# Patient Record
Sex: FEMALE | Race: WHITE | Employment: OTHER | URBAN - METROPOLITAN AREA
[De-identification: names, ages, dates, MRNs, and addresses within clinical notes are randomized per-mention and may not be internally consistent; named-entity substitution may affect disease eponyms.]

---

## 2017-06-13 ENCOUNTER — HOSPITAL ENCOUNTER (OUTPATIENT)
Dept: PHYSICAL THERAPY | Facility: HOSPITAL | Age: 67
Setting detail: THERAPIES SERIES
Discharge: HOME | End: 2017-06-13
Attending: ORTHOPAEDIC SURGERY
Payer: MEDICARE

## 2017-06-13 DIAGNOSIS — M70.62 TROCHANTERIC BURSITIS OF BOTH HIPS: ICD-10-CM

## 2017-06-13 DIAGNOSIS — M70.61 TROCHANTERIC BURSITIS OF BOTH HIPS: ICD-10-CM

## 2017-06-13 DIAGNOSIS — M47.27 LUMBOSACRAL RADICULOPATHY DUE TO DEGENERATIVE JOINT DISEASE OF SPINE: Primary | ICD-10-CM

## 2017-06-13 PROCEDURE — 97140 MANUAL THERAPY 1/> REGIONS: CPT

## 2017-06-13 PROCEDURE — 97110 THERAPEUTIC EXERCISES: CPT

## 2017-06-13 PROCEDURE — 97162 PT EVAL MOD COMPLEX 30 MIN: CPT

## 2017-06-13 NOTE — PROGRESS NOTES
SPINE EVALUATION:   Referring Physician: Dr. Parish Matamoros  Diagnosis: Lumbosacral radiculopathy due to degenerative disease of spine (722.52; M47.27)  Trochanteric bursitis of both hips (726.5, M70.61)     Date of Service: 6/13/2017     PATIENT Antonio Tenorio and rotation ROM, segmental hypomobility of lumbar and thoracic spine, flexibility deficits most pronounced in hip flexors, ITB, and gluteals, postural deficits, and gluteal muscle strength deficits.   Hercules Calender would benefit from skilled Physical Therapy to a (B)  Single leg bridge: L hip drop with R stance leg      Balance: SLS R 20 sec, less steady, drops opposite hip, L 30 sec, somewhat unsteady    Today’s Treatment and Response: central and unilateral L4/5 and L5/S1 PA mobilization, Gr III; rotational gappi x/week or a total of 10 visits over a 90 day period. Treatment will include: Manual Therapy; Therapeutic Exercises; Neuromuscular Re-education; Therapeutic Activity;  Electrical Stim; Mechanical Traction; Pt education; Home exercise program instruction    E

## 2017-06-15 ENCOUNTER — HOSPITAL ENCOUNTER (OUTPATIENT)
Dept: PHYSICAL THERAPY | Facility: HOSPITAL | Age: 67
Setting detail: THERAPIES SERIES
Discharge: HOME | End: 2017-06-15
Attending: ORTHOPAEDIC SURGERY
Payer: MEDICARE

## 2017-06-15 PROCEDURE — 97140 MANUAL THERAPY 1/> REGIONS: CPT

## 2017-06-15 PROCEDURE — 97110 THERAPEUTIC EXERCISES: CPT

## 2017-06-15 NOTE — PROGRESS NOTES
Dx: Lumbosacral radiculopathy due to degenerative disease of spine (722.52; M47.27)  Trochanteric bursitis of both hips (726.5, M70.61)             Authorized # of Visits:  na         Next MD visit: none scheduled  Fall Risk: standard         Precautions: Date:   Tx#: 6/ Date: Tx#: 7/ Date:    Tx#: 8/   NU step L5 4'         Prone CPA mobs Gr III L3/4, L4/5 and L5/S1 and L/R PA mobs Gr III L5/S1         Prone press-ups x 10         (B) quad stretch in prone         Foam rolling (B) ITB, lateral retinaculum

## 2017-06-19 ENCOUNTER — HOSPITAL ENCOUNTER (OUTPATIENT)
Dept: PHYSICAL THERAPY | Facility: HOSPITAL | Age: 67
Setting detail: THERAPIES SERIES
Discharge: HOME | End: 2017-06-19
Attending: ORTHOPAEDIC SURGERY
Payer: MEDICARE

## 2017-06-19 PROCEDURE — 97110 THERAPEUTIC EXERCISES: CPT

## 2017-06-19 PROCEDURE — 97140 MANUAL THERAPY 1/> REGIONS: CPT

## 2017-06-19 NOTE — PROGRESS NOTES
Dx: Lumbosacral radiculopathy due to degenerative disease of spine (722.52; M47.27)  Trochanteric bursitis of both hips (726.5, M70.61)             Authorized # of Visits:  na         Next MD visit: none scheduled  Fall Risk: standard         Precautions: handaliviail (10 visits)  · Pt will be independent and compliant with comprehensive HEP to maintain progress achieved in PT (10 visits)    Plan: lumbar mobilization, hip mobilization, stretching    Date: 6/15/2017  Tx#: 2/10 Date: 6/19/17    Tx#: 3/10 Date:

## 2017-06-21 ENCOUNTER — APPOINTMENT (OUTPATIENT)
Dept: PHYSICAL THERAPY | Facility: HOSPITAL | Age: 67
End: 2017-06-21
Attending: ORTHOPAEDIC SURGERY
Payer: MEDICARE

## 2017-06-22 ENCOUNTER — APPOINTMENT (OUTPATIENT)
Dept: PHYSICAL THERAPY | Facility: HOSPITAL | Age: 67
End: 2017-06-22
Attending: ORTHOPAEDIC SURGERY
Payer: MEDICARE

## 2017-06-26 ENCOUNTER — HOSPITAL ENCOUNTER (OUTPATIENT)
Dept: PHYSICAL THERAPY | Facility: HOSPITAL | Age: 67
Setting detail: THERAPIES SERIES
Discharge: HOME | End: 2017-06-26
Attending: ORTHOPAEDIC SURGERY
Payer: MEDICARE

## 2017-06-26 PROCEDURE — 97110 THERAPEUTIC EXERCISES: CPT

## 2017-06-26 PROCEDURE — 97140 MANUAL THERAPY 1/> REGIONS: CPT

## 2017-06-26 NOTE — PROGRESS NOTES
Dx: Lumbosacral radiculopathy due to degenerative disease of spine (722.52; M47.27)  Trochanteric bursitis of both hips (726.5, M70.61)             Authorized # of Visits:  na         Next MD visit: none scheduled  Fall Risk: standard         Precautions: demonstrate improved flexibility in lower quadrant to be able to get up and initiate walking without pain (10 visits)  · Pt will improve hip ABD and ER strength to 5-/5 to increase ease with standing and walking (10 visits)  · Pt will demonstrate decreased Skilled Services: pt education, manual therapy    Charges: man 1 10', ex 2 30'       Total Timed Treatment: 40 min  Total Treatment Time: 40 min

## 2017-07-06 ENCOUNTER — HOSPITAL ENCOUNTER (OUTPATIENT)
Dept: PHYSICAL THERAPY | Facility: HOSPITAL | Age: 67
Setting detail: THERAPIES SERIES
Discharge: HOME | End: 2017-07-06
Attending: ORTHOPAEDIC SURGERY
Payer: MEDICARE

## 2017-07-06 PROCEDURE — 97140 MANUAL THERAPY 1/> REGIONS: CPT

## 2017-07-06 PROCEDURE — 97110 THERAPEUTIC EXERCISES: CPT

## 2017-07-06 NOTE — PROGRESS NOTES
Dx: Lumbosacral radiculopathy due to degenerative disease of spine (722.52; M47.27)  Trochanteric bursitis of both hips (726.5, M70.61)             Authorized # of Visits:  na         Next MD visit: none scheduled  Fall Risk: standard         Precautions: mobilization, hip mobilization, stretching    Date: 6/15/2017  Tx#: 2/10 Date: 6/19/17    Tx#: 3/10 Date: 6/26/17   Tx#: 4/10       Date: 7/6/17  Tx#: 5/10 Date: Tx#: 6/ Date: Tx#: 7/ Date:    Tx#: 8/   NU step L5 4' 5' 4' 4'      Prone CPA mobs Gr III

## 2017-07-10 ENCOUNTER — HOSPITAL ENCOUNTER (OUTPATIENT)
Dept: PHYSICAL THERAPY | Facility: HOSPITAL | Age: 67
Setting detail: THERAPIES SERIES
Discharge: HOME | End: 2017-07-10
Attending: ORTHOPAEDIC SURGERY
Payer: MEDICARE

## 2017-07-10 PROCEDURE — 97110 THERAPEUTIC EXERCISES: CPT

## 2017-07-10 PROCEDURE — 97140 MANUAL THERAPY 1/> REGIONS: CPT

## 2017-07-10 NOTE — PROGRESS NOTES
Dx: Lumbosacral radiculopathy due to degenerative disease of spine (722.52; M47.27)  Trochanteric bursitis of both hips (726.5, M70.61)             Authorized # of Visits:  na         Next MD visit: none scheduled  Fall Risk: standard         Precautions: Tx#: 3/10 Date: 6/26/17   Tx#: 4/10       Date: 7/6/17  Tx#: 5/10 Date: 7/10/17  Tx#: 6/10 Date: Tx#: 7/ Date:    Tx#: 8/ NU step L5 4' 5' 4' 4' --     Prone CPA mobs Gr III L3/4, L4/5 and L5/S1 and L/R PA mobs Gr III L5/S1 Lateral shift correction, h 40 min  Total Treatment Time: 40 min

## 2017-07-12 ENCOUNTER — APPOINTMENT (OUTPATIENT)
Dept: PHYSICAL THERAPY | Facility: HOSPITAL | Age: 67
End: 2017-07-12
Attending: ORTHOPAEDIC SURGERY
Payer: MEDICARE

## 2017-07-15 ENCOUNTER — OFFICE VISIT (OUTPATIENT)
Dept: FAMILY MEDICINE CLINIC | Facility: CLINIC | Age: 67
End: 2017-07-15

## 2017-07-15 DIAGNOSIS — Z02.9 ADMINISTRATIVE ENCOUNTER: ICD-10-CM

## 2017-07-15 DIAGNOSIS — R51.9 ACUTE NONINTRACTABLE HEADACHE, UNSPECIFIED HEADACHE TYPE: Primary | ICD-10-CM

## 2017-07-15 NOTE — PROGRESS NOTES
79 y/o female presented with a severe headache x 3 days that is unusual for her. Denies any dizziness, visual changes, weakness. States she just wants to lay down. /70.  Given pt's age and new onset severe headache, she was advised to go to the [address] [place] [address]

## 2017-07-17 ENCOUNTER — APPOINTMENT (OUTPATIENT)
Dept: PHYSICAL THERAPY | Facility: HOSPITAL | Age: 67
End: 2017-07-17
Attending: ORTHOPAEDIC SURGERY
Payer: MEDICARE

## 2017-07-20 ENCOUNTER — LAB ENCOUNTER (OUTPATIENT)
Dept: LAB | Age: 67
End: 2017-07-20
Attending: FAMILY MEDICINE
Payer: MEDICARE

## 2017-07-20 ENCOUNTER — OFFICE VISIT (OUTPATIENT)
Dept: FAMILY MEDICINE CLINIC | Facility: CLINIC | Age: 67
End: 2017-07-20

## 2017-07-20 VITALS
HEART RATE: 77 BPM | DIASTOLIC BLOOD PRESSURE: 60 MMHG | RESPIRATION RATE: 20 BRPM | SYSTOLIC BLOOD PRESSURE: 102 MMHG | HEIGHT: 66.5 IN | WEIGHT: 180.5 LBS | TEMPERATURE: 99 F | BODY MASS INDEX: 28.66 KG/M2 | OXYGEN SATURATION: 95 %

## 2017-07-20 DIAGNOSIS — R53.83 FATIGUE, UNSPECIFIED TYPE: ICD-10-CM

## 2017-07-20 DIAGNOSIS — R94.5 ABNORMAL RESULTS OF LIVER FUNCTION STUDIES: ICD-10-CM

## 2017-07-20 DIAGNOSIS — K52.1 DIARRHEA DUE TO DRUG: Primary | ICD-10-CM

## 2017-07-20 DIAGNOSIS — K52.1 DIARRHEA DUE TO DRUG: ICD-10-CM

## 2017-07-20 DIAGNOSIS — R50.81 FEVER IN OTHER DISEASES: ICD-10-CM

## 2017-07-20 DIAGNOSIS — R50.9 FEVER, UNSPECIFIED FEVER CAUSE: ICD-10-CM

## 2017-07-20 DIAGNOSIS — R19.7 DIARRHEA OF PRESUMED INFECTIOUS ORIGIN: ICD-10-CM

## 2017-07-20 DIAGNOSIS — D72.819 LEUKOPENIA, UNSPECIFIED TYPE: ICD-10-CM

## 2017-07-20 DIAGNOSIS — R74.8 LIVER ENZYME ELEVATION: Primary | ICD-10-CM

## 2017-07-20 LAB
ALBUMIN SERPL-MCNC: 3.5 G/DL (ref 3.5–4.8)
ALP LIVER SERPL-CCNC: 75 U/L (ref 55–142)
ALT SERPL-CCNC: 82 U/L (ref 14–54)
AST SERPL-CCNC: 45 U/L (ref 15–41)
BASOPHILS # BLD AUTO: 0.02 X10(3) UL (ref 0–0.1)
BASOPHILS NFR BLD AUTO: 0.5 %
BILIRUB SERPL-MCNC: 0.2 MG/DL (ref 0.1–2)
BUN BLD-MCNC: 6 MG/DL (ref 8–20)
CALCIUM BLD-MCNC: 9.3 MG/DL (ref 8.3–10.3)
CHLORIDE: 102 MMOL/L (ref 101–111)
CO2: 25 MMOL/L (ref 22–32)
CREAT BLD-MCNC: 0.81 MG/DL (ref 0.55–1.02)
EOSINOPHIL # BLD AUTO: 0.04 X10(3) UL (ref 0–0.3)
EOSINOPHIL NFR BLD AUTO: 1.1 %
ERYTHROCYTE [DISTWIDTH] IN BLOOD BY AUTOMATED COUNT: 13.4 % (ref 11.5–16)
GLUCOSE BLD-MCNC: 92 MG/DL (ref 70–99)
HCT VFR BLD AUTO: 42.3 % (ref 34–50)
HGB BLD-MCNC: 13.9 G/DL (ref 12–16)
IMMATURE GRANULOCYTE COUNT: 0.02 X10(3) UL (ref 0–1)
IMMATURE GRANULOCYTE RATIO %: 0.5 %
LYMPHOCYTES # BLD AUTO: 1.88 X10(3) UL (ref 0.9–4)
LYMPHOCYTES NFR BLD AUTO: 51.6 %
M PROTEIN MFR SERPL ELPH: 7.9 G/DL (ref 6.1–8.3)
MCH RBC QN AUTO: 27.7 PG (ref 27–33.2)
MCHC RBC AUTO-ENTMCNC: 32.9 G/DL (ref 31–37)
MCV RBC AUTO: 84.3 FL (ref 81–100)
MONOCYTES # BLD AUTO: 0.32 X10(3) UL (ref 0.1–0.6)
MONOCYTES NFR BLD AUTO: 8.8 %
NEUTROPHIL ABS PRELIM: 1.36 X10 (3) UL (ref 1.3–6.7)
NEUTROPHILS # BLD AUTO: 1.36 X10(3) UL (ref 1.3–6.7)
NEUTROPHILS NFR BLD AUTO: 37.5 %
PLATELET # BLD AUTO: 249 10(3)UL (ref 150–450)
POTASSIUM SERPL-SCNC: 3.6 MMOL/L (ref 3.6–5.1)
RBC # BLD AUTO: 5.02 X10(6)UL (ref 3.8–5.1)
RED CELL DISTRIBUTION WIDTH-SD: 41.4 FL (ref 35.1–46.3)
SODIUM SERPL-SCNC: 137 MMOL/L (ref 136–144)
WBC # BLD AUTO: 3.6 X10(3) UL (ref 4–13)

## 2017-07-20 PROCEDURE — 99213 OFFICE O/P EST LOW 20 MIN: CPT | Performed by: FAMILY MEDICINE

## 2017-07-20 PROCEDURE — 36415 COLL VENOUS BLD VENIPUNCTURE: CPT

## 2017-07-20 PROCEDURE — 82248 BILIRUBIN DIRECT: CPT

## 2017-07-20 PROCEDURE — 85025 COMPLETE CBC W/AUTO DIFF WBC: CPT

## 2017-07-20 PROCEDURE — 80053 COMPREHEN METABOLIC PANEL: CPT

## 2017-07-20 RX ORDER — AMOXICILLIN AND CLAVULANATE POTASSIUM 875; 125 MG/1; MG/1
TABLET, FILM COATED ORAL
COMMUNITY
Start: 2017-07-16 | End: 2018-10-22 | Stop reason: ALTCHOICE

## 2017-07-20 NOTE — PATIENT INSTRUCTIONS
CBC (blood count) and CMP (electrolytes, kidney & liver function) to check on reasons for fatigue & probable viral infection. Stop Augmentin, if diarrhea continues Saturday, let me know & we can do testing for C dif, a toxin-producing bacteria.     Start

## 2017-07-21 ENCOUNTER — TELEPHONE (OUTPATIENT)
Dept: FAMILY MEDICINE CLINIC | Facility: CLINIC | Age: 67
End: 2017-07-21

## 2017-07-21 ENCOUNTER — LAB ENCOUNTER (OUTPATIENT)
Dept: LAB | Age: 67
End: 2017-07-21
Attending: FAMILY MEDICINE
Payer: MEDICARE

## 2017-07-21 DIAGNOSIS — R51.9 ACUTE NONINTRACTABLE HEADACHE, UNSPECIFIED HEADACHE TYPE: ICD-10-CM

## 2017-07-21 DIAGNOSIS — R79.89 LFT ELEVATION: ICD-10-CM

## 2017-07-21 DIAGNOSIS — B34.9 ACUTE VIRAL SYNDROME: ICD-10-CM

## 2017-07-21 DIAGNOSIS — R94.5 ABNORMAL RESULTS OF LIVER FUNCTION STUDIES: ICD-10-CM

## 2017-07-21 DIAGNOSIS — R50.9 FEVER, UNSPECIFIED FEVER CAUSE: ICD-10-CM

## 2017-07-21 DIAGNOSIS — R74.8 LIVER ENZYME ELEVATION: ICD-10-CM
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
## 2017-07-21 DIAGNOSIS — R19.7 DIARRHEA, UNSPECIFIED TYPE: ICD-10-CM

## 2017-07-21 DIAGNOSIS — D72.819 LEUKOPENIA, UNSPECIFIED TYPE: ICD-10-CM

## 2017-07-21 DIAGNOSIS — R19.7 DIARRHEA OF PRESUMED INFECTIOUS ORIGIN: ICD-10-CM

## 2017-07-21 LAB
BILIRUB DIRECT SERPL-MCNC: <0.1 MG/DL (ref 0.1–0.5)
HAV IGM SER QL: NONREACTIVE
HBV CORE IGM SER QL: NONREACTIVE
HBV SURFACE AG SERPL QL IA: NONREACTIVE
HEPATITIS C VIRUS AB INTERPRETATION: NONREACTIVE

## 2017-07-21 PROCEDURE — 86788 WEST NILE VIRUS AB IGM: CPT

## 2017-07-21 PROCEDURE — 86789 WEST NILE VIRUS ANTIBODY: CPT

## 2017-07-21 PROCEDURE — 80074 ACUTE HEPATITIS PANEL: CPT

## 2017-07-21 PROCEDURE — 36415 COLL VENOUS BLD VENIPUNCTURE: CPT

## 2017-07-21 NOTE — PROGRESS NOTES
HPI:    Patient ID: Eber Watson is a 78year old female.     HPI    Review of Systems         Current Outpatient Prescriptions:  Amoxicillin-Pot Clavulanate 875-125 MG Oral Tab  Disp:  Rfl:    Ascorbic Acid (VITAMIN C) 100 MG Oral Tab Take 100 mg by

## 2017-07-21 NOTE — PROGRESS NOTES
Please call tomorrow to see if hepatitis panel can be added on, and to notify pt of test results. She might need to go back to have hepatitis panel ordered.

## 2017-07-21 NOTE — TELEPHONE ENCOUNTER
Called [facility] in the morning to add on Hepatitis panel. Cant order add on don't have correct tube. Patient needs to do     test.  Patient informed she will have done soon. Patient mentioned bug bite that is small and red.  She thinks     maybe W

## 2017-07-22 ENCOUNTER — PATIENT MESSAGE (OUTPATIENT)
Dept: FAMILY MEDICINE CLINIC | Facility: CLINIC | Age: 67
End: 2017-07-22

## 2017-07-24 ENCOUNTER — TELEPHONE (OUTPATIENT)
Dept: FAMILY MEDICINE CLINIC | Facility: CLINIC | Age: 67
End: 2017-07-24

## 2017-07-24 ENCOUNTER — OFFICE VISIT (OUTPATIENT)
Dept: FAMILY MEDICINE CLINIC | Facility: CLINIC | Age: 67
End: 2017-07-24

## 2017-07-24 VITALS
SYSTOLIC BLOOD PRESSURE: 102 MMHG | BODY MASS INDEX: 29.01 KG/M2 | HEIGHT: 66.25 IN | RESPIRATION RATE: 16 BRPM | HEART RATE: 68 BPM | DIASTOLIC BLOOD PRESSURE: 60 MMHG | TEMPERATURE: 99 F | WEIGHT: 180.5 LBS

## 2017-07-24 DIAGNOSIS — R79.89 LFT ELEVATION: ICD-10-CM

## 2017-07-24 DIAGNOSIS — R51.9 ACUTE NONINTRACTABLE HEADACHE, UNSPECIFIED HEADACHE TYPE: ICD-10-CM
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
## 2017-07-24 DIAGNOSIS — R19.7 DIARRHEA, UNSPECIFIED TYPE: ICD-10-CM

## 2017-07-24 DIAGNOSIS — B34.9 ACUTE VIRAL SYNDROME: Primary | ICD-10-CM

## 2017-07-24 DIAGNOSIS — D72.819 LEUKOPENIA, UNSPECIFIED TYPE: ICD-10-CM

## 2017-07-24 PROCEDURE — 99213 OFFICE O/P EST LOW 20 MIN: CPT | Performed by: FAMILY MEDICINE

## 2017-07-24 NOTE — TELEPHONE ENCOUNTER
Called pt. She said she has looked just 5 minutes before calling us and had not seen lissa's return E-mail. Did schedule pt APPT.     Future Appointments  Date Time Provider Jaylon Knutson   7/24/2017 11:30 AM Norm Dupont MD [facility] [address]

## 2017-07-24 NOTE — PROGRESS NOTES
Yeny Wagner IS A 78year old female HERE FOR Patient presents with: Follow - Up: GI sx have not change,cough has resolved. . room 4       History of present illness:     Still has some sinus congestion, less cough.  Increased urination with increase (VITAMIN C) 100 MG Oral Tab Take 100 mg by mouth daily. Disp:  Rfl:    Zinc Sulfate (ZINC 15 OR) Take by mouth. Disp:  Rfl:    Cyanocobalamin (VITAMIN B12 OR) Take 1 tablet by mouth daily. Disp:  Rfl:    Cranberry 1000 MG Oral Cap Take  by mouth.  Disp:  Rf Main Topics   Smoking status: Never Smoker    Smokeless tobacco: Never Used    Alcohol use Yes  0.0 oz/week     Comment: occ    Drug use: No    Sexual activity: Yes     Other Topics Concern    Blood Transfusions No    Caffeine Concern Yes    Comment: 2 cup with back pain and other aches, avoid ibuprofen for about 3-4 days due to continued diarrhea. Imodium you can try first thing in morning, 1 tablet (2 mg) to prevent diarrhea next 2 days.     Gradual diet advance (soft peeled fruits, cooked vegetables and

## 2017-07-24 NOTE — TELEPHONE ENCOUNTER
Diarrhea. Just in the morning. Had additonal labs done last week/Friday. Did send [employer] message to Dr Christianne Ferrer Saturday as she was told to message Dr Christianne Ferrer if she was still experiencing diarrhea.     Concerned she had not heard back from Dr Christianne Ferrer yet

## 2017-07-24 NOTE — PATIENT INSTRUCTIONS
Tylenol is ok with back pain and other aches, avoid ibuprofen for about 3-4 days due to continued diarrhea. Imodium you can try first thing in morning, 1 tablet (2 mg) to prevent diarrhea next 2 days.     Gradual diet advance (soft peeled fruits, cooked

## 2017-07-24 NOTE — TELEPHONE ENCOUNTER
Message received on Saturday. Message back to pt today to see how she is feeling    Several openings, do you wish to see pt today?

## 2017-07-24 NOTE — TELEPHONE ENCOUNTER
From: Marjorie Urias  To: Courtney Liang MD  Sent: 7/22/2017 4:15 PM CDT  Subject: Other    you said to let you know if the diarrhea continued today Saturday and it has and I am still exhausted.   thank you,  Vernell Swan

## 2017-07-24 NOTE — TELEPHONE ENCOUNTER
Duplicate TE -- as one marked Patient E-Mail and one listed as Telephone Encounter. Pt stated she looked just a few mintues before calling us and had not yet received E-mail message from Jose [place]. Appt scheduled or today.     Future Appointments  Date Ti

## 2017-07-26 ENCOUNTER — TELEPHONE (OUTPATIENT)
Dept: FAMILY MEDICINE CLINIC | Facility: CLINIC | Age: 67
End: 2017-07-26

## 2017-07-26 DIAGNOSIS — K52.1 ANTIBIOTIC-ASSOCIATED DIARRHEA: Primary | ICD-10-CM

## 2017-07-26 DIAGNOSIS — T36.95XA ANTIBIOTIC-ASSOCIATED DIARRHEA: Primary | ICD-10-CM

## 2017-07-26 LAB
WEST NILE VIRUS AB, IGG, SER: 0.05 IV
WEST NILE VIRUS AB, IGM, SER: 0.05 IV

## 2017-07-28 ENCOUNTER — TELEPHONE (OUTPATIENT)
Dept: FAMILY MEDICINE CLINIC | Facility: CLINIC | Age: 67
End: 2017-07-28

## 2017-07-28 NOTE — TELEPHONE ENCOUNTER
No new medicine. Patient says she has red areas under both arms that are larger     than  dimes. Under both arms red and  raised. Not pus filled. But hurts  if     pressed. Call back on Monday prn. No new detergent or     underarm shaving.  Patient is LILY

## 2017-07-28 NOTE — TELEPHONE ENCOUNTER
Patient called and is upset due to she is getting pumps under her arms and she does not know what they are.     She would like to speak with a nurse asap

## 2017-08-21 ENCOUNTER — PATIENT MESSAGE (OUTPATIENT)
Dept: FAMILY MEDICINE CLINIC | Facility: CLINIC | Age: 67
End: 2017-08-21

## 2017-08-22 NOTE — TELEPHONE ENCOUNTER
From: Phu Mims  To: Brea Fried MD  Sent: 8/21/2017 3:19 PM CDT  Subject: Visit Follow-up Question    I believe I am suppose to have some follow up blood work.   I am not sure when but I thought by month end.  thanks   [facility]

## 2017-08-24 ENCOUNTER — LAB ENCOUNTER (OUTPATIENT)
Dept: LAB | Age: 67
End: 2017-08-24
Attending: FAMILY MEDICINE
Payer: MEDICARE

## 2017-08-24 DIAGNOSIS — R50.9 FEVER, UNSPECIFIED FEVER CAUSE: ICD-10-CM

## 2017-08-24 DIAGNOSIS — D72.819 LEUKOPENIA, UNSPECIFIED TYPE: ICD-10-CM

## 2017-08-24 DIAGNOSIS — R19.7 DIARRHEA OF PRESUMED INFECTIOUS ORIGIN: ICD-10-CM

## 2017-08-24 DIAGNOSIS — R74.8 LIVER ENZYME ELEVATION: ICD-10-CM

## 2017-08-24 LAB
BASOPHILS # BLD AUTO: 0.04 X10(3) UL (ref 0–0.1)
BASOPHILS NFR BLD AUTO: 0.5 %
EOSINOPHIL # BLD AUTO: 0.12 X10(3) UL (ref 0–0.3)
EOSINOPHIL NFR BLD AUTO: 1.5 %
ERYTHROCYTE [DISTWIDTH] IN BLOOD BY AUTOMATED COUNT: 15.4 % (ref 11.5–16)
HCT VFR BLD AUTO: 41.8 % (ref 34–50)
HGB BLD-MCNC: 13.3 G/DL (ref 12–16)
IMMATURE GRANULOCYTE COUNT: 0.02 X10(3) UL (ref 0–1)
IMMATURE GRANULOCYTE RATIO %: 0.3 %
LYMPHOCYTES # BLD AUTO: 2.28 X10(3) UL (ref 0.9–4)
LYMPHOCYTES NFR BLD AUTO: 28.8 %
MCH RBC QN AUTO: 28.9 PG (ref 27–33.2)
MCHC RBC AUTO-ENTMCNC: 31.8 G/DL (ref 31–37)
MCV RBC AUTO: 90.7 FL (ref 81–100)
MONOCYTES # BLD AUTO: 0.68 X10(3) UL (ref 0.1–0.6)
MONOCYTES NFR BLD AUTO: 8.6 %
NEUTROPHIL ABS PRELIM: 4.78 X10 (3) UL (ref 1.3–6.7)
NEUTROPHILS # BLD AUTO: 4.78 X10(3) UL (ref 1.3–6.7)
NEUTROPHILS NFR BLD AUTO: 60.3 %
PLATELET # BLD AUTO: 251 10(3)UL (ref 150–450)
RBC # BLD AUTO: 4.61 X10(6)UL (ref 3.8–5.1)
RED CELL DISTRIBUTION WIDTH-SD: 50.9 FL (ref 35.1–46.3)
WBC # BLD AUTO: 7.9 X10(3) UL (ref 4–13)

## 2017-08-24 PROCEDURE — 85025 COMPLETE CBC W/AUTO DIFF WBC: CPT

## 2017-08-24 PROCEDURE — 36415 COLL VENOUS BLD VENIPUNCTURE: CPT

## 2017-10-16 ENCOUNTER — HOSPITAL ENCOUNTER (OUTPATIENT)
Dept: MRI IMAGING | Age: 67
Discharge: HOME | End: 2017-10-16
Attending: ORTHOPAEDIC SURGERY
Payer: MEDICARE

## 2017-10-16 DIAGNOSIS — M76.899 ENTHESOPATHY OF HIP REGION: ICD-10-CM

## 2017-10-16 PROCEDURE — 73721 MRI JNT OF LWR EXTRE W/O DYE: CPT | Performed by: ORTHOPAEDIC SURGERY

## 2018-03-23 ENCOUNTER — TELEPHONE (OUTPATIENT)
Dept: FAMILY MEDICINE CLINIC | Facility: CLINIC | Age: 68
End: 2018-03-23

## 2018-03-23 NOTE — TELEPHONE ENCOUNTER
[facility]  Sending in green bag to [employer] [facility] 3/23/2018

## 2018-03-27 ENCOUNTER — TELEPHONE (OUTPATIENT)
Dept: FAMILY MEDICINE CLINIC | Facility: CLINIC | Age: 68
End: 2018-03-27

## 2018-03-27 NOTE — TELEPHONE ENCOUNTER
Received request for Medical Records from [facility].  Sending to MR in green bag 3/27/2018 unknown

## 2018-10-22 ENCOUNTER — OFFICE VISIT (OUTPATIENT)
Dept: FAMILY MEDICINE CLINIC | Facility: CLINIC | Age: 68
End: 2018-10-22
Payer: MEDICARE

## 2018-10-22 VITALS
HEART RATE: 65 BPM | DIASTOLIC BLOOD PRESSURE: 72 MMHG | HEIGHT: 67 IN | WEIGHT: 184.63 LBS | OXYGEN SATURATION: 98 % | SYSTOLIC BLOOD PRESSURE: 114 MMHG | BODY MASS INDEX: 28.98 KG/M2 | TEMPERATURE: 98 F | RESPIRATION RATE: 16 BRPM

## 2018-10-22 DIAGNOSIS — Z51.81 ENCOUNTER FOR MONITORING CHRONIC NSAID THERAPY: ICD-10-CM

## 2018-10-22 DIAGNOSIS — M67.952 TENDINOPATHY OF LEFT GLUTEUS MEDIUS: Primary | ICD-10-CM

## 2018-10-22 DIAGNOSIS — Z12.31 ENCOUNTER FOR SCREENING MAMMOGRAM FOR BREAST CANCER: ICD-10-CM

## 2018-10-22 DIAGNOSIS — Z79.1 ENCOUNTER FOR MONITORING CHRONIC NSAID THERAPY: ICD-10-CM

## 2018-10-22 PROCEDURE — 99213 OFFICE O/P EST LOW 20 MIN: CPT | Performed by: FAMILY MEDICINE

## 2018-10-22 NOTE — PATIENT INSTRUCTIONS
Sees Dr. Garcia with [facility] for advice on what to do to rehab your gluteus medius tear/strain. Continue Aleve 2 pills twice daily     Get lab work for kidney & liver function due to the aleve use.     Probably need some physical therapy bu

## 2018-10-22 NOTE — PROGRESS NOTES
Darwin Hunt IS A 70year old female HERE FOR Patient presents with:  Pain: LT gluteus        History of present illness:     Saw Dr Deneen Walker a year ago for acute pain, went through therapy, does exercises at home.  Had partial tear/fray at inserton of g glasses        No past surgical history on file. Current medications:       Current Outpatient Medications:  Naproxen Sodium (ALEVE OR) Take 2 tablets by mouth as needed.  Disp:  Rfl:    Ascorbic Acid (VITAMIN C) 100 MG Oral Tab Take 100 mg by mouth ramy thyroid/endocrine   • Cancer Brother        Social history:       Social History    Socioeconomic History      Marital status:       Spouse name: Not on file      Number of children: 2      Years of education: Not on file      Highest education leve on right. SLR negative  DTRs normal bilateral, EHL negative     Test results: MRI 10/16/17 Partial tearing/fraying at the insertional fibers of the gluteus medius on the greater trochanter. Assessment & Plan:   Terry Kellogg was seen today for pain.     Diag

## 2018-10-24 ENCOUNTER — HOSPITAL ENCOUNTER (OUTPATIENT)
Dept: MAMMOGRAPHY | Age: 68
Discharge: HOME | End: 2018-10-24
Attending: FAMILY MEDICINE
Payer: MEDICARE

## 2018-10-24 DIAGNOSIS — Z12.31 ENCOUNTER FOR SCREENING MAMMOGRAM FOR BREAST CANCER: ICD-10-CM

## 2018-10-24 PROCEDURE — 77063 BREAST TOMOSYNTHESIS BI: CPT | Performed by: FAMILY MEDICINE

## 2018-10-24 PROCEDURE — 77067 SCR MAMMO BI INCL CAD: CPT | Performed by: FAMILY MEDICINE

## 2018-10-25 ENCOUNTER — MED REC SCAN ONLY (OUTPATIENT)
Dept: FAMILY MEDICINE CLINIC | Facility: CLINIC | Age: 68
End: 2018-10-25

## 2018-11-02 ENCOUNTER — MED REC SCAN ONLY (OUTPATIENT)
Dept: FAMILY MEDICINE CLINIC | Facility: CLINIC | Age: 68
End: 2018-11-02

## 2018-11-19 ENCOUNTER — APPOINTMENT (OUTPATIENT)
Dept: LAB | Age: 68
End: 2018-11-19
Attending: FAMILY MEDICINE
Payer: MEDICARE

## 2018-11-19 DIAGNOSIS — Z51.81 ENCOUNTER FOR MONITORING CHRONIC NSAID THERAPY: ICD-10-CM

## 2018-11-19 DIAGNOSIS — Z79.1 ENCOUNTER FOR MONITORING CHRONIC NSAID THERAPY: ICD-10-CM

## 2018-11-19 PROCEDURE — 80053 COMPREHEN METABOLIC PANEL: CPT

## 2018-11-19 PROCEDURE — 36415 COLL VENOUS BLD VENIPUNCTURE: CPT

## 2019-03-27 ENCOUNTER — OFFICE VISIT (OUTPATIENT)
Dept: FAMILY MEDICINE CLINIC | Facility: CLINIC | Age: 69
End: 2019-03-27
Payer: MEDICARE

## 2019-03-27 VITALS
TEMPERATURE: 99 F | WEIGHT: 179 LBS | SYSTOLIC BLOOD PRESSURE: 106 MMHG | BODY MASS INDEX: 28.09 KG/M2 | HEART RATE: 76 BPM | RESPIRATION RATE: 16 BRPM | DIASTOLIC BLOOD PRESSURE: 70 MMHG | OXYGEN SATURATION: 98 % | HEIGHT: 66.75 IN

## 2019-03-27 DIAGNOSIS — Z00.00 ENCOUNTER FOR ANNUAL HEALTH EXAMINATION: Primary | ICD-10-CM

## 2019-03-27 DIAGNOSIS — Z13.6 SCREENING FOR CARDIOVASCULAR CONDITION: ICD-10-CM

## 2019-03-27 DIAGNOSIS — E04.9 GOITER: ICD-10-CM

## 2019-03-27 DIAGNOSIS — Z80.52 FAMILY HISTORY OF MALIGNANT NEOPLASM OF URINARY BLADDER: ICD-10-CM

## 2019-03-27 DIAGNOSIS — Z78.0 POSTMENOPAUSAL: ICD-10-CM

## 2019-03-27 DIAGNOSIS — E78.00 HYPERCHOLESTEROLEMIA: ICD-10-CM

## 2019-03-27 LAB
APPEARANCE: CLEAR
BILIRUBIN: NEGATIVE
GLUCOSE (URINE DIPSTICK): NEGATIVE MG/DL
KETONES (URINE DIPSTICK): NEGATIVE MG/DL
LEUKOCYTES: NEGATIVE
MULTISTIX LOT#: NORMAL NUMERIC
NITRITE, URINE: NEGATIVE
OCCULT BLOOD: NEGATIVE
PH, URINE: 7 (ref 4.5–8)
PROTEIN (URINE DIPSTICK): NEGATIVE MG/DL
SPECIFIC GRAVITY: 1.01 (ref 1–1.03)
URINE-COLOR: YELLOW
UROBILINOGEN,SEMI-QN: 0.2 MG/DL (ref 0–1.9)

## 2019-03-27 PROCEDURE — 81003 URINALYSIS AUTO W/O SCOPE: CPT | Performed by: FAMILY MEDICINE

## 2019-03-27 PROCEDURE — G0439 PPPS, SUBSEQ VISIT: HCPCS | Performed by: FAMILY MEDICINE

## 2019-03-27 NOTE — PROGRESS NOTES
HPI:   Bennyjt Farfan is a 70year old female who presents for a Medicare Subsequent Annual Wellness visit (Pt already had Initial Annual Wellness). Was out in [place] to see sis in law.      Her last annual assessment has been over 1 year: Annual P unspecified site     Primary localized osteoarthrosis, hand     Lipoma of upper extremity (left hand palm) x years, advised to avoid removal if she can function which she could.       Plantar fasciitis--hurts with fashionable shoes      Palpitations--not a inoculation against influenza,  (normal spontaneous vaginal delivery) (, ), Osteopenia, Pain in joint, multiple sites, Polydipsia, Pure hypercholesterolemia, Routine general medical examination at a health care facility, Routine gynecological e 66. 75\". Weight as of this encounter: 179 lb. Medicare Hearing Assessment  (Required for AWV/SWV)    Questionnaire no problems       Visual Acuity --new glasses.                             TMs and throat normal  Neck no adenopathy, thyroid lumpy and mental well-being?: Visiting Friends;Puzzles;Games; Social Interaction; Visiting Family      This section provided for quick review of chart, separate sheet to patient  [address] Internal Lab or Procedure External Lab or applicable)     Influenza  Covered Annually 10/25/2018 Please get every year    Pneumococcal 13 (Prevnar)  Covered Once after 65 12/02/2016 Please get once after your 65th birthday    Pneumococcal 23 (Pneumovax)  Covered Once after 65 11/24/2014 Please get

## 2019-03-27 NOTE — PATIENT INSTRUCTIONS
[employer] SCREENING SCHEDULE   Tests on this list are recommended by your physician but may not be covered, or covered at this frequency, by your insurer. Please check with your insurance carrier before scheduling to verify coverage.    PREVEN following criteria:   • Men who are 73-68 years old and have smoked more than 100 cigarettes in their lifetime   • Anyone with a family history    Colorectal Cancer Screening  Covered up to Age 76     Colonoscopy Screen   Covered every 10 years- more often placed or performed in visit on 11/24/14   • FLU VACC PRSV FREE INC ANTIG    Please get every year    Pneumococcal 13 (Prevnar)  Covered Once after 65 Orders placed or performed in visit on 12/02/16   • PNEUMOCOCCAL VACC, 13 JOSE IM    Please get once after

## 2019-04-18 ENCOUNTER — TELEPHONE (OUTPATIENT)
Dept: FAMILY MEDICINE CLINIC | Facility: CLINIC | Age: 69
End: 2019-04-18

## 2019-04-18 NOTE — TELEPHONE ENCOUNTER
Patient LVM at 10:59 am stating she has questions regarding her labs. She wants to know about fasting and which labs were ordered. Forwarded VM to triage VM.

## 2019-04-22 ENCOUNTER — APPOINTMENT (OUTPATIENT)
Dept: LAB | Age: 69
End: 2019-04-22
Attending: FAMILY MEDICINE
Payer: MEDICARE

## 2019-04-22 ENCOUNTER — HOSPITAL ENCOUNTER (OUTPATIENT)
Dept: BONE DENSITY | Age: 69
Discharge: HOME | End: 2019-04-22
Attending: FAMILY MEDICINE
Payer: MEDICARE

## 2019-04-22 ENCOUNTER — HOSPITAL ENCOUNTER (OUTPATIENT)
Dept: ULTRASOUND IMAGING | Age: 69
Discharge: HOME | End: 2019-04-22
Attending: FAMILY MEDICINE
Payer: MEDICARE

## 2019-04-22 DIAGNOSIS — E78.00 HYPERCHOLESTEROLEMIA: ICD-10-CM

## 2019-04-22 DIAGNOSIS — E04.9 GOITER: ICD-10-CM

## 2019-04-22 DIAGNOSIS — Z13.6 SCREENING FOR CARDIOVASCULAR CONDITION: ICD-10-CM

## 2019-04-22 DIAGNOSIS — Z78.0 POSTMENOPAUSAL: ICD-10-CM

## 2019-04-22 PROCEDURE — 84443 ASSAY THYROID STIM HORMONE: CPT

## 2019-04-22 PROCEDURE — 76536 US EXAM OF HEAD AND NECK: CPT | Performed by: FAMILY MEDICINE

## 2019-04-22 PROCEDURE — 80053 COMPREHEN METABOLIC PANEL: CPT

## 2019-04-22 PROCEDURE — 36415 COLL VENOUS BLD VENIPUNCTURE: CPT

## 2019-04-22 PROCEDURE — 77080 DXA BONE DENSITY AXIAL: CPT | Performed by: FAMILY MEDICINE

## 2019-04-22 PROCEDURE — 80061 LIPID PANEL: CPT

## 2019-05-22 ENCOUNTER — TELEPHONE (OUTPATIENT)
Dept: FAMILY MEDICINE CLINIC | Facility: CLINIC | Age: 69
End: 2019-05-22

## 2019-05-22 NOTE — TELEPHONE ENCOUNTER
Medical Record Request Received    Date received in office: 5-22-19    Requested from: [facility]    Records to be sent to: [facility]   E-mail address :  [email or web address]            Date request sent to [employer]: 5-23-19

## 2019-05-23 ENCOUNTER — OFFICE VISIT (OUTPATIENT)
Dept: FAMILY MEDICINE CLINIC | Facility: CLINIC | Age: 69
End: 2019-05-23
Payer: MEDICARE

## 2019-05-23 VITALS
BODY MASS INDEX: 28.69 KG/M2 | DIASTOLIC BLOOD PRESSURE: 74 MMHG | RESPIRATION RATE: 16 BRPM | OXYGEN SATURATION: 98 % | HEART RATE: 83 BPM | SYSTOLIC BLOOD PRESSURE: 116 MMHG | WEIGHT: 182.81 LBS | HEIGHT: 66.75 IN | TEMPERATURE: 99 F

## 2019-05-23 DIAGNOSIS — L56.4: Primary | ICD-10-CM

## 2019-05-23 PROCEDURE — 99213 OFFICE O/P EST LOW 20 MIN: CPT | Performed by: FAMILY MEDICINE

## 2019-05-23 RX ORDER — PREDNISONE 20 MG/1
20 TABLET ORAL DAILY
Qty: 7 TABLET | Refills: 0 | Status: SHIPPED | OUTPATIENT
Start: 2019-05-23 | End: 2019-05-30

## 2019-05-23 NOTE — PROGRESS NOTES
Juanpablo Lawton IS A 70year old female HERE FOR Patient presents with:  Rash: Chest area        History of present illness:       Was in [place], got rash 5/7/19 ant chest and up back of neck, felt swollen like allergic reaction, itchy, was given Medrol 80 needed. Disp:  Rfl:    Ascorbic Acid (VITAMIN C) 100 MG Oral Tab Take 100 mg by mouth daily. Disp:  Rfl:    Zinc Sulfate (ZINC 15 OR) Take by mouth. Disp:  Rfl:    Cyanocobalamin (VITAMIN B12 OR) Take 1 tablet by mouth daily.  Disp:  Rfl:    Vitamin B-1 (VI resource strain: Not on file      Food insecurity:        Worry: Not on file        Inability: Not on file      Transportation needs:        Medical: Not on file        Non-medical: Not on file    Tobacco Use      Smoking status: Never Smoker      Smokeles Exam:     /74 (BP Location: Right arm, Patient Position: Sitting, Cuff Size: adult)   Pulse 83   Temp 98.5 °F (36.9 °C) (Oral)   Resp 16   Ht 66.75\"   Wt 182 lb 12.8 oz   SpO2 98%   BMI 28.85 kg/m²      Anterior chest rash moderate erythema conf

## 2019-05-23 NOTE — PATIENT INSTRUCTIONS
Prednisone 20 mg daily for a week and try hydroxyzine 1/2 tablet during daily every 8 hours as needed, can take a whole tab at night.      Consider dermatology referral if not improving or if worse again after this steroid burst.

## 2019-06-04 NOTE — TELEPHONE ENCOUNTER
[facility] called to check status of medical records request.  Let them know that the request was forwarded to [employer] on 5.23.19.

## 2020-02-14 ENCOUNTER — TELEPHONE (OUTPATIENT)
Dept: FAMILY MEDICINE CLINIC | Facility: CLINIC | Age: 70
End: 2020-02-14

## 2020-06-14 ENCOUNTER — PATIENT MESSAGE (OUTPATIENT)
Dept: FAMILY MEDICINE CLINIC | Facility: CLINIC | Age: 70
End: 2020-06-14

## 2020-06-16 NOTE — TELEPHONE ENCOUNTER
From: Sandrea Nageotte  To: David Duong MD  Sent: 6/14/2020 4:42 PM CDT  Subject: Other    I made an appointment for a 3D mammogram do I need doctor's orders?     I have a physical on Thursday 6/18 I just want to make sure that the time alloted is

## 2020-06-18 ENCOUNTER — OFFICE VISIT (OUTPATIENT)
Dept: FAMILY MEDICINE CLINIC | Facility: CLINIC | Age: 70
End: 2020-06-18
Payer: COMMERCIAL

## 2020-06-18 ENCOUNTER — HOSPITAL ENCOUNTER (OUTPATIENT)
Dept: MAMMOGRAPHY | Age: 70
Discharge: HOME | End: 2020-06-18
Attending: FAMILY MEDICINE
Payer: MEDICARE

## 2020-06-18 VITALS
SYSTOLIC BLOOD PRESSURE: 110 MMHG | RESPIRATION RATE: 16 BRPM | WEIGHT: 188 LBS | TEMPERATURE: 98 F | DIASTOLIC BLOOD PRESSURE: 62 MMHG | HEIGHT: 67 IN | HEART RATE: 93 BPM | OXYGEN SATURATION: 97 % | BODY MASS INDEX: 29.51 KG/M2

## 2020-06-18 DIAGNOSIS — R22.32 MASS OF LEFT HAND: ICD-10-CM

## 2020-06-18 DIAGNOSIS — Z00.00 ENCOUNTER FOR ANNUAL HEALTH EXAMINATION: Primary | ICD-10-CM

## 2020-06-18 DIAGNOSIS — M43.16 SPONDYLOLISTHESIS OF LUMBAR REGION: ICD-10-CM

## 2020-06-18 DIAGNOSIS — Z12.31 ENCOUNTER FOR SCREENING MAMMOGRAM FOR BREAST CANCER: ICD-10-CM

## 2020-06-18 DIAGNOSIS — M19.049 PRIMARY LOCALIZED OSTEOARTHROSIS OF HAND, UNSPECIFIED LATERALITY: ICD-10-CM

## 2020-06-18 DIAGNOSIS — M85.80 OSTEOPENIA, UNSPECIFIED LOCATION: ICD-10-CM

## 2020-06-18 DIAGNOSIS — E78.00 PURE HYPERCHOLESTEROLEMIA: ICD-10-CM

## 2020-06-18 DIAGNOSIS — R39.15 URINARY URGENCY: ICD-10-CM

## 2020-06-18 DIAGNOSIS — Z51.81 MEDICATION MONITORING ENCOUNTER: ICD-10-CM

## 2020-06-18 PROCEDURE — 77067 SCR MAMMO BI INCL CAD: CPT | Performed by: FAMILY MEDICINE

## 2020-06-18 PROCEDURE — 77063 BREAST TOMOSYNTHESIS BI: CPT | Performed by: FAMILY MEDICINE

## 2020-06-18 PROCEDURE — 96160 PT-FOCUSED HLTH RISK ASSMT: CPT | Performed by: FAMILY MEDICINE

## 2020-06-18 PROCEDURE — 99397 PER PM REEVAL EST PAT 65+ YR: CPT | Performed by: FAMILY MEDICINE

## 2020-06-18 PROCEDURE — G0439 PPPS, SUBSEQ VISIT: HCPCS | Performed by: FAMILY MEDICINE

## 2020-06-18 RX ORDER — SOLIFENACIN SUCCINATE 5 MG/1
5 TABLET, FILM COATED ORAL DAILY
Qty: 30 TABLET | Refills: 1 | Status: SHIPPED | OUTPATIENT
Start: 2020-06-18 | End: 2021-02-05 | Stop reason: CLARIF

## 2020-06-18 NOTE — PATIENT INSTRUCTIONS
voltaren gel OTC can be helpful for hand joints. If worse bladder problems, can try bladder medicine (generic for Vesicare) 5 mg at bedtime. Would try if 3 times a week or more loss of urine. Try to decrease fluids within 2 hours of bedtime.      Check Medicare, and non-screening if indicated for medical reasons Electrocardiogram date Routine EKG is not a screening covered service except at the [facility]    Abdominal aortic aneurysm screening (once between ages 73-68) IPPE only No results for this patient. Chlamydia  Annually if high risk No results found for: CHLAMYDIA No flowsheet data found.     Screening Mammogram      Mammogram    Recommend Annually to at least age 76, and as needed after 76 Mammogram due on 10/24/2019 Please get th different types of Advance Directives. It also has the State forms available on it's website for anyone to review and print using their home computer and printer. (the forms are also available in [address])  [email or web address]  This link also has informa

## 2020-06-18 NOTE — PROGRESS NOTES
HPI:   Dearjane Jefferson is a 78year old female who presents for a Medicare Subsequent Annual Wellness visit (Pt already had Initial Annual Wellness).     Here for well exam  Her last annual assessment has been over 1 year: Annual Physical due on 03/27/2 screened for Alcohol abuse and had a score of 0 so is at low risk.     Patient Care Team: Patient Care Team:  Anish Barrios MD as PCP - General (Family Practice)  Anish Barrios MD as PCP - [facility]    Patient Active Problem List:     Giovana Cough mouth.  Cyanocobalamin (VITAMIN B12 OR), Take 1 tablet by mouth daily. Vitamin B-1 (VITAMIN B1) 100 MG Oral Tab, Take 100 mg by mouth daily. Turmeric 450 MG Oral Cap, Take  by mouth.   Multiple Vitamins-Minerals (MULTIVITAMIN & MINERAL OR), Take  by mouth her mother. SOCIAL HISTORY:   She  reports that she has never smoked. She has never used smokeless tobacco. She reports current alcohol use. She reports that she does not use drugs. REVIEW OF SYSTEMS:      Negative except HPI    Had shingles in 45s. supraclavicular, and axillary nodes normal   Neurologic: Normal    and Breasts:  normal appearance, no masses or tenderness    Vaccination History     Immunization History   Administered Date(s) Administered   • FLU VACC High Dose 65 YRS & Older PRSV Free level?: Appropriate Exercise  How would you describe your daily physical activity?: Moderate  How would you describe your current health state?: Good  How do you maintain positive mental well-being?: Social Interaction;Puzzles;Games; Visiting Friends; Shobha Shaffer Mammogram      Mammogram Annually to 76, then as discussed Mammogram due on 06/18/2021 Update Health Maintenance if applicable     Immunizations (Update Immunization Activity if applicable)     Influenza  Covered Annually 10/25/2018 Please get every year Diabetics HgbA1C (%)   Date Value   12/07/2016 5.1    No flowsheet data found.     Fasting Blood Sugar (FSB)   Patient must be diagnosed with one of the following:   • Hypertension   • Dyslipidemia   • Obesity (BMI ³30 kg/m2)   • Previous elevated impaired or any previous visit. No flowsheet data found. Fecal Occult Blood   Covered Annually No results found for: FOB, OCCULTSTOOL No flowsheet data found.      Barium Enema-   uncomfortable but covered  Covered but uncomfortable   Glaucoma Screening      Oph get once after your 65th birthday    Hepatitis B for Moderate/High Risk       No orders found for this or any previous visit.  Medium/high risk factors:   End-stage renal disease   Hemophiliacs who received Factor VIII or IX concentrates   Clients of instit list are recommended by your physician but may not be covered, or covered at this frequency, by your insurer. Please check with your insurance carrier before scheduling to verify coverage.    PREVENTATIVE SERVICES  INDICATIONS AND SCHEDULE Internal Lab or P more than 100 cigarettes in their lifetime   • Anyone with a family history    Colorectal Cancer Screening  Covered up to Age 76     Colonoscopy Screen   Covered every 10 years- more often if abnormal Colonoscopy due on 01/10/2023 Update [facility] ANTIG    Please get every year    Pneumococcal 13 (Prevnar)  Covered Once after 65 Orders placed or performed in visit on 12/02/16   • PNEUMOCOCCAL VACC, 13 JOSE IM    Please get once after your 65th birthday    Pneumococcal 23 (Pneumovax)  Covered Once aft

## 2020-06-24 PROBLEM — M48.062 NEUROGENIC CLAUDICATION DUE TO LUMBAR SPINAL STENOSIS: Status: RESOLVED | Noted: 2019-10-28 | Resolved: 2020-06-24

## 2020-06-24 PROBLEM — M54.16 LEFT LUMBAR RADICULITIS: Status: RESOLVED | Noted: 2019-12-13 | Resolved: 2020-06-24

## 2020-06-24 PROBLEM — M54.16 LEFT LUMBAR RADICULITIS: Status: ACTIVE | Noted: 2019-12-13

## 2020-06-24 PROBLEM — M25.552 LEFT HIP PAIN: Status: ACTIVE | Noted: 2019-10-28

## 2020-06-24 PROBLEM — M48.062 NEUROGENIC CLAUDICATION DUE TO LUMBAR SPINAL STENOSIS: Status: ACTIVE | Noted: 2019-10-28

## 2020-06-24 PROBLEM — M25.552 LEFT HIP PAIN: Status: RESOLVED | Noted: 2019-10-28 | Resolved: 2020-06-24

## 2020-06-24 PROBLEM — M43.16 SPONDYLOLISTHESIS OF LUMBAR REGION: Status: ACTIVE | Noted: 2019-10-28

## 2020-06-29 PROBLEM — R39.15 URINARY URGENCY: Status: ACTIVE | Noted: 2020-06-29

## 2020-06-29 PROBLEM — R22.32 MASS OF LEFT HAND: Status: ACTIVE | Noted: 2020-06-29

## 2020-08-24 ENCOUNTER — PATIENT MESSAGE (OUTPATIENT)
Dept: FAMILY MEDICINE CLINIC | Facility: CLINIC | Age: 70
End: 2020-08-24

## 2020-08-24 NOTE — TELEPHONE ENCOUNTER
From: Phu Mims  To: Maurice Wan MD  Sent: 8/24/2020 2:52 PM CDT  Subject: Other    I did not get my labs done in June can I do so now? do I need a new script or do I just call and make an appointment?   also we are going to be around a newb

## 2020-09-01 ENCOUNTER — LAB ENCOUNTER (OUTPATIENT)
Dept: LAB | Age: 70
End: 2020-09-01
Attending: FAMILY MEDICINE
Payer: MEDICARE

## 2020-09-01 DIAGNOSIS — E78.00 PURE HYPERCHOLESTEROLEMIA: ICD-10-CM

## 2020-09-01 DIAGNOSIS — R79.89 ELEVATED SERUM CREATININE: Primary | ICD-10-CM

## 2020-09-01 DIAGNOSIS — Z51.81 MEDICATION MONITORING ENCOUNTER: ICD-10-CM

## 2020-09-01 LAB
ALBUMIN SERPL-MCNC: 3.9 G/DL (ref 3.4–5)
ALBUMIN/GLOB SERPL: 1 {RATIO} (ref 1–2)
ALP LIVER SERPL-CCNC: 78 U/L (ref 55–142)
ALT SERPL-CCNC: 27 U/L (ref 13–56)
ANION GAP SERPL CALC-SCNC: <0 MMOL/L (ref 0–18)
AST SERPL-CCNC: 22 U/L (ref 15–37)
BILIRUB SERPL-MCNC: 0.3 MG/DL (ref 0.1–2)
BUN BLD-MCNC: 21 MG/DL (ref 7–18)
BUN/CREAT SERPL: 20 (ref 10–20)
CALCIUM BLD-MCNC: 9.9 MG/DL (ref 8.5–10.1)
CHLORIDE SERPL-SCNC: 108 MMOL/L (ref 98–112)
CHOLEST SMN-MCNC: 231 MG/DL (ref ?–200)
CO2 SERPL-SCNC: 30 MMOL/L (ref 21–32)
CREAT BLD-MCNC: 1.05 MG/DL (ref 0.55–1.02)
GLOBULIN PLAS-MCNC: 4.1 G/DL (ref 2.8–4.4)
GLUCOSE BLD-MCNC: 93 MG/DL (ref 70–99)
HDLC SERPL-MCNC: 75 MG/DL (ref 40–59)
LDLC SERPL CALC-MCNC: 118 MG/DL (ref ?–100)
M PROTEIN MFR SERPL ELPH: 8 G/DL (ref 6.4–8.2)
NONHDLC SERPL-MCNC: 156 MG/DL (ref ?–130)
OSMOLALITY SERPL CALC.SUM OF ELEC: 287 MOSM/KG (ref 275–295)
PATIENT FASTING Y/N/NP: YES
PATIENT FASTING Y/N/NP: YES
POTASSIUM SERPL-SCNC: 3.7 MMOL/L (ref 3.5–5.1)
SODIUM SERPL-SCNC: 137 MMOL/L (ref 136–145)
TRIGL SERPL-MCNC: 192 MG/DL (ref 30–149)
VLDLC SERPL CALC-MCNC: 38 MG/DL (ref 0–30)

## 2020-09-01 PROCEDURE — 36415 COLL VENOUS BLD VENIPUNCTURE: CPT

## 2020-09-01 PROCEDURE — 80053 COMPREHEN METABOLIC PANEL: CPT

## 2020-09-01 PROCEDURE — 80061 LIPID PANEL: CPT

## 2020-09-21 ENCOUNTER — OFFICE VISIT (OUTPATIENT)
Dept: FAMILY MEDICINE CLINIC | Facility: CLINIC | Age: 70
End: 2020-09-21
Payer: COMMERCIAL

## 2020-09-21 ENCOUNTER — TELEPHONE (OUTPATIENT)
Dept: FAMILY MEDICINE CLINIC | Facility: CLINIC | Age: 70
End: 2020-09-21

## 2020-09-21 ENCOUNTER — PATIENT MESSAGE (OUTPATIENT)
Dept: FAMILY MEDICINE CLINIC | Facility: CLINIC | Age: 70
End: 2020-09-21

## 2020-09-21 VITALS
HEIGHT: 67 IN | BODY MASS INDEX: 30.45 KG/M2 | RESPIRATION RATE: 16 BRPM | SYSTOLIC BLOOD PRESSURE: 120 MMHG | DIASTOLIC BLOOD PRESSURE: 78 MMHG | HEART RATE: 85 BPM | TEMPERATURE: 98 F | OXYGEN SATURATION: 99 % | WEIGHT: 194 LBS

## 2020-09-21 DIAGNOSIS — L73.9 FOLLICULITIS: Primary | ICD-10-CM

## 2020-09-21 DIAGNOSIS — R21 RASH DUE TO ALLERGY: ICD-10-CM

## 2020-09-21 DIAGNOSIS — T78.40XA RASH DUE TO ALLERGY: ICD-10-CM

## 2020-09-21 DIAGNOSIS — N76.2 VULVAR CELLULITIS: ICD-10-CM

## 2020-09-21 PROCEDURE — 3078F DIAST BP <80 MM HG: CPT | Performed by: FAMILY MEDICINE

## 2020-09-21 PROCEDURE — 3074F SYST BP LT 130 MM HG: CPT | Performed by: FAMILY MEDICINE

## 2020-09-21 PROCEDURE — 99214 OFFICE O/P EST MOD 30 MIN: CPT | Performed by: FAMILY MEDICINE

## 2020-09-21 PROCEDURE — 3008F BODY MASS INDEX DOCD: CPT | Performed by: FAMILY MEDICINE

## 2020-09-21 RX ORDER — PREDNISONE 10 MG/1
TABLET ORAL
Qty: 21 TABLET | Refills: 0 | Status: SHIPPED | OUTPATIENT
Start: 2020-09-21 | End: 2020-09-27

## 2020-09-21 RX ORDER — CEPHALEXIN 500 MG/1
500 CAPSULE ORAL 4 TIMES DAILY
Qty: 20 CAPSULE | Refills: 0 | Status: SHIPPED | OUTPATIENT
Start: 2020-09-21 | End: 2020-10-01

## 2020-09-21 NOTE — TELEPHONE ENCOUNTER
Tank 5 stated he needed clarification on Cephalexin. Order states 4 x / day for 10 days. Qty 20? Should qty be 40 or should BID be 2 x / day for 10 days? Patient is waiting at pharmacy.

## 2020-09-21 NOTE — PROGRESS NOTES
Kuldeep Sun is a 69year old female.   Patient presents with:  Derm Problem    HPI:   Kuldeep Sun is a 69year old female complaining of a pimple like lesion in the vulvar area 2 weeks ago, painful, states started washing with hot water and influenza    •  (normal spontaneous vaginal delivery) 1972, 1974    x2   • Osteopenia     quit fosamax after 1 year   • Pain in joint, multiple sites    • Polydipsia    • Pure hypercholesterolemia    • Routine general medical examination at a health ca mouth daily for 1 day, THEN 5 tablets (50 mg total) daily for 1 day, THEN 4 tablets (40 mg total) daily for 1 day, THEN 3 tablets (30 mg total) daily for 1 day, THEN 2 tablets (20 mg total) daily for 1 day, THEN 1 tablet (10 mg total) daily for 1 day.   -

## 2020-09-21 NOTE — TELEPHONE ENCOUNTER
cephALEXin 500 MG Oral Cap 20 capsule 0 9/21/2020 10/1/2020   Sig:   Take 1 capsule (500 mg total) by mouth 4 (four) times daily for 10 days. Please review and advise. Thank you.

## 2020-09-21 NOTE — TELEPHONE ENCOUNTER
Scheduled an appointment with Dr. Landen Borden as patient stated she needed to be seen today for a rash. Same place as two years ago. States it was from the sun last time. States she needed a cream and steroid pack. Only 20 minute appointments available.  Robson Blackburn

## 2020-09-21 NOTE — PATIENT INSTRUCTIONS
Please continue to take over the counter benadryl as needed. If rash worsens please call and follow up.

## 2020-09-22 NOTE — TELEPHONE ENCOUNTER
From: Bina Mckeon  To: Drew Veronica MD  Sent: 9/21/2020 7:14 PM CDT  Subject: Prescription Question    There seems to be a question about the antibiotic I received today . how often and how many do I take?  The pharmacy called do i take 1 caps

## 2020-11-04 ENCOUNTER — LAB ENCOUNTER (OUTPATIENT)
Dept: LAB | Age: 70
End: 2020-11-04
Attending: FAMILY MEDICINE
Payer: MEDICARE

## 2020-11-04 DIAGNOSIS — R79.89 ELEVATED SERUM CREATININE: ICD-10-CM

## 2020-11-04 PROCEDURE — 80048 BASIC METABOLIC PNL TOTAL CA: CPT

## 2020-11-04 PROCEDURE — 36415 COLL VENOUS BLD VENIPUNCTURE: CPT

## 2020-11-06 NOTE — PROGRESS NOTES
Lio Godinez IS A 78year old female HERE FOR Patient presents with:  Cough: seen @ [place] [address] [address] 7/15/17, no improvement, no CXR done       History of present illness:        In June (6/8) saw Dr. Ann Downing, dx Left IT band & bursitis, started therapy 6/13, he diabetes mellitus    • Screening for lipoid disorders    • Screening for malignant neoplasm of the rectum    • Screening for other and unspecified deficiency anemia    • Screening for thyroid disorder    • Special screening for malignant neoplasms, colon Father      liver, ulcer bronchitis   • Arthritis Father    • Arthritis Mother    • Arthritis Other      aunts, uncles   • Thyroid Disorder Sister      thyroid/endocrine   • Eye Problems Mother      cataract   • Glaucoma Mother    • Ear Problems Father for cough. Diagnoses and all orders for this visit:    Diarrhea due to drug  -     CBC WITH DIFFERENTIAL WITH PLATELET; Future  -     COMP METABOLIC PANEL (14); Future    Fever in other diseases  -     CBC WITH DIFFERENTIAL WITH PLATELET;  Future  - medicine such as Imodium (loperamide) 2 mg preventifely before meals, or after a loose bowel movement, to decrease bouts of diarrhea. 17-Oct-2020 18:16

## 2021-01-27 ENCOUNTER — HOSPITAL ENCOUNTER (INPATIENT)
Facility: HOSPITAL | Age: 71
LOS: 5 days | Discharge: HOME | DRG: 391 | End: 2021-02-01
Attending: EMERGENCY MEDICINE | Admitting: HOSPITALIST
Payer: MEDICARE

## 2021-01-27 ENCOUNTER — LAB ENCOUNTER (OUTPATIENT)
Dept: LAB | Facility: HOSPITAL | Age: 71
DRG: 391 | End: 2021-01-27
Attending: FAMILY MEDICINE
Payer: MEDICARE

## 2021-01-27 ENCOUNTER — OFFICE VISIT (OUTPATIENT)
Dept: FAMILY MEDICINE CLINIC | Facility: CLINIC | Age: 71
End: 2021-01-27
Payer: COMMERCIAL

## 2021-01-27 ENCOUNTER — TELEPHONE (OUTPATIENT)
Dept: FAMILY MEDICINE CLINIC | Facility: CLINIC | Age: 71
End: 2021-01-27

## 2021-01-27 ENCOUNTER — APPOINTMENT (OUTPATIENT)
Dept: CT IMAGING | Facility: HOSPITAL | Age: 71
DRG: 391 | End: 2021-01-27
Attending: EMERGENCY MEDICINE
Payer: MEDICARE

## 2021-01-27 VITALS
WEIGHT: 188 LBS | OXYGEN SATURATION: 98 % | DIASTOLIC BLOOD PRESSURE: 66 MMHG | TEMPERATURE: 98 F | RESPIRATION RATE: 18 BRPM | HEIGHT: 67 IN | SYSTOLIC BLOOD PRESSURE: 130 MMHG | BODY MASS INDEX: 29.51 KG/M2 | HEART RATE: 106 BPM

## 2021-01-27 DIAGNOSIS — D72.825 BANDEMIA: ICD-10-CM

## 2021-01-27 DIAGNOSIS — R53.83 FATIGUE, UNSPECIFIED TYPE: ICD-10-CM

## 2021-01-27 DIAGNOSIS — B34.9 VIRAL SYNDROME: ICD-10-CM

## 2021-01-27 DIAGNOSIS — R10.84 GENERALIZED ABDOMINAL PAIN: ICD-10-CM

## 2021-01-27 DIAGNOSIS — K57.20 DIVERTICULITIS OF LARGE INTESTINE WITH PERFORATION WITHOUT BLEEDING: Primary | ICD-10-CM

## 2021-01-27 DIAGNOSIS — R53.83 FATIGUE, UNSPECIFIED TYPE: Primary | ICD-10-CM

## 2021-01-27 LAB
ALBUMIN SERPL-MCNC: 2.9 G/DL (ref 3.4–5)
ALBUMIN/GLOB SERPL: 0.5 {RATIO} (ref 1–2)
ALP LIVER SERPL-CCNC: 141 U/L
ALT SERPL-CCNC: 39 U/L
ANION GAP SERPL CALC-SCNC: 6 MMOL/L (ref 0–18)
AST SERPL-CCNC: 22 U/L (ref 15–37)
BASOPHILS # BLD AUTO: 0.05 X10(3) UL (ref 0–0.2)
BASOPHILS NFR BLD AUTO: 0.3 %
BILIRUB SERPL-MCNC: 0.3 MG/DL (ref 0.1–2)
BILIRUB UR QL STRIP.AUTO: NEGATIVE
BUN BLD-MCNC: 8 MG/DL (ref 7–18)
BUN/CREAT SERPL: 10.4 (ref 10–20)
CALCIUM BLD-MCNC: 10 MG/DL (ref 8.5–10.1)
CHLORIDE SERPL-SCNC: 100 MMOL/L (ref 98–112)
CLARITY UR REFRACT.AUTO: CLEAR
CO2 SERPL-SCNC: 28 MMOL/L (ref 21–32)
COLOR UR AUTO: YELLOW
CREAT BLD-MCNC: 0.77 MG/DL
DEPRECATED RDW RBC AUTO: 43.3 FL (ref 35.1–46.3)
EOSINOPHIL # BLD AUTO: 0.01 X10(3) UL (ref 0–0.7)
EOSINOPHIL NFR BLD AUTO: 0.1 %
ERYTHROCYTE [DISTWIDTH] IN BLOOD BY AUTOMATED COUNT: 13.3 % (ref 11–15)
GLOBULIN PLAS-MCNC: 5.6 G/DL (ref 2.8–4.4)
GLUCOSE BLD-MCNC: 99 MG/DL (ref 70–99)
GLUCOSE UR STRIP.AUTO-MCNC: NEGATIVE MG/DL
HCT VFR BLD AUTO: 39 %
HGB BLD-MCNC: 12.5 G/DL
IMM GRANULOCYTES # BLD AUTO: 0.1 X10(3) UL (ref 0–1)
IMM GRANULOCYTES NFR BLD: 0.6 %
KETONES UR STRIP.AUTO-MCNC: 80 MG/DL
LEUKOCYTE ESTERASE UR QL STRIP.AUTO: NEGATIVE
LIPASE SERPL-CCNC: 49 U/L (ref 73–393)
LYMPHOCYTES # BLD AUTO: 0.87 X10(3) UL (ref 1–4)
LYMPHOCYTES NFR BLD AUTO: 5.1 %
M PROTEIN MFR SERPL ELPH: 8.5 G/DL (ref 6.4–8.2)
MCH RBC QN AUTO: 28.4 PG (ref 26–34)
MCHC RBC AUTO-ENTMCNC: 32.1 G/DL (ref 31–37)
MCV RBC AUTO: 88.6 FL
MONOCYTES # BLD AUTO: 0.77 X10(3) UL (ref 0.1–1)
MONOCYTES NFR BLD AUTO: 4.5 %
NEUTROPHILS # BLD AUTO: 15.35 X10 (3) UL (ref 1.5–7.7)
NEUTROPHILS # BLD AUTO: 15.35 X10(3) UL (ref 1.5–7.7)
NEUTROPHILS NFR BLD AUTO: 89.4 %
NITRITE UR QL STRIP.AUTO: NEGATIVE
OSMOLALITY SERPL CALC.SUM OF ELEC: 276 MOSM/KG (ref 275–295)
PATIENT FASTING Y/N/NP: NO
PH UR STRIP.AUTO: 7 [PH] (ref 4.5–8)
PLATELET # BLD AUTO: 480 10(3)UL (ref 150–450)
POTASSIUM SERPL-SCNC: 3.4 MMOL/L (ref 3.5–5.1)
PROT UR STRIP.AUTO-MCNC: 100 MG/DL
RBC # BLD AUTO: 4.4 X10(6)UL
SARS-COV-2 RNA RESP QL NAA+PROBE: NOT DETECTED
SODIUM SERPL-SCNC: 134 MMOL/L (ref 136–145)
SP GR UR STRIP.AUTO: 1.02 (ref 1–1.03)
UROBILINOGEN UR STRIP.AUTO-MCNC: <2 MG/DL
WBC # BLD AUTO: 17.2 X10(3) UL (ref 4–11)

## 2021-01-27 PROCEDURE — 3078F DIAST BP <80 MM HG: CPT | Performed by: FAMILY MEDICINE

## 2021-01-27 PROCEDURE — 81001 URINALYSIS AUTO W/SCOPE: CPT

## 2021-01-27 PROCEDURE — 99223 1ST HOSP IP/OBS HIGH 75: CPT | Performed by: HOSPITALIST

## 2021-01-27 PROCEDURE — 74177 CT ABD & PELVIS W/CONTRAST: CPT | Performed by: EMERGENCY MEDICINE

## 2021-01-27 PROCEDURE — 99215 OFFICE O/P EST HI 40 MIN: CPT | Performed by: FAMILY MEDICINE

## 2021-01-27 PROCEDURE — 85025 COMPLETE CBC W/AUTO DIFF WBC: CPT

## 2021-01-27 PROCEDURE — 36415 COLL VENOUS BLD VENIPUNCTURE: CPT

## 2021-01-27 PROCEDURE — 80053 COMPREHEN METABOLIC PANEL: CPT

## 2021-01-27 PROCEDURE — 3008F BODY MASS INDEX DOCD: CPT | Performed by: FAMILY MEDICINE

## 2021-01-27 PROCEDURE — 3075F SYST BP GE 130 - 139MM HG: CPT | Performed by: FAMILY MEDICINE

## 2021-01-27 RX ORDER — ONDANSETRON 2 MG/ML
4 INJECTION INTRAMUSCULAR; INTRAVENOUS ONCE
Status: COMPLETED | OUTPATIENT
Start: 2021-01-27 | End: 2021-01-27

## 2021-01-27 RX ORDER — HYDROMORPHONE HYDROCHLORIDE 1 MG/ML
0.4 INJECTION, SOLUTION INTRAMUSCULAR; INTRAVENOUS; SUBCUTANEOUS EVERY 2 HOUR PRN
Status: DISCONTINUED | OUTPATIENT
Start: 2021-01-27 | End: 2021-02-01

## 2021-01-27 RX ORDER — METOCLOPRAMIDE 5 MG/1
5 TABLET ORAL EVERY 6 HOURS PRN
Qty: 10 TABLET | Refills: 0 | Status: SHIPPED | OUTPATIENT
Start: 2021-01-27 | End: 2021-02-05 | Stop reason: CLARIF

## 2021-01-27 RX ORDER — METRONIDAZOLE 500 MG/100ML
500 INJECTION, SOLUTION INTRAVENOUS ONCE
Status: COMPLETED | OUTPATIENT
Start: 2021-01-27 | End: 2021-01-27

## 2021-01-27 RX ORDER — ONDANSETRON 2 MG/ML
4 INJECTION INTRAMUSCULAR; INTRAVENOUS EVERY 4 HOURS PRN
Status: DISCONTINUED | OUTPATIENT
Start: 2021-01-27 | End: 2021-02-01

## 2021-01-27 RX ORDER — HYDROMORPHONE HYDROCHLORIDE 1 MG/ML
0.8 INJECTION, SOLUTION INTRAMUSCULAR; INTRAVENOUS; SUBCUTANEOUS EVERY 2 HOUR PRN
Status: DISCONTINUED | OUTPATIENT
Start: 2021-01-27 | End: 2021-02-01

## 2021-01-27 RX ORDER — OMEPRAZOLE 40 MG/1
40 CAPSULE, DELAYED RELEASE ORAL DAILY
Qty: 15 CAPSULE | Refills: 0 | Status: ON HOLD | OUTPATIENT
Start: 2021-01-27 | End: 2021-04-19

## 2021-01-27 RX ORDER — MORPHINE SULFATE 4 MG/ML
4 INJECTION, SOLUTION INTRAMUSCULAR; INTRAVENOUS EVERY 30 MIN PRN
Status: ACTIVE | OUTPATIENT
Start: 2021-01-27 | End: 2021-01-28

## 2021-01-27 RX ORDER — SODIUM CHLORIDE 9 MG/ML
INJECTION, SOLUTION INTRAVENOUS CONTINUOUS
Status: DISCONTINUED | OUTPATIENT
Start: 2021-01-27 | End: 2021-02-01

## 2021-01-27 RX ORDER — LEVOFLOXACIN 5 MG/ML
750 INJECTION, SOLUTION INTRAVENOUS ONCE
Status: COMPLETED | OUTPATIENT
Start: 2021-01-27 | End: 2021-01-27

## 2021-01-27 RX ORDER — METOCLOPRAMIDE HYDROCHLORIDE 5 MG/ML
10 INJECTION INTRAMUSCULAR; INTRAVENOUS EVERY 8 HOURS PRN
Status: DISCONTINUED | OUTPATIENT
Start: 2021-01-27 | End: 2021-02-01

## 2021-01-27 RX ORDER — NAPROXEN SODIUM 220 MG
220 TABLET ORAL
COMMUNITY
End: 2021-02-05 | Stop reason: CLARIF

## 2021-01-27 RX ORDER — SODIUM CHLORIDE 9 MG/ML
INJECTION, SOLUTION INTRAVENOUS CONTINUOUS
Status: ACTIVE | OUTPATIENT
Start: 2021-01-27 | End: 2021-01-28

## 2021-01-27 RX ORDER — ONDANSETRON 2 MG/ML
4 INJECTION INTRAMUSCULAR; INTRAVENOUS EVERY 6 HOURS PRN
Status: DISCONTINUED | OUTPATIENT
Start: 2021-01-27 | End: 2021-02-01

## 2021-01-27 RX ORDER — HYDROMORPHONE HYDROCHLORIDE 1 MG/ML
0.2 INJECTION, SOLUTION INTRAMUSCULAR; INTRAVENOUS; SUBCUTANEOUS EVERY 2 HOUR PRN
Status: DISCONTINUED | OUTPATIENT
Start: 2021-01-27 | End: 2021-02-01

## 2021-01-27 NOTE — TELEPHONE ENCOUNTER
Spoke to patient's  who states since patient had Pfizer covid vaccine on 1/14/21 she has not felt well. Had a severe HA which has since resolved. States now is \"more exhausted than I've ever seen her. \"  Has been constipated and has severe bloatin

## 2021-01-27 NOTE — PROGRESS NOTES
/66   Pulse 106   Temp 97.6 °F (36.4 °C) (Temporal)   Resp 18   Ht 5' 7\" (1.702 m)   Wt 188 lb (85.3 kg)   SpO2 98%   BMI 29.44 kg/m²               Patient presents with:  Fatigue  Body ache and/or chills  Constipation       HPI;     Modesta A Milling 121-315-203 MG Oral Tab Take  by mouth.         Past Medical History:   Diagnosis Date   • Arthritis    • Atrial fibrillation ([facility] [address]    • Family history of malignant neoplasm of urinary bladder    • Generalized osteoarthrosis, involving multiple sites     OA Diagnoses and all orders for this visit:    Fatigue, unspecified type  -     CBC WITH DIFFERENTIAL WITH PLATELET; Future  -     COMP METABOLIC PANEL (14); Future  -     URINALYSIS, ROUTINE; Future  Viral syndrome  -     SARS-COV-2 BY PCR ();  Future Urinalysis, Routine      Symptomatic COVID-19 Testing by PCR () [E]            Diana Fong MD   [address] [address]    Electronically signed    This dictation was performed with a verbal recognition pr

## 2021-01-27 NOTE — PROGRESS NOTES
The blood work showed elevated white count at 17,000 as well as low potassium, urine was negative for infection but did show elevated specific gravity as well as ketones pointing towards dehydration  Discussed the results with the patient's   Her ab

## 2021-01-27 NOTE — TELEPHONE ENCOUNTER
Pt c/o severe exhaustion after getting covid vaccine on 1/14, also constipated and has a lot of gas and bloating, requesting to sp w/ nurse for otc med recc

## 2021-01-28 ENCOUNTER — APPOINTMENT (OUTPATIENT)
Dept: CT IMAGING | Facility: HOSPITAL | Age: 71
DRG: 391 | End: 2021-01-28
Attending: SURGERY
Payer: MEDICARE

## 2021-01-28 PROBLEM — E87.6 HYPOKALEMIA: Status: ACTIVE | Noted: 2021-01-28

## 2021-01-28 LAB
ANION GAP SERPL CALC-SCNC: 8 MMOL/L (ref 0–18)
BASOPHILS # BLD AUTO: 0.03 X10(3) UL (ref 0–0.2)
BASOPHILS NFR BLD AUTO: 0.2 %
BUN BLD-MCNC: 7 MG/DL (ref 7–18)
BUN/CREAT SERPL: 11.3 (ref 10–20)
C DIFF TOX B STL QL: NEGATIVE
CALCIUM BLD-MCNC: 9 MG/DL (ref 8.5–10.1)
CHLORIDE SERPL-SCNC: 107 MMOL/L (ref 98–112)
CO2 SERPL-SCNC: 24 MMOL/L (ref 21–32)
CREAT BLD-MCNC: 0.62 MG/DL
DEPRECATED RDW RBC AUTO: 45 FL (ref 35.1–46.3)
EOSINOPHIL # BLD AUTO: 0.05 X10(3) UL (ref 0–0.7)
EOSINOPHIL NFR BLD AUTO: 0.3 %
ERYTHROCYTE [DISTWIDTH] IN BLOOD BY AUTOMATED COUNT: 13.6 % (ref 11–15)
GLUCOSE BLD-MCNC: 82 MG/DL (ref 70–99)
HCT VFR BLD AUTO: 31.9 %
HGB BLD-MCNC: 10.2 G/DL
IMM GRANULOCYTES # BLD AUTO: 0.14 X10(3) UL (ref 0–1)
IMM GRANULOCYTES NFR BLD: 0.9 %
INR BLD: 1.29 (ref 0.89–1.11)
LYMPHOCYTES # BLD AUTO: 1.53 X10(3) UL (ref 1–4)
LYMPHOCYTES NFR BLD AUTO: 10 %
MCH RBC QN AUTO: 29 PG (ref 26–34)
MCHC RBC AUTO-ENTMCNC: 32 G/DL (ref 31–37)
MCV RBC AUTO: 90.6 FL
MONOCYTES # BLD AUTO: 1.01 X10(3) UL (ref 0.1–1)
MONOCYTES NFR BLD AUTO: 6.6 %
NEUTROPHILS # BLD AUTO: 12.52 X10 (3) UL (ref 1.5–7.7)
NEUTROPHILS # BLD AUTO: 12.52 X10(3) UL (ref 1.5–7.7)
NEUTROPHILS NFR BLD AUTO: 82 %
OSMOLALITY SERPL CALC.SUM OF ELEC: 285 MOSM/KG (ref 275–295)
PLATELET # BLD AUTO: 348 10(3)UL (ref 150–450)
POTASSIUM SERPL-SCNC: 3.4 MMOL/L (ref 3.5–5.1)
PSA SERPL DL<=0.01 NG/ML-MCNC: 16.5 SECONDS (ref 12.4–14.6)
RBC # BLD AUTO: 3.52 X10(6)UL
SARS-COV-2 RNA RESP QL NAA+PROBE: NOT DETECTED
SODIUM SERPL-SCNC: 139 MMOL/L (ref 136–145)
WBC # BLD AUTO: 15.3 X10(3) UL (ref 4–11)

## 2021-01-28 PROCEDURE — 0W9J3ZX DRAINAGE OF PELVIC CAVITY, PERCUTANEOUS APPROACH, DIAGNOSTIC: ICD-10-PCS | Performed by: RADIOLOGY

## 2021-01-28 PROCEDURE — 99232 SBSQ HOSP IP/OBS MODERATE 35: CPT | Performed by: HOSPITALIST

## 2021-01-28 PROCEDURE — 99152 MOD SED SAME PHYS/QHP 5/>YRS: CPT | Performed by: SURGERY

## 2021-01-28 PROCEDURE — 99223 1ST HOSP IP/OBS HIGH 75: CPT | Performed by: SURGERY

## 2021-01-28 PROCEDURE — 10009 FNA BX W/CT GDN 1ST LES: CPT | Performed by: SURGERY

## 2021-01-28 RX ORDER — LEVOFLOXACIN 5 MG/ML
750 INJECTION, SOLUTION INTRAVENOUS EVERY 24 HOURS
Status: DISCONTINUED | OUTPATIENT
Start: 2021-01-28 | End: 2021-02-01

## 2021-01-28 RX ORDER — METRONIDAZOLE 500 MG/100ML
500 INJECTION, SOLUTION INTRAVENOUS EVERY 8 HOURS
Status: DISCONTINUED | OUTPATIENT
Start: 2021-01-28 | End: 2021-02-01

## 2021-01-28 RX ORDER — MIDAZOLAM HYDROCHLORIDE 1 MG/ML
1 INJECTION INTRAMUSCULAR; INTRAVENOUS EVERY 5 MIN PRN
Status: DISCONTINUED | OUTPATIENT
Start: 2021-01-28 | End: 2021-01-28 | Stop reason: HOSPADM

## 2021-01-28 RX ORDER — POTASSIUM CHLORIDE 20 MEQ/1
40 TABLET, EXTENDED RELEASE ORAL EVERY 4 HOURS
Status: DISCONTINUED | OUTPATIENT
Start: 2021-01-28 | End: 2021-01-28

## 2021-01-28 RX ORDER — SODIUM CHLORIDE 9 MG/ML
INJECTION, SOLUTION INTRAVENOUS CONTINUOUS
Status: DISCONTINUED | OUTPATIENT
Start: 2021-01-28 | End: 2021-01-28 | Stop reason: HOSPADM

## 2021-01-28 RX ORDER — NALOXONE HYDROCHLORIDE 0.4 MG/ML
80 INJECTION, SOLUTION INTRAMUSCULAR; INTRAVENOUS; SUBCUTANEOUS AS NEEDED
Status: DISCONTINUED | OUTPATIENT
Start: 2021-01-28 | End: 2021-01-28 | Stop reason: HOSPADM

## 2021-01-28 RX ORDER — MIDAZOLAM HYDROCHLORIDE 1 MG/ML
INJECTION INTRAMUSCULAR; INTRAVENOUS
Status: DISPENSED
Start: 2021-01-28 | End: 2021-01-29

## 2021-01-28 RX ORDER — FLUMAZENIL 0.1 MG/ML
0.2 INJECTION, SOLUTION INTRAVENOUS AS NEEDED
Status: DISCONTINUED | OUTPATIENT
Start: 2021-01-28 | End: 2021-01-28 | Stop reason: HOSPADM

## 2021-01-28 NOTE — PAYOR COMM NOTE
--------------  ADMISSION REVIEW     Payor: [facility] [place] #:  [id number]  Authorization Number: [id number]    Admit date: 1/27/21  Admit time: 2237       Admitting Physician: Kathi Ness DO  Attending Physician:  Cristela Peabody •  (normal spontaneous vaginal delivery) [birth year]    x2   • Osteopenia     quit fosamax after 1 year   • Pain in joint, multiple sites    • Polydipsia    • Pure hypercholesterolemia    • Routine general medical examination at a health care facility normal.      Palpations: Abdomen is soft. Comments: Mild diffuse lower abdominal tenderness. Nondistended. No rebound or guarding. Musculoskeletal: Normal range of motion. Skin:     General: Skin is warm and dry.    Neurological:      Mental Stat the rectum with a focal, 5.1 x 5.8 x 6.5 cm, AP x T x cc dimension respectively focal fluid collection with an air-fluid level. Findings concerning for acute diverticulitis with a pericolonic abscess. ABDOMINAL WALL:  No mass or hernia.   URINARY BLADDER: Impression:  Diverticulitis of large intestine with perforation without bleeding  (primary encounter diagnosis)    Disposition:  Admit  1/27/2021  8:37 pm    Follow-up:  No follow-up provider specified.         Medications Prescribed:  Current Discharge Med Pepto-Bismol. No chest pain, shortness of breath, dizziness, lightheadedness, or syncope.     Past Medical History:  Past Medical History:   Diagnosis Date   • Arthritis    • Atrial fibrillation ([facility] [address]    • Family history of malignant neoplasm of urinary bl (osteoporosis) Mother    • Cancer Sister         bladder ca   • Cancer Other         skin ca, melanoma, basal cell, grandmother   • Heart Disease Other         CAD, fam hx   • Stroke Other         cerebrovascular disease, CVA, aunts, uncle   • Hypertension in the HPI. Physical Exam:    /70   Pulse 96   Temp 99.2 °F (37.3 °C) (Temporal)   Resp 16   Ht 5' 7\" (1.702 m)   Wt 185 lb (83.9 kg)   SpO2 95%   BMI 28.98 kg/m²   General: No acute distress. Alert and oriented x 3.   HEENT: Normocephalic atrauma discontinue isolation: Yes    Plan of care discussed with patient at bedside.     Marisol Haynes, DO  0/09/1727          **Certification      PHYSICIAN Certification of Need for Inpatient Hospitalization - Initial Certification    Patient will re Date of Service:  1/28/2021  9:42 AM                               Signed                                   Expand widget buttonCollapse widget button                                  customization button Screening for lipoid disorders             •     Screening for malignant neoplasm of the rectum             •     Screening for other and unspecified deficiency anemia             •     Screening for thyroid disorder             •     Special screening colorectal ca       •     Alcohol and Other Disorders Associated     Father             •     Arthritis     Father             •     Ear Problems     Father                       hearing loss       •     Other (digestive disorder)     Father pain or CASAS; no palpitations     GI: denies nausea, vomiting, constipation, diarrhea; no rectal bleeding; no heartburn    GENITAL/: no dysuria, urgency or frequency, no tea colored urine    MUSCULOSKELETAL: no joint complaints upper or lower extremities detection of the assay. An \"Inconclusive\" result for this test means that the presence or absence of COVID-19 viral RNA cannot be determined, and recollection and testing by a different method should be considered.           Test performed using the A Final       •     Albumin     01/27/2021     2.9*     3.4 - 5.0 g/dL     Final       •     Globulin      01/27/2021     5.6*     2.8 - 4.4 g/dL     Final       •     A/G Ratio     01/27/2021     0.5*     1.0 - 2.0     Final       •     FASTING     01/2 Final       •     WBC     01/27/2021     17.2*     4.0 - 11.0 x10(3) uL     Final       •     RBC     01/27/2021     4.40      3.80 - 5.30 x10(6)uL     Final       •     HGB     01/27/2021     12.5      12.0 - 16.0 g/dL     Final       •     HCT     01 antibiotics    Lengthy discussion with the patient and her . We discussed the pathophysiology of diverticulitis and the cause course and treatment of diverticulitis.   I advised them that if she does not resolve this current attack of diverticulitis

## 2021-01-28 NOTE — PLAN OF CARE
Problem: Patient/Family Goals  Goal: Patient/Family Long Term Goal  Description: Patient's Long Term Goal: discharge home    Interventions:  - manage pain  - surgery consult  - tolerate diet  - See additional Care Plan goals for specific interventions  O to reduce risk of injury  - Provide assistive devices as appropriate  - Consider OT/PT consult to assist with strengthening/mobility  - Encourage toileting schedule  Outcome: Progressing     Problem: DISCHARGE PLANNING  Goal: Discharge to home or other fac

## 2021-01-28 NOTE — PROCEDURES
Sigmoid pericolonic air and fluid collection c/w contained rupture/absess. Centered on wall. Smaller in prone position. Aspiration of air and 2ml tan purulent material, sent to lab. Attempt made but pigtail could not be coiled at site. Comp-none.

## 2021-01-28 NOTE — DIETARY NOTE
[address]     Admitting diagnosis:  Diverticulitis of large intestine with perforation without bleeding [K57.20]    Ht: 170.2 cm (5' 7\")  Wt: 83.9 kg (185 lb). Body mass index is 28.98 kg/m².   IBW: 61 kg

## 2021-01-28 NOTE — CONSULTS
Peterson Patel is a 69year old female  Patient presents with:  Abnormal Labs      REFERRED BY    Patient presents with fatigue malaise constipation and decreased appetite over the past 2 weeks.   Patient states symptoms worsening over the past few day taking: Reported on 1/27/2021 ), Disp: 10 tablet, Rfl: 0    •  Omeprazole 40 MG Oral Capsule Delayed Release, Take 1 capsule (40 mg total) by mouth daily.  (Patient not taking: Reported on 1/27/2021 ), Disp: 15 capsule, Rfl: 0    •  Solifenacin Succinate 5 thyroid/endocrine   • Cancer Brother      Social History    Tobacco Use      Smoking status: Never Smoker      Smokeless tobacco: Never Used    Alcohol use:  Yes      Alcohol/week: 0.0 standard drinks      Comment: Socially     Drug use: No      ROS     GEN Range Status   • SARS-CoV-2 (COVID-19) 01/27/2021 Not Detected  Not Detected Final    This test is intended for the qualitative detection of nucleic acid from the SARS-CoV-2 viral RNA from individuals who are suspected of COVID-19 infection by their health 8.2 g/dL Final   • Albumin 01/27/2021 2.9* 3.4 - 5.0 g/dL Final   • Globulin  01/27/2021 5.6* 2.8 - 4.4 g/dL Final   • A/G Ratio 01/27/2021 0.5* 1.0 - 2.0 Final   • FASTING 01/27/2021 No   Final   • Urine Color 01/27/2021 Yellow  Yellow Final   • Clarity U 01/27/2021 15.35* 1.50 - 7.70 x10(3) uL Final   • Lymphocyte Absolute 01/27/2021 0.87* 1.00 - 4.00 x10(3) uL Final   • Monocyte Absolute 01/27/2021 0.77  0.10 - 1.00 x10(3) uL Final   • Eosinophil Absolute 01/27/2021 0.01  0.00 - 0.70 x10(3) uL Final   • B INPATIENT  NPO  REASON FOR NO DVT/VTE PHARMACOLOGIC PROPHYLAXIS  VITAL SIGNS - NOTIFY PHYSICIAN  STRAIGHT CATH

## 2021-01-28 NOTE — PROGRESS NOTES
AB[facility] HOSPITALIST  Progress Note     Helen Lockhart Patient Status:  Inpatient    1950 MRN [id number]   [facility] 3NW-A Attending Issac Alfonso MD   Hosp Day # 1 PCP Christian Cotter MD     Chief Complaint: abd pain    S: [id number] diverticulitis with pericolonic abscess.    -Levaquin and Flagyl.    -General surgery on consult   -interventional radiology percutaneous drain. -IV fluids   -pain medication as needed. 2. GERD-we will continue on PPI.   3. Hypokalemia-we will replace p

## 2021-01-28 NOTE — ED INITIAL ASSESSMENT (HPI)
Pt c/o of exhaustion and abd cramping ongoing for two weeks.  Seen by PCP today and was told to come to ED d/t dehydration and elevated WBC

## 2021-01-28 NOTE — IMAGING NOTE
Hoa Jerry, name, date of birth and allergies verified with pt. Pt here for Ct drain pelvic drain placement, aspiration done, 2 cc. States NPO since for 2 days. Pre procedure vitals checked. IV access established to left AC from the floor.  saline

## 2021-01-28 NOTE — IMAGING NOTE
Spoke to Floor Nurse Luis Bedolla. instructed to keep pt NPO. Not on blood thinners and pt is consentable. Need a designated IV access. RAD RN will call back with time of procedure. Floor RN verbalized understanding.

## 2021-01-28 NOTE — ED PROVIDER NOTES
Patient Seen in: [facility] Emergency Department      History   Patient presents with:  Abnormal Labs    Stated Complaint: abn wbc     HPI/Subjective:   HPI    Patient is a fairly healthy 67-year-old female who reports she has felt terrible for abou \"varicella strep\"   • Wears glasses               History reviewed. No pertinent surgical history. Social History    Tobacco Use      Smoking status: Never Smoker      Smokeless tobacco: Never Used    Alcohol use:  Yes      Alcohol/week: 0. Contrast, No Oral (er)    Result Date: 1/27/2021  PROCEDURE:  CT ABDOMEN PELVIS IV CONTRAST, NO ORAL (ER)  COMPARISON:  None.   INDICATIONS:  abn wbc  TECHNIQUE:  CT scanning was performed from the dome of the diaphragm to the pubic symphysis with non-ionic change. No fracture. LUNG BASES:  Bibasilar dependent atelectatic changes. OTHER:  Moderate-sized hiatal hernia. CONCLUSION:  1. Findings concerning for acute diverticulitis with pericolonic abscess. Correlate clinically.  2. Low-attenuation

## 2021-01-28 NOTE — H&P
ABBE HOSPITALIST  History and Physical     Alex Joesph Patient Status:  Inpatient    1950 MRN [id number]   [facility] 3NW-A Attending Bernice Zamora [address] Day # 0 PCP Kasie Mac MD     Chief Complaint: Opal Lizarraga diabetes mellitus    • Screening for lipoid disorders    • Screening for malignant neoplasm of the rectum    • Screening for other and unspecified deficiency anemia    • Screening for thyroid disorder    • Special screening for malignant neoplasms, colon (six) hours as needed. (Patient not taking: Reported on 1/27/2021 ), Disp: 10 tablet, Rfl: 0    •  Omeprazole 40 MG Oral Capsule Delayed Release, Take 1 capsule (40 mg total) by mouth daily.  (Patient not taking: Reported on 1/27/2021 ), Disp: 15 capsule, R cyanosis. Integument: No rashes or lesions. Psychiatric: Appropriate mood and affect.       Diagnostic Data:      Labs:  Recent Labs   Lab 01/27/21  1451   WBC 17.2*   HGB 12.5   MCV 88.6   .0*       Recent Labs   Lab 01/27/21  1451   GLU 99   BUN

## 2021-01-28 NOTE — PLAN OF CARE
Pt a&o x4. VSS and afebrile. Pt denies calf pain, chest pain and NICK. RA, . SCDs on. NPO, denies n/v. Voids freely. Pt had one episode of loose, watery stool. Contact precautions initiated for rule out cdiff protocol. Dilaudid given for pain per MAR.  Up FALL  Goal: Free from fall injury  Description: INTERVENTIONS:  - Assess pt frequently for physical needs  - Identify cognitive and physical deficits and behaviors that affect risk of falls.   - [place] fall precautions as indicated by assessment.  - Educ

## 2021-01-28 NOTE — PROGRESS NOTES
[facility] Note:  Renal Adjustment for levofloxacin (Carolyne Leija)    Orlando Ken is a 69year old patient who has been prescribed levofloxacin (LEVAQUIN) 500 mg once .   CrCl is estimated creatinine clearance is 65.2 mL/min (based on SCr of 0.77 mg/d

## 2021-01-29 LAB
BILIRUB UR QL STRIP.AUTO: NEGATIVE
COLOR UR AUTO: YELLOW
GLUCOSE UR STRIP.AUTO-MCNC: NEGATIVE MG/DL
KETONES UR STRIP.AUTO-MCNC: 80 MG/DL
NITRITE UR QL STRIP.AUTO: NEGATIVE
PH UR STRIP.AUTO: 6 [PH] (ref 4.5–8)
POTASSIUM SERPL-SCNC: 3.7 MMOL/L (ref 3.5–5.1)
PROT UR STRIP.AUTO-MCNC: 100 MG/DL
RBC #/AREA URNS AUTO: >10 /HPF
SP GR UR STRIP.AUTO: 1.02 (ref 1–1.03)
UROBILINOGEN UR STRIP.AUTO-MCNC: <2 MG/DL

## 2021-01-29 PROCEDURE — 99232 SBSQ HOSP IP/OBS MODERATE 35: CPT | Performed by: HOSPITALIST

## 2021-01-29 PROCEDURE — 99232 SBSQ HOSP IP/OBS MODERATE 35: CPT | Performed by: SURGERY

## 2021-01-29 RX ORDER — IBUPROFEN 400 MG/1
400 TABLET ORAL EVERY 6 HOURS PRN
Status: DISCONTINUED | OUTPATIENT
Start: 2021-01-29 | End: 2021-02-01

## 2021-01-29 NOTE — PROGRESS NOTES
Received patient from radiology , alert and orient x 4 , on room air , vitals stable . telfa dressing on lt buttock intact . Denies any pain . Updated with patient and family .  Will continue to monitor

## 2021-01-29 NOTE — PROGRESS NOTES
ABBE HOSPITALIST  Progress Note     Kuldeep Sun Patient Status:  Inpatient    1950 MRN [id number]   [facility] 3NW-A Attending Trudy Paris MD   Hosp Day # 2 PCP Evelin Rae MD     Chief Complaint: abd pain    S: [id number] hours.         Imaging: Imaging data reviewed in Epic. Medications:   • levofloxacin  750 mg Intravenous Q24H   • metRONIDAZOLE  500 mg Intravenous Q8H       ASSESSMENT / PLAN:     1.  Acute diverticulitis with pericolonic abscess.    -Levaquin and Flagy

## 2021-01-29 NOTE — PLAN OF CARE
Pt is alert and oriented x4. Lungs are clear diminished on room air. Bowel sounds are active. Pt reports passing flatus and stool. NPO strict. Reported nausea. Small amount of green emesis. Antiemetic administered with relief. IVF infusing.  Old IR drain si guidelines  Outcome: Progressing     Problem: SAFETY ADULT - FALL  Goal: Free from fall injury  Description: INTERVENTIONS:  - Assess pt frequently for physical needs  - Identify cognitive and physical deficits and behaviors that affect risk of falls.   - I

## 2021-01-29 NOTE — PROGRESS NOTES
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

## 2021-01-29 NOTE — PROGRESS NOTES
Bloody mucus noted in urine. No pain upon urination. Dr. Ann Aburto notified. UA and culture ordered. Will collect next sample. LOS- pt is alert and oriented. No edema. surgical incision. LBM this morning with diarrhea. No oral issue per pt./other (specify)

## 2021-01-29 NOTE — PLAN OF CARE
Pt ambulating in [address], voiding, diet changed to clear liquids, no n/v during shift. Pt ivf per order and giving iv antibiotics. Pt at 1600 hrs had a small bloody stool, provider notified. No new orders.   Problem: Patient/Family Goals  Goal: [place] fall precautions as indicated by assessment.  - Educate pt/family on patient safety including physical limitations  - Instruct pt to call for assistance with activity based on assessment  - Modify environment to reduce risk of injury  - Provide assistive d

## 2021-01-30 LAB
BASOPHILS # BLD AUTO: 0.03 X10(3) UL (ref 0–0.2)
BASOPHILS NFR BLD AUTO: 0.5 %
DEPRECATED RDW RBC AUTO: 44.7 FL (ref 35.1–46.3)
EOSINOPHIL # BLD AUTO: 0.13 X10(3) UL (ref 0–0.7)
EOSINOPHIL NFR BLD AUTO: 2.2 %
ERYTHROCYTE [DISTWIDTH] IN BLOOD BY AUTOMATED COUNT: 13.5 % (ref 11–15)
HCT VFR BLD AUTO: 31.7 %
HGB BLD-MCNC: 10 G/DL
IMM GRANULOCYTES # BLD AUTO: 0.23 X10(3) UL (ref 0–1)
IMM GRANULOCYTES NFR BLD: 3.8 %
LYMPHOCYTES # BLD AUTO: 1.62 X10(3) UL (ref 1–4)
LYMPHOCYTES NFR BLD AUTO: 27 %
MCH RBC QN AUTO: 28.1 PG (ref 26–34)
MCHC RBC AUTO-ENTMCNC: 31.5 G/DL (ref 31–37)
MCV RBC AUTO: 89 FL
MONOCYTES # BLD AUTO: 0.39 X10(3) UL (ref 0.1–1)
MONOCYTES NFR BLD AUTO: 6.5 %
NEUTROPHILS # BLD AUTO: 3.6 X10 (3) UL (ref 1.5–7.7)
NEUTROPHILS # BLD AUTO: 3.6 X10(3) UL (ref 1.5–7.7)
NEUTROPHILS NFR BLD AUTO: 60 %
PLATELET # BLD AUTO: 420 10(3)UL (ref 150–450)
POTASSIUM SERPL-SCNC: 3.4 MMOL/L (ref 3.5–5.1)
POTASSIUM SERPL-SCNC: 3.4 MMOL/L (ref 3.5–5.1)
RBC # BLD AUTO: 3.56 X10(6)UL
WBC # BLD AUTO: 6 X10(3) UL (ref 4–11)

## 2021-01-30 PROCEDURE — 99232 SBSQ HOSP IP/OBS MODERATE 35: CPT | Performed by: HOSPITALIST

## 2021-01-30 PROCEDURE — 99232 SBSQ HOSP IP/OBS MODERATE 35: CPT | Performed by: SURGERY

## 2021-01-30 RX ORDER — HYDROCODONE BITARTRATE AND ACETAMINOPHEN 5; 325 MG/1; MG/1
2 TABLET ORAL EVERY 4 HOURS PRN
Status: DISCONTINUED | OUTPATIENT
Start: 2021-01-30 | End: 2021-02-01

## 2021-01-30 RX ORDER — FAMOTIDINE 10 MG/ML
20 INJECTION, SOLUTION INTRAVENOUS 2 TIMES DAILY
Status: DISCONTINUED | OUTPATIENT
Start: 2021-01-30 | End: 2021-01-30

## 2021-01-30 RX ORDER — KETOROLAC TROMETHAMINE 15 MG/ML
15 INJECTION, SOLUTION INTRAMUSCULAR; INTRAVENOUS EVERY 6 HOURS PRN
Status: ACTIVE | OUTPATIENT
Start: 2021-01-30 | End: 2021-02-01

## 2021-01-30 RX ORDER — HYDROCODONE BITARTRATE AND ACETAMINOPHEN 5; 325 MG/1; MG/1
1 TABLET ORAL EVERY 4 HOURS PRN
Status: DISCONTINUED | OUTPATIENT
Start: 2021-01-30 | End: 2021-02-01

## 2021-01-30 RX ORDER — FAMOTIDINE 20 MG/1
20 TABLET, FILM COATED ORAL 2 TIMES DAILY
Status: DISCONTINUED | OUTPATIENT
Start: 2021-01-30 | End: 2021-02-01

## 2021-01-30 RX ORDER — POTASSIUM CHLORIDE 20 MEQ/1
40 TABLET, EXTENDED RELEASE ORAL EVERY 4 HOURS
Status: COMPLETED | OUTPATIENT
Start: 2021-01-30 | End: 2021-01-30

## 2021-01-30 RX ORDER — POTASSIUM CHLORIDE 20 MEQ/1
40 TABLET, EXTENDED RELEASE ORAL EVERY 4 HOURS
Status: DISPENSED | OUTPATIENT
Start: 2021-01-30 | End: 2021-01-31

## 2021-01-30 RX ORDER — KETOROLAC TROMETHAMINE 30 MG/ML
30 INJECTION, SOLUTION INTRAMUSCULAR; INTRAVENOUS EVERY 6 HOURS PRN
Status: DISPENSED | OUTPATIENT
Start: 2021-01-30 | End: 2021-02-01

## 2021-01-30 NOTE — PLAN OF CARE
Pt states she is feeling \"much better\" overall, states abdominal pain has significantly decreased, tolerating FLD with no c/o nausea.  Pt states she is passing flatus, had small amount red flecks/mucus with flatus tonight, pt states no change from earlier Absence of fever/infection during anticipated neutropenic period  Description: INTERVENTIONS  - Monitor WBC  - Administer growth factors as ordered  - Implement neutropenic guidelines  Outcome: Progressing     Problem: SAFETY ADULT - FALL  Goal: Free from

## 2021-01-30 NOTE — PLAN OF CARE
Problem: Patient/Family Goals  Goal: Patient/Family Long Term Goal  Description: Patient's Long Term Goal: discharge home    Interventions:  - manage pain  - surgery consult  - tolerate diet  - See additional Care Plan goals for specific interventions  O to reduce risk of injury  - Provide assistive devices as appropriate  - Consider OT/PT consult to assist with strengthening/mobility  - Encourage toileting schedule  Outcome: Progressing     Problem: DISCHARGE PLANNING  Goal: Discharge to home or other fac with pharmacy to review patient's medication profile  Outcome: Progressing     Problem: SKIN/TISSUE INTEGRITY - ADULT  Goal: Incision(s), wounds(s) or drain site(s) healing without S/S of infection  Description: INTERVENTIONS:  - Assess and document risk f

## 2021-01-30 NOTE — PROGRESS NOTES
[facility]  Progress Note    Princess Han Patient Status:  Inpatient    1950 MRN [id number]   [facility] 3NW-A Attending Danielle Bay MD   [address] Day # 3 PCP Dung Moran MD     Subjective:   The patient is sitting comfor Hypokalemia    Diverticulitis with pelvic intramural abscess    Plan:  1. Will advance diet as tolerated to soft diet  2. Patient continues to improve at this time, will continue IV antibiotics  3. Encouraged continued ambulation  4.  Possible discharge rodriguez

## 2021-01-31 LAB — POTASSIUM SERPL-SCNC: 3.7 MMOL/L (ref 3.5–5.1)

## 2021-01-31 PROCEDURE — 99232 SBSQ HOSP IP/OBS MODERATE 35: CPT | Performed by: HOSPITALIST

## 2021-01-31 PROCEDURE — 99232 SBSQ HOSP IP/OBS MODERATE 35: CPT | Performed by: SURGERY

## 2021-01-31 RX ORDER — POLYETHYLENE GLYCOL 3350 17 G/17G
17 POWDER, FOR SOLUTION ORAL DAILY
Status: DISCONTINUED | OUTPATIENT
Start: 2021-01-31 | End: 2021-02-01

## 2021-01-31 RX ORDER — POTASSIUM CHLORIDE 20 MEQ/1
40 TABLET, EXTENDED RELEASE ORAL ONCE
Status: COMPLETED | OUTPATIENT
Start: 2021-01-31 | End: 2021-01-31

## 2021-01-31 NOTE — DISCHARGE SUMMARY
[facility] HOSPITALIST  DISCHARGE SUMMARY     Francy Cee Patient Status:  Inpatient    1950 MRN [id number]   [facility] [place] 3NW-A Attending Evans Glover MD   [facility] Day # 4 PCP Yahaira Bailon MD     Date of Admission: 2021  Date Take 100 mg by mouth daily. Refills: 0     VITAMIN B12 OR      Take 1 tablet by mouth daily. Refills: 0     vitamin C 100 MG Tabs      Take 100 mg by mouth daily. Refills: 0     ZINC 15 OR      Take by mouth.    Refills: 0            [place]

## 2021-01-31 NOTE — PLAN OF CARE
Problem: PAIN - ADULT  Goal: Verbalizes/displays adequate comfort level or patient's stated pain goal  Description: INTERVENTIONS:  - Encourage pt to monitor pain and request assistance  - Assess pain using appropriate pain scale  - Administer analgesics nonpharmacologic comfort measures as appropriate  - Advance diet as tolerated, if ordered  - Obtain nutritional consult as needed  - Evaluate fluid balance  Outcome: Progressing  Goal: Maintains or returns to baseline bowel function  Description: INTERVENT

## 2021-01-31 NOTE — PROGRESS NOTES
[facility]  Progress Note    Taj Mora Patient Status:  Inpatient    1950 MRN [id number]   [facility] 3NW-A Attending Francine Escoto MD   [address] Day # 4 PCP Regino Jones MD     Subjective:   The patient is sitting comfor of our visit was spent in counseling the patient on the above listed diagnoses and treatment options.     Lori Arias  1/31/2021  1:05 PM    Addendum:    I have seen and examined the patient; I obtained and performed the above history, physical examinat

## 2021-01-31 NOTE — PROGRESS NOTES
[facility] HOSPITALIST  Progress Note     Shiramagaly Han Patient Status:  Inpatient    1950 MRN [id number]   [facility] 3NW-A Attending Danielle Bay MD   Hosp Day # 3 PCP Dung Moran MD     Chief Complaint: abd pain    S: [id number] --    TP 8.5*  --   --   --   --        Estimated Creatinine Clearance: 80.9 mL/min (based on SCr of 0.62 mg/dL). Recent Labs   Lab 01/28/21  0905   PTP 16.5*   INR 1.29*       No results for input(s): TROP, CK in the last 168 hours.          Imaging: Im

## 2021-02-01 ENCOUNTER — PATIENT MESSAGE (OUTPATIENT)
Dept: FAMILY MEDICINE CLINIC | Facility: CLINIC | Age: 71
End: 2021-02-01

## 2021-02-01 VITALS
OXYGEN SATURATION: 97 % | BODY MASS INDEX: 29.03 KG/M2 | RESPIRATION RATE: 18 BRPM | DIASTOLIC BLOOD PRESSURE: 60 MMHG | HEART RATE: 79 BPM | SYSTOLIC BLOOD PRESSURE: 106 MMHG | WEIGHT: 185 LBS | TEMPERATURE: 98 F | HEIGHT: 67 IN

## 2021-02-01 LAB — POTASSIUM SERPL-SCNC: 4.4 MMOL/L (ref 3.5–5.1)

## 2021-02-01 PROCEDURE — 99232 SBSQ HOSP IP/OBS MODERATE 35: CPT | Performed by: SURGERY

## 2021-02-01 PROCEDURE — 99238 HOSP IP/OBS DSCHRG MGMT 30/<: CPT | Performed by: HOSPITALIST

## 2021-02-01 RX ORDER — HYDROCODONE BITARTRATE AND ACETAMINOPHEN 5; 325 MG/1; MG/1
2 TABLET ORAL EVERY 4 HOURS PRN
Qty: 20 TABLET | Refills: 0 | Status: SHIPPED | OUTPATIENT
Start: 2021-02-01 | End: 2021-02-09

## 2021-02-01 RX ORDER — METRONIDAZOLE 500 MG/1
500 TABLET ORAL 3 TIMES DAILY
Qty: 21 TABLET | Refills: 0 | Status: SHIPPED | OUTPATIENT
Start: 2021-02-01 | End: 2021-02-08

## 2021-02-01 RX ORDER — LEVOFLOXACIN 500 MG/1
500 TABLET, FILM COATED ORAL DAILY
Qty: 7 TABLET | Refills: 0 | Status: SHIPPED | OUTPATIENT
Start: 2021-02-01 | End: 2021-02-08

## 2021-02-01 NOTE — PLAN OF CARE
NURSING DISCHARGE NOTE    Discharged Home via Wheelchair. Accompanied by RN and Spouse  Belongings Taken by patient/family. A & O x4, denies pain. Room air. Tolerating soft diet, denies any nausea. Reports passing gas/belching, no bm.  Scheduled Bm guidelines  Outcome: Progressing     Problem: SAFETY ADULT - FALL  Goal: Free from fall injury  Description: INTERVENTIONS:  - Assess pt frequently for physical needs  - Identify cognitive and physical deficits and behaviors that affect risk of falls.   - I tolerated, if ordered  - Obtain nutritional consult as needed  - Evaluate fluid balance  Outcome: Progressing  Goal: Maintains or returns to baseline bowel function  Description: INTERVENTIONS:  - Assess bowel function  - Maintain adequate hydration with I

## 2021-02-01 NOTE — PROGRESS NOTES
[facility]  Progress Note    Colin Musa Patient Status:  Inpatient    1950 MRN [id number]   [facility] 3NW-A Attending Ludin Landis MD   [facility] Day # 5 PCP Ro Connolly MD     Subjective:   The patient states that she i Dr. Nany Hill to discuss outpatient colonoscopy. My total face time with this patient was 22 minutes. Greater than half of our visit was spent in counseling the patient on the above listed diagnoses and treatment options.     Marely Rosario  2/1/2021  9:42 AM

## 2021-02-01 NOTE — CM/SW NOTE
Care Progression Note:  Active Acute Medical Issue:   Diverticulitis of large intestine with perforation without bleeding   CT guided aspiration of abscess on 1/28  Continues on IVF, IV abx, tolerating soft diet  Per RN during rounds, needs to have BM prio

## 2021-02-01 NOTE — PLAN OF CARE
Pt is alert and oriented x4. Lungs are clear bilaterally on room air. Bowel sounds are active. Pt reports passing flatus. No bowel movements. Tolerating soft diet. Denies nausea. IVF infusing. Up ad dev. Voiding without difficulty. Plan of care reviewed.  A physical deficits and behaviors that affect risk of falls.   - [place] fall precautions as indicated by assessment.  - Educate pt/family on patient safety including physical limitations  - Instruct pt to call for assistance with activity based on assessme Assess bowel function  - Maintain adequate hydration with IV or PO as ordered and tolerated  - Evaluate effectiveness of GI medications  - Encourage mobilization and activity  - Obtain nutritional consult as needed  - Establish a toileting routine/schedule

## 2021-02-02 ENCOUNTER — PATIENT OUTREACH (OUTPATIENT)
Dept: CASE MANAGEMENT | Age: 71
End: 2021-02-02

## 2021-02-02 DIAGNOSIS — Z02.9 ENCOUNTERS FOR ADMINISTRATIVE PURPOSE: ICD-10-CM

## 2021-02-02 PROCEDURE — 1111F DSCHRG MED/CURRENT MED MERGE: CPT

## 2021-02-02 RX ORDER — ACETAMINOPHEN 500 MG
500 TABLET ORAL EVERY 6 HOURS PRN
Status: ON HOLD | COMMUNITY
End: 2021-04-19

## 2021-02-02 NOTE — PROGRESS NOTES
Initial Post Discharge Follow Up   Discharge Date: 2/1/21  Contact Date: 2/2/2021    Consent Verification:  Assessment Completed With: Patient  HIPAA Verified?   Yes    Discharge Dx:   Diverticulitis of large intestine with perforation without bleeding levofloxacin (LEVAQUIN) 500 MG Oral Tab Take 1 tablet (500 mg total) by mouth daily for 7 days. 7 tablet 0   • HYDROcodone-acetaminophen 5-325 MG Oral Tab Take 2 tablets by mouth every 4 (four) hours as needed.  20 tablet 0   • Naproxen Sodium 220 MG Oral T Needs post D/C:   Now that you are home, are there any needs or concerns you need addressed before your next visit with your PCP?  (DME, meds, disease concerns, Etc): No     Follow up appointments:      Your appointments     Date & Time Appointment Depar taken the Norco at night and takes Extra strength Tylenol during the day.  NCM advised to not go over 3000mg of acetaminophen a day (NCM explained Norco has 325 mg and Extra strength Tylenol has 500mg) She confirmed her appt with [facility] on 2/5/2021 and agreed

## 2021-02-02 NOTE — PAYOR COMM NOTE
--------------  DISCHARGE REVIEW    Payor: [facility] [place] #:  [id number]  Authorization Number: [id number]    Admit date: 1/27/21  Admit time:  2237  Discharge Date: 2/1/2021  1:10 PM     Admitting Physician: Lupillo Lo Refills: 0     Metoclopramide HCl 5 MG Tabs  Commonly known as: Reglan      Take 1 tablet (5 mg total) by mouth every 6 (six) hours as needed. Quantity: 10 tablet  Refills: 0     MULTIVITAMIN & MINERAL OR      Take  by mouth.    Refills: 0     Naproxen So

## 2021-02-02 NOTE — TELEPHONE ENCOUNTER
From: Juanpablo Lawton  To: Laura Palacios MD  Sent: 2/1/2021 5:02 PM CST  Subject: Prescription Question    First and most important Clara Shakila! I believe you saved my life.    I was released from the hospital today after 5days and 6 nights, with various p

## 2021-02-05 ENCOUNTER — OFFICE VISIT (OUTPATIENT)
Dept: INTERNAL MEDICINE CLINIC | Facility: CLINIC | Age: 71
End: 2021-02-05
Payer: COMMERCIAL

## 2021-02-05 VITALS
HEIGHT: 67 IN | WEIGHT: 189 LBS | BODY MASS INDEX: 29.66 KG/M2 | SYSTOLIC BLOOD PRESSURE: 112 MMHG | DIASTOLIC BLOOD PRESSURE: 68 MMHG | TEMPERATURE: 97 F | HEART RATE: 88 BPM | RESPIRATION RATE: 18 BRPM | OXYGEN SATURATION: 98 %

## 2021-02-05 DIAGNOSIS — K57.20 DIVERTICULITIS OF LARGE INTESTINE WITH PERFORATION WITHOUT BLEEDING: Primary | ICD-10-CM

## 2021-02-05 DIAGNOSIS — E87.6 HYPOKALEMIA: ICD-10-CM

## 2021-02-05 DIAGNOSIS — D72.829 LEUKOCYTOSIS, UNSPECIFIED TYPE: ICD-10-CM

## 2021-02-05 DIAGNOSIS — E01.0 THYROMEGALY: ICD-10-CM

## 2021-02-05 PROBLEM — M17.11 ARTHRITIS OF KNEE, RIGHT: Status: ACTIVE | Noted: 2021-02-05

## 2021-02-05 PROCEDURE — 3008F BODY MASS INDEX DOCD: CPT | Performed by: PHYSICIAN ASSISTANT

## 2021-02-05 PROCEDURE — 99495 TRANSJ CARE MGMT MOD F2F 14D: CPT | Performed by: PHYSICIAN ASSISTANT

## 2021-02-05 PROCEDURE — 3078F DIAST BP <80 MM HG: CPT | Performed by: PHYSICIAN ASSISTANT

## 2021-02-05 PROCEDURE — 3074F SYST BP LT 130 MM HG: CPT | Performed by: PHYSICIAN ASSISTANT

## 2021-02-05 NOTE — PROGRESS NOTES
TRANSITIONAL CARE CLINIC PHARMACIST MEDICATION RECONCILIATION        Francy Cee MRN [id number]    1950 PCP Yahaira Bailon MD       Comments: Medication history completed by the [address] [place] [address] Pharmacist with the patient in the alcohol with the Metronidazole. Discussed finishing the full therapy and not stop early. Discussed the Metoclopramide and Omeprazole prescriptions that the patient received from her PCP a few hours before admission.   Patient reports she is not having

## 2021-02-05 NOTE — DISCHARGE SUMMARY
Robert Bermudez MD   Physician   Hospitalist   Discharge Summary      Signed   Date of Service:  1/31/2021  6:49 AM               Signed             Show:Clear all  [x]Manual[x]Template[]Copied    Added by:  Betty Harry MD    []Santosh for details  Rashard Montoya Quantity: 15 capsule  Refills: 0      Solifenacin Succinate 5 MG Tabs  Commonly known as: VESICARE       Take 1 tablet (5 mg total) by mouth daily.    Quantity: 30 tablet  Refills: 1      Turmeric 450 MG Caps       Take  by mouth.    Refills: 0      Vitami

## 2021-02-05 NOTE — PATIENT INSTRUCTIONS
Future Appointments   Date Time Provider Jaylon Zenia   2/9/2021 11:30 AM Kirby Murray DO [facility]   2/12/2021  1:20 PM Cesario Babinski, MD [facility]     1.  Dr. Lucia Bower scheduled for Feb. 19@ 2:15pm       [id number] [address]

## 2021-02-05 NOTE — PROGRESS NOTES
[facility] VISIT  [address] [facility]IC      HISTORY   CHIEF COMPLAINT: diverticulitis    HPI: Taj Mora is a 69year old female here today for hospital follow up for diverticulitis with pericolonic abscess.   Taj Mora was tablet (500 mg total) by mouth daily for 7 days. , Disp: 7 tablet, Rfl: 0    •  HYDROcodone-acetaminophen 5-325 MG Oral Tab, Take 2 tablets by mouth every 4 (four) hours as needed. , Disp: 20 tablet, Rfl: 0    •  Naproxen Sodium 220 MG Oral Tab, Take 220 mg delivery) 1972, 1974    x2   • Osteopenia     quit fosamax after 1 year   • Pain in joint, multiple sites    • Polydipsia    • Pure hypercholesterolemia    • Routine general medical examination at a health care facility    • Routine gynecological examinati Incl, First Lesion(cpt=10009)    Result Date: 1/28/2021  CONCLUSION:  CT-guided aspiration of a small air and fluid containing pericolonic collection centered at the distal sigmoid wall.   Approximately 2 mL of tan purulent material was placed in a sterile ROS:  Positives: Left lower quadrant abdominal pain, loose stools, right knee pain, right hand limitation due to mass  Negatives: denies fever, chills, weakness, sore throat, dysphagia, cough, CASAS, wheezing, syncope, chest pain, fatigue, palpitations this Visit:    •  acetaminophen 500 MG Oral Tab, Take 500 mg by mouth every 6 (six) hours as needed for Pain., Disp: , Rfl:     •  metRONIDAZOLE 500 MG Oral Tab, Take 1 tablet (500 mg total) by mouth 3 (three) times daily for 7 days. , Disp: 21 tablet, Rfl: changes to current doses of medications and discontinued medications.     I certify that the following are true:  Communication with the patient was made within 2 business days of discharge on date above   Medical Decision Making- Based on service period of

## 2021-02-09 ENCOUNTER — OFFICE VISIT (OUTPATIENT)
Dept: SURGERY | Facility: CLINIC | Age: 71
End: 2021-02-09
Payer: COMMERCIAL

## 2021-02-09 VITALS — WEIGHT: 175 LBS | BODY MASS INDEX: 27.47 KG/M2 | HEART RATE: 88 BPM | TEMPERATURE: 98 F | HEIGHT: 67 IN

## 2021-02-09 DIAGNOSIS — K57.92 DIVERTICULITIS: Primary | ICD-10-CM

## 2021-02-09 PROCEDURE — 3008F BODY MASS INDEX DOCD: CPT | Performed by: SURGERY

## 2021-02-09 PROCEDURE — 99214 OFFICE O/P EST MOD 30 MIN: CPT | Performed by: SURGERY

## 2021-02-09 RX ORDER — NEOMYCIN SULFATE 500 MG/1
TABLET ORAL
Qty: 6 TABLET | Refills: 0 | Status: ON HOLD | OUTPATIENT
Start: 2021-02-09 | End: 2021-04-19

## 2021-02-09 RX ORDER — METRONIDAZOLE 500 MG/1
TABLET ORAL
Qty: 3 TABLET | Refills: 0 | Status: ON HOLD | OUTPATIENT
Start: 2021-02-09 | End: 2021-04-19

## 2021-02-09 RX ORDER — POLYETHYLENE GLYCOL 3350, SODIUM CHLORIDE, SODIUM BICARBONATE, POTASSIUM CHLORIDE 420; 11.2; 5.72; 1.48 G/4L; G/4L; G/4L; G/4L
POWDER, FOR SOLUTION ORAL
Qty: 1 BOTTLE | Refills: 1 | Status: CANCELLED | OUTPATIENT
Start: 2021-02-09

## 2021-02-09 RX ORDER — SODIUM, POTASSIUM,MAG SULFATES 17.5-3.13G
SOLUTION, RECONSTITUTED, ORAL ORAL
Qty: 1 KIT | Refills: 1 | Status: ON HOLD | OUTPATIENT
Start: 2021-02-09 | End: 2021-04-19

## 2021-02-09 NOTE — PATIENT INSTRUCTIONS
During this visit, the Enhanced Recovery after Intestinal Surgery (ERAS) Patient Guide was discussed with Mandy Larios.  She was provided a copy of the guide to review at home, which includes education on the strategy, pre-op, intra-op and what to expect during

## 2021-02-11 ENCOUNTER — TELEPHONE (OUTPATIENT)
Dept: SURGERY | Facility: CLINIC | Age: 71
End: 2021-02-11

## 2021-02-11 DIAGNOSIS — K57.32 DIVERTICULITIS OF LARGE INTESTINE, UNSPECIFIED BLEEDING STATUS, UNSPECIFIED COMPLICATION STATUS: Primary | ICD-10-CM

## 2021-02-12 ENCOUNTER — OFFICE VISIT (OUTPATIENT)
Dept: FAMILY MEDICINE CLINIC | Facility: CLINIC | Age: 71
End: 2021-02-12
Payer: COMMERCIAL

## 2021-02-12 VITALS
OXYGEN SATURATION: 98 % | BODY MASS INDEX: 29.19 KG/M2 | HEART RATE: 105 BPM | WEIGHT: 186 LBS | TEMPERATURE: 99 F | HEIGHT: 67 IN | SYSTOLIC BLOOD PRESSURE: 102 MMHG | DIASTOLIC BLOOD PRESSURE: 68 MMHG | RESPIRATION RATE: 16 BRPM

## 2021-02-12 DIAGNOSIS — N73.9 PELVIC ABSCESS IN FEMALE: ICD-10-CM

## 2021-02-12 DIAGNOSIS — K57.92 ACUTE DIVERTICULITIS: ICD-10-CM

## 2021-02-12 DIAGNOSIS — R93.89 ABNORMAL FINDING PRESENT ON DIAGNOSTIC IMAGING OF UTERUS: Primary | ICD-10-CM

## 2021-02-12 DIAGNOSIS — E04.9 GOITER: ICD-10-CM

## 2021-02-12 PROCEDURE — 3078F DIAST BP <80 MM HG: CPT | Performed by: FAMILY MEDICINE

## 2021-02-12 PROCEDURE — 3074F SYST BP LT 130 MM HG: CPT | Performed by: FAMILY MEDICINE

## 2021-02-12 PROCEDURE — 1111F DSCHRG MED/CURRENT MED MERGE: CPT | Performed by: FAMILY MEDICINE

## 2021-02-12 PROCEDURE — 3008F BODY MASS INDEX DOCD: CPT | Performed by: FAMILY MEDICINE

## 2021-02-12 PROCEDURE — 99214 OFFICE O/P EST MOD 30 MIN: CPT | Performed by: FAMILY MEDICINE

## 2021-02-12 NOTE — PROGRESS NOTES
HPI:    Hubert Suarez is a 69year old female here today for hospital follow up.    She was discharged from Inpatient hospital, [facility] to Home   Admission Date: 1/27/21   Discharge Date: 2/1/21  Hospital Discharge Diagnoses (since 1/13/2021) Covid vaccine. Having diarrhea. No fiber, soft foods. Colonoscopy in 4-6 weeks. HA feels warm lower abd    Colonoscopy 3/29    Has surgery scheduled 4/19. Not regular exercise last 2 years or so. Allergies:  She is allergic to bee venom.     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 \"  HEMATOLOGIC: denies hx of anemia, or bruising, ENDOCRINE: was told to follow up on thyroid by hospitalist.   ALL/ASTHMA: denies hx of allergy or asthma      PHYSICAL EXAM:   No LMP recorded.  Patient is postmenopausal.  Dennise days of discharge on date above   Medical Decision Making- Based on service period of discharge to 30 days:   · Number of Possible Diagnoses and/or Management Options: moderate  · Amount and/or Complexity of Data to Be Reviewed: moderate  · Risk of Signifi

## 2021-02-12 NOTE — PATIENT INSTRUCTIONS
See Dr. Nany Hill as scheduled for colonoscopy. Ultrasound pelvic & thyroid about a week after colonoscopy. See me for preop if required by anesthesia.      Otherwise afterward as needed, I'll be off     If gyne needed: can see Dr. Abigail Mary or associat

## 2021-02-15 NOTE — H&P
Phu Mims is a 69year old female  Patient presents with:  Diverticulitis: Follow up from hospital for diverticulitis. Finished antibiotics. c/o abdominal tenderness.        REFERRED BY    Patient presents status post inpatient hospitalization for capsule (40 mg total) by mouth daily. , Disp: 15 capsule, Rfl: 0    •  Multiple Vitamins-Minerals (MULTIVITAMIN & MINERAL OR), Take 1 tablet by mouth daily. , Disp: , Rfl:     •  Ascorbic Acid (VITAMIN C) 100 MG Oral Tab, Take 100 mg by mouth daily. , Disp: Comment: Socially     Drug use: No      ROS     GENERAL HEALTH: otherwise feels well, no weight loss, no fever or chills  SKIN: denies any unusual skin rashes or jaundice  HEENT: denies nasal congestion, sinus pain or sore throat; hearing loss negative, viral RNA from individuals who are suspected of COVID-19 infection by their healthcare provider.               A \"Detected\" result is considered a positive test result for COVID-19.    A \"Not Detected\" result for this test means that SARS-CoV-2 RNA was 13.6  11.0 - 15.0 % Final   • RDW-SD 01/28/2021 45.0  35.1 - 46.3 fL Final   • Neutrophil Absolute Prelim 01/28/2021 12.52* 1.50 - 7.70 x10 (3) uL Final   • Neutrophil Absolute 01/28/2021 12.52* 1.50 - 7.70 x10(3) uL Final   • Lymphocyte Absolute 01/28/202 01/29/2021 21-50* <5 /HPF Final   • RBC URINE 01/29/2021 >10* 0 - 2 /HPF Final   • Bacteria Urine 01/29/2021 1+* None Seen Final   • Squamous Epi.  Cells 01/29/2021 None Seen  Small /LPF Final   • Renal Tubular Epithelial Cells 01/29/2021 None Seen  Small / ASSESSMENT   Imp: Sigmoid diverticulitis with intramural abscess.   Last colonoscopy greater than 5 years ago  PLan: Colonoscopy followed by laparoscopic possible open low anterior colon resection with proctoscopy coloanastomosis discussed with patien

## 2021-03-09 RX ORDER — SODIUM CHLORIDE, SODIUM LACTATE, POTASSIUM CHLORIDE, CALCIUM CHLORIDE 600; 310; 30; 20 MG/100ML; MG/100ML; MG/100ML; MG/100ML
INJECTION, SOLUTION INTRAVENOUS CONTINUOUS
Status: CANCELLED | OUTPATIENT
Start: 2021-03-09

## 2021-03-09 RX ORDER — SODIUM CHLORIDE 9 MG/ML
INJECTION, SOLUTION INTRAVENOUS CONTINUOUS
Status: CANCELLED | OUTPATIENT
Start: 2021-03-09

## 2021-03-09 RX ORDER — GABAPENTIN 100 MG/1
200 CAPSULE ORAL ONCE
Status: CANCELLED | OUTPATIENT
Start: 2021-03-09 | End: 2021-03-09

## 2021-03-09 RX ORDER — HEPARIN SODIUM 5000 [USP'U]/ML
5000 INJECTION, SOLUTION INTRAVENOUS; SUBCUTANEOUS ONCE
Status: CANCELLED | OUTPATIENT
Start: 2021-03-09 | End: 2021-03-09

## 2021-03-09 RX ORDER — ACETAMINOPHEN 500 MG
1000 TABLET ORAL ONCE
Status: CANCELLED | OUTPATIENT
Start: 2021-03-09 | End: 2021-03-09

## 2021-03-09 RX ORDER — METRONIDAZOLE 500 MG/100ML
500 INJECTION, SOLUTION INTRAVENOUS ONCE
Status: CANCELLED | OUTPATIENT
Start: 2021-03-09 | End: 2021-03-09

## 2021-03-26 ENCOUNTER — LAB ENCOUNTER (OUTPATIENT)
Dept: LAB | Facility: HOSPITAL | Age: 71
End: 2021-03-26
Attending: SURGERY
Payer: MEDICARE

## 2021-03-26 DIAGNOSIS — K57.92 DIVERTICULITIS: ICD-10-CM

## 2021-03-26 LAB — SARS-COV-2 RNA RESP QL NAA+PROBE: NOT DETECTED

## 2021-03-29 ENCOUNTER — ANESTHESIA EVENT (OUTPATIENT)
Dept: ENDOSCOPY | Facility: HOSPITAL | Age: 71
End: 2021-03-29
Payer: MEDICARE

## 2021-03-29 ENCOUNTER — HOSPITAL ENCOUNTER (OUTPATIENT)
Facility: HOSPITAL | Age: 71
Setting detail: HOSPITAL OUTPATIENT SURGERY
Discharge: HOME | End: 2021-03-29
Attending: SURGERY | Admitting: SURGERY
Payer: MEDICARE

## 2021-03-29 ENCOUNTER — ANESTHESIA (OUTPATIENT)
Dept: ENDOSCOPY | Facility: HOSPITAL | Age: 71
End: 2021-03-29
Payer: MEDICARE

## 2021-03-29 VITALS
HEART RATE: 82 BPM | HEIGHT: 67 IN | OXYGEN SATURATION: 98 % | WEIGHT: 180 LBS | RESPIRATION RATE: 18 BRPM | TEMPERATURE: 98 F | DIASTOLIC BLOOD PRESSURE: 68 MMHG | SYSTOLIC BLOOD PRESSURE: 103 MMHG | BODY MASS INDEX: 28.25 KG/M2

## 2021-03-29 DIAGNOSIS — K57.92 DIVERTICULITIS: Primary | ICD-10-CM

## 2021-03-29 PROCEDURE — 0DJD8ZZ INSPECTION OF LOWER INTESTINAL TRACT, VIA NATURAL OR ARTIFICIAL OPENING ENDOSCOPIC: ICD-10-PCS | Performed by: SURGERY

## 2021-03-29 RX ORDER — SODIUM CHLORIDE, SODIUM LACTATE, POTASSIUM CHLORIDE, CALCIUM CHLORIDE 600; 310; 30; 20 MG/100ML; MG/100ML; MG/100ML; MG/100ML
INJECTION, SOLUTION INTRAVENOUS CONTINUOUS
Status: DISCONTINUED | OUTPATIENT
Start: 2021-03-29 | End: 2021-03-29

## 2021-03-29 RX ORDER — LIDOCAINE HYDROCHLORIDE 10 MG/ML
INJECTION, SOLUTION EPIDURAL; INFILTRATION; INTRACAUDAL; PERINEURAL AS NEEDED
Status: DISCONTINUED | OUTPATIENT
Start: 2021-03-29 | End: 2021-03-29 | Stop reason: SURG

## 2021-03-29 RX ORDER — SODIUM CHLORIDE, SODIUM LACTATE, POTASSIUM CHLORIDE, CALCIUM CHLORIDE 600; 310; 30; 20 MG/100ML; MG/100ML; MG/100ML; MG/100ML
INJECTION, SOLUTION INTRAVENOUS CONTINUOUS
Status: CANCELLED | OUTPATIENT
Start: 2021-03-29

## 2021-03-29 RX ORDER — ONDANSETRON 2 MG/ML
4 INJECTION INTRAMUSCULAR; INTRAVENOUS AS NEEDED
Status: CANCELLED | OUTPATIENT
Start: 2021-03-29 | End: 2021-03-29

## 2021-03-29 RX ORDER — NALOXONE HYDROCHLORIDE 0.4 MG/ML
80 INJECTION, SOLUTION INTRAMUSCULAR; INTRAVENOUS; SUBCUTANEOUS AS NEEDED
Status: CANCELLED | OUTPATIENT
Start: 2021-03-29 | End: 2021-03-29

## 2021-03-29 RX ADMIN — LIDOCAINE HYDROCHLORIDE 10 MG: 10 INJECTION, SOLUTION EPIDURAL; INFILTRATION; INTRACAUDAL; PERINEURAL at 12:50:00

## 2021-03-29 NOTE — H&P
Patient presents status post inpatient hospitalization for diverticulitis. This was the patient's first attack however she had a intramural abscess. This was not amenable to drainage.  Patient did resolve her infection with IV antibiotics and currently feel Tab, Take 100 mg by mouth daily. , Disp: , Rfl:   • Zinc Sulfate (ZINC 15 OR), Take 1 tablet by mouth daily. , Disp: , Rfl:   • Cyanocobalamin (VITAMIN B12 OR), Take 1 tablet by mouth daily. , Disp: , Rfl:   • Vitamin B-1 (VITAMIN B1) 100 MG Oral Tab, Take 1 denies shortness of breath, wheezing or cough   CARDIOVASCULAR: denies chest pain or CASAS; no palpitations   GI: denies nausea, vomiting, constipation, diarrhea; no rectal bleeding; no heartburn   GENITAL/: no dysuria, urgency or frequency, no tea colored the assay. Test performed using the Abbott ID NOW COVID-19 assay performed on the ID NOW Instrument, [facility].; Stacy Valenzuela 26.    This test is being used under the Food and Drug Administration's Emergency Use Connalexandro Mcgrath 01/28/2021 1.01* 0.10 - 1.00 x10(3) uL Final   • Eosinophil Absolute 01/28/2021 0.05  0.00 - 0.70 x10(3) uL Final   • Basophil Absolute 01/28/2021 0.03  0.00 - 0.20 x10(3) uL Final   • Immature Granulocyte Absolute 01/28/2021 0.14  0.00 - 1.00 x10(3) uL Fi Final   • Yeast Urine 01/29/2021 None Seen  None Seen Final   • Urine Culture 01/29/2021 No Growth at 18-24 hrs.    Final   • Potassium 01/30/2021 3.4* 3.5 - 5.1 mmol/L Final   • WBC 01/30/2021 6.0  4.0 - 11.0 x10(3) uL Final   • RBC 01/30/2021 3.56* 3.80 - ureteral injury and anastomotic leakage.

## 2021-03-29 NOTE — OPERATIVE REPORT
[facility]    PATIENT'S NAME: Suzanne Wang DEDE   ATTENDING PHYSICIAN: Mimi Duncan D.O.   OPERATING PHYSICIAN: Mimi Duncan D.O.   PATIENT ACCOUNT#:   [de-identified]    LOCATION:  [facility] ENDO POOL ROOMS 8 EDWP  MEDICAL RECORD #:   [id number] The patient tolerated the procedure well.     Dictated By Dave Macdonald D.O.  d: 03/29/2021 14:17:30  t: 03/29/2021 14:24:04  Psychiatric [id number]  /    cc: Akash Tom D.O.

## 2021-03-29 NOTE — ANESTHESIA POSTPROCEDURE EVALUATION
[place] [address] Patient Status:  Hospital Outpatient Surgery   Age/Gender 69year old female MRN [id number]   Location [address] [facility]. Attending Graeme Cotter, [address] Day # 0 PCP Jenny Mathew MD       Anesthesia Pos

## 2021-03-29 NOTE — ANESTHESIA PREPROCEDURE EVALUATION
PRE-OP EVALUATION    Patient Name: Lio Godinez    Admit Diagnosis: Diverticulitis [K57.92]    Pre-op Diagnosis: Diverticulitis [K57.92]    COLONOSCOPY    Anesthesia Procedure: COLONOSCOPY (N/A )    Surgeon(s) and Role:     Brennen Ellison DO - Pr MG Oral Cap, Take 1 capsule by mouth daily. , Disp: , Rfl: , More than a month at Unknown time        Allergies: Bee Venom      Anesthesia Evaluation    Patient summary reviewed.     Anesthetic Complications  (-) history of anesthetic complications

## 2021-04-05 ENCOUNTER — HOSPITAL ENCOUNTER (OUTPATIENT)
Dept: ULTRASOUND IMAGING | Facility: HOSPITAL | Age: 71
Discharge: HOME | End: 2021-04-05
Attending: FAMILY MEDICINE
Payer: MEDICARE

## 2021-04-05 ENCOUNTER — LABORATORY ENCOUNTER (OUTPATIENT)
Dept: LAB | Facility: HOSPITAL | Age: 71
End: 2021-04-05
Payer: MEDICARE

## 2021-04-05 ENCOUNTER — APPOINTMENT (OUTPATIENT)
Dept: LAB | Facility: HOSPITAL | Age: 71
End: 2021-04-05
Attending: FAMILY MEDICINE
Payer: MEDICARE

## 2021-04-05 DIAGNOSIS — E04.9 GOITER: ICD-10-CM

## 2021-04-05 DIAGNOSIS — R93.89 ABNORMAL FINDING PRESENT ON DIAGNOSTIC IMAGING OF UTERUS: ICD-10-CM

## 2021-04-05 DIAGNOSIS — K57.92 DIVERTICULITIS: ICD-10-CM

## 2021-04-05 PROCEDURE — 76856 US EXAM PELVIC COMPLETE: CPT | Performed by: FAMILY MEDICINE

## 2021-04-05 PROCEDURE — 85027 COMPLETE CBC AUTOMATED: CPT

## 2021-04-05 PROCEDURE — 36415 COLL VENOUS BLD VENIPUNCTURE: CPT

## 2021-04-05 PROCEDURE — 84443 ASSAY THYROID STIM HORMONE: CPT

## 2021-04-05 PROCEDURE — 76536 US EXAM OF HEAD AND NECK: CPT | Performed by: FAMILY MEDICINE

## 2021-04-05 PROCEDURE — 80048 BASIC METABOLIC PNL TOTAL CA: CPT

## 2021-04-08 ENCOUNTER — OFFICE VISIT (OUTPATIENT)
Dept: OBGYN CLINIC | Facility: CLINIC | Age: 71
End: 2021-04-08
Payer: COMMERCIAL

## 2021-04-08 VITALS
SYSTOLIC BLOOD PRESSURE: 112 MMHG | DIASTOLIC BLOOD PRESSURE: 62 MMHG | HEIGHT: 67 IN | WEIGHT: 179.38 LBS | HEART RATE: 84 BPM | BODY MASS INDEX: 28.16 KG/M2

## 2021-04-08 DIAGNOSIS — R93.89 THICKENED ENDOMETRIUM: Primary | ICD-10-CM

## 2021-04-08 PROCEDURE — 99203 OFFICE O/P NEW LOW 30 MIN: CPT | Performed by: OBSTETRICS & GYNECOLOGY

## 2021-04-08 PROCEDURE — 3008F BODY MASS INDEX DOCD: CPT | Performed by: OBSTETRICS & GYNECOLOGY

## 2021-04-08 PROCEDURE — 3074F SYST BP LT 130 MM HG: CPT | Performed by: OBSTETRICS & GYNECOLOGY

## 2021-04-08 PROCEDURE — 88305 TISSUE EXAM BY PATHOLOGIST: CPT | Performed by: OBSTETRICS & GYNECOLOGY

## 2021-04-08 PROCEDURE — 58100 BIOPSY OF UTERUS LINING: CPT | Performed by: OBSTETRICS & GYNECOLOGY

## 2021-04-08 PROCEDURE — 88342 IMHCHEM/IMCYTCHM 1ST ANTB: CPT | Performed by: OBSTETRICS & GYNECOLOGY

## 2021-04-08 PROCEDURE — 3078F DIAST BP <80 MM HG: CPT | Performed by: OBSTETRICS & GYNECOLOGY

## 2021-04-09 PROBLEM — R93.89 THICKENED ENDOMETRIUM: Status: ACTIVE | Noted: 2021-04-09

## 2021-04-09 NOTE — ASSESSMENT & PLAN NOTE
No vaginal bleeding  Reviewed with patient the significance of thickened endometrium in postmenopausal women  Possible etiologies reviewed  The goal is to rule out hyperplasia/malignancy  EMB vs. D&C reviewed  I suggested EMB today as patient was tearful a

## 2021-04-09 NOTE — PROGRESS NOTES
OB/GYN H&P       CC: Patient presents with:  Consult: review pelvic U/S done 21      HPI: Lio Godinez is a 69year old  here for consultation regarding thickened endometrium found on the CT scan follow-up pelvic ultrasound.   She was referre deficiency anemia    • Screening for thyroid disorder    • Special screening for malignant neoplasms, colon    • Urinary tract infection, site not specified    • Varicella     \"varicella strep\"   • Visual impairment     glasses   • Wears glasses      Pas Tobacco Use      Smoking status: Never Smoker      Smokeless tobacco: Never Used    Vaping Use      Vaping Use: Never used    Alcohol use: Not Currently    Drug use: No      ROS:   A comprehensive 10 point review of systems was completed.   Pertinent positi

## 2021-04-09 NOTE — PROCEDURES
Procedure: Endometrial biopsy     Date of Procedure: 21      Indications:   69year old female  who presents for endometrial biopsy 2/2 thickened endometrium    Procedure details:   The procedure, risks, benefits and alternatives were discussed wi

## 2021-04-16 ENCOUNTER — LAB ENCOUNTER (OUTPATIENT)
Dept: LAB | Facility: HOSPITAL | Age: 71
DRG: 331 | End: 2021-04-16
Attending: SURGERY
Payer: MEDICARE

## 2021-04-16 DIAGNOSIS — K57.92 DIVERTICULITIS: ICD-10-CM

## 2021-04-18 ENCOUNTER — ANESTHESIA EVENT (OUTPATIENT)
Dept: SURGERY | Facility: HOSPITAL | Age: 71
DRG: 331 | End: 2021-04-18
Payer: MEDICARE

## 2021-04-19 ENCOUNTER — ANESTHESIA (OUTPATIENT)
Dept: SURGERY | Facility: HOSPITAL | Age: 71
DRG: 331 | End: 2021-04-19
Payer: MEDICARE

## 2021-04-19 ENCOUNTER — HOSPITAL ENCOUNTER (INPATIENT)
Facility: HOSPITAL | Age: 71
LOS: 4 days | Discharge: HOME | DRG: 331 | End: 2021-04-23
Attending: SURGERY | Admitting: SURGERY
Payer: MEDICARE

## 2021-04-19 DIAGNOSIS — K57.92 DIVERTICULITIS: Primary | ICD-10-CM

## 2021-04-19 PROCEDURE — 0DJD4ZZ INSPECTION OF LOWER INTESTINAL TRACT, PERCUTANEOUS ENDOSCOPIC APPROACH: ICD-10-PCS | Performed by: SURGERY

## 2021-04-19 PROCEDURE — 0DNN4ZZ RELEASE SIGMOID COLON, PERCUTANEOUS ENDOSCOPIC APPROACH: ICD-10-PCS | Performed by: SURGERY

## 2021-04-19 PROCEDURE — 76942 ECHO GUIDE FOR BIOPSY: CPT | Performed by: ANESTHESIOLOGY

## 2021-04-19 PROCEDURE — S0030 INJECTION, METRONIDAZOLE: HCPCS

## 2021-04-19 PROCEDURE — 0DBN0ZZ EXCISION OF SIGMOID COLON, OPEN APPROACH: ICD-10-PCS | Performed by: SURGERY

## 2021-04-19 PROCEDURE — 88307 TISSUE EXAM BY PATHOLOGIST: CPT | Performed by: SURGERY

## 2021-04-19 PROCEDURE — 82962 GLUCOSE BLOOD TEST: CPT

## 2021-04-19 RX ORDER — FAMOTIDINE 20 MG/1
20 TABLET ORAL 2 TIMES DAILY
Status: DISCONTINUED | OUTPATIENT
Start: 2021-04-19 | End: 2021-04-23

## 2021-04-19 RX ORDER — SODIUM CHLORIDE 9 MG/ML
INJECTION, SOLUTION INTRAVENOUS CONTINUOUS
Status: DISCONTINUED | OUTPATIENT
Start: 2021-04-19 | End: 2021-04-19

## 2021-04-19 RX ORDER — MEPERIDINE HYDROCHLORIDE 25 MG/ML
12.5 INJECTION INTRAMUSCULAR; INTRAVENOUS; SUBCUTANEOUS AS NEEDED
Status: DISCONTINUED | OUTPATIENT
Start: 2021-04-19 | End: 2021-04-19 | Stop reason: HOSPADM

## 2021-04-19 RX ORDER — MINERAL OIL
OIL (ML) MISCELLANEOUS AS NEEDED
Status: DISCONTINUED | OUTPATIENT
Start: 2021-04-19 | End: 2021-04-19 | Stop reason: HOSPADM

## 2021-04-19 RX ORDER — DOCUSATE SODIUM 100 MG/1
100 CAPSULE, LIQUID FILLED ORAL 2 TIMES DAILY
Status: DISCONTINUED | OUTPATIENT
Start: 2021-04-19 | End: 2021-04-23

## 2021-04-19 RX ORDER — SODIUM CHLORIDE 9 MG/ML
60 INJECTION, SOLUTION INTRAVENOUS CONTINUOUS
Status: DISCONTINUED | OUTPATIENT
Start: 2021-04-19 | End: 2021-04-23

## 2021-04-19 RX ORDER — HEPARIN SODIUM 5000 [USP'U]/ML
5000 INJECTION, SOLUTION INTRAVENOUS; SUBCUTANEOUS ONCE
Status: COMPLETED | OUTPATIENT
Start: 2021-04-19 | End: 2021-04-19

## 2021-04-19 RX ORDER — KETOROLAC TROMETHAMINE 15 MG/ML
15 INJECTION, SOLUTION INTRAMUSCULAR; INTRAVENOUS EVERY 6 HOURS
Status: COMPLETED | OUTPATIENT
Start: 2021-04-19 | End: 2021-04-20

## 2021-04-19 RX ORDER — METRONIDAZOLE 500 MG/100ML
500 INJECTION, SOLUTION INTRAVENOUS ONCE
Status: COMPLETED | OUTPATIENT
Start: 2021-04-19 | End: 2021-04-19

## 2021-04-19 RX ORDER — BUPIVACAINE HYDROCHLORIDE 2.5 MG/ML
INJECTION, SOLUTION EPIDURAL; INFILTRATION; INTRACAUDAL AS NEEDED
Status: DISCONTINUED | OUTPATIENT
Start: 2021-04-19 | End: 2021-04-19 | Stop reason: SURG

## 2021-04-19 RX ORDER — OXYCODONE HYDROCHLORIDE 15 MG/1
15 TABLET ORAL EVERY 4 HOURS PRN
Status: DISCONTINUED | OUTPATIENT
Start: 2021-04-19 | End: 2021-04-23

## 2021-04-19 RX ORDER — ACETAMINOPHEN 500 MG
1000 TABLET ORAL EVERY 8 HOURS SCHEDULED
Status: DISCONTINUED | OUTPATIENT
Start: 2021-04-19 | End: 2021-04-23

## 2021-04-19 RX ORDER — NALOXONE HYDROCHLORIDE 0.4 MG/ML
80 INJECTION, SOLUTION INTRAMUSCULAR; INTRAVENOUS; SUBCUTANEOUS AS NEEDED
Status: DISCONTINUED | OUTPATIENT
Start: 2021-04-19 | End: 2021-04-19 | Stop reason: HOSPADM

## 2021-04-19 RX ORDER — HYDROMORPHONE HYDROCHLORIDE 1 MG/ML
0.4 INJECTION, SOLUTION INTRAMUSCULAR; INTRAVENOUS; SUBCUTANEOUS EVERY 5 MIN PRN
Status: DISCONTINUED | OUTPATIENT
Start: 2021-04-19 | End: 2021-04-19 | Stop reason: HOSPADM

## 2021-04-19 RX ORDER — ACETAMINOPHEN 500 MG
1000 TABLET ORAL ONCE
Status: DISCONTINUED | OUTPATIENT
Start: 2021-04-19 | End: 2021-04-19 | Stop reason: ALTCHOICE

## 2021-04-19 RX ORDER — HYDROMORPHONE HYDROCHLORIDE 1 MG/ML
0.2 INJECTION, SOLUTION INTRAMUSCULAR; INTRAVENOUS; SUBCUTANEOUS EVERY 2 HOUR PRN
Status: DISCONTINUED | OUTPATIENT
Start: 2021-04-19 | End: 2021-04-23

## 2021-04-19 RX ORDER — KETOROLAC TROMETHAMINE 30 MG/ML
INJECTION, SOLUTION INTRAMUSCULAR; INTRAVENOUS AS NEEDED
Status: DISCONTINUED | OUTPATIENT
Start: 2021-04-19 | End: 2021-04-19 | Stop reason: SURG

## 2021-04-19 RX ORDER — LIDOCAINE HYDROCHLORIDE ANHYDROUS AND DEXTROSE MONOHYDRATE .8; 5 G/100ML; G/100ML
INJECTION, SOLUTION INTRAVENOUS CONTINUOUS PRN
Status: DISCONTINUED | OUTPATIENT
Start: 2021-04-19 | End: 2021-04-19 | Stop reason: SURG

## 2021-04-19 RX ORDER — POLYETHYLENE GLYCOL 3350 17 G/17G
17 POWDER, FOR SOLUTION ORAL DAILY PRN
Status: DISCONTINUED | OUTPATIENT
Start: 2021-04-19 | End: 2021-04-23

## 2021-04-19 RX ORDER — LIDOCAINE HYDROCHLORIDE 10 MG/ML
INJECTION, SOLUTION EPIDURAL; INFILTRATION; INTRACAUDAL; PERINEURAL AS NEEDED
Status: DISCONTINUED | OUTPATIENT
Start: 2021-04-19 | End: 2021-04-19 | Stop reason: SURG

## 2021-04-19 RX ORDER — MIDAZOLAM HYDROCHLORIDE 1 MG/ML
1 INJECTION INTRAMUSCULAR; INTRAVENOUS EVERY 5 MIN PRN
Status: DISCONTINUED | OUTPATIENT
Start: 2021-04-19 | End: 2021-04-19 | Stop reason: HOSPADM

## 2021-04-19 RX ORDER — CEFTRIAXONE 2 G/1
INJECTION, POWDER, FOR SOLUTION INTRAMUSCULAR; INTRAVENOUS
Status: DISPENSED
Start: 2021-04-19 | End: 2021-04-19

## 2021-04-19 RX ORDER — HEPARIN SODIUM 5000 [USP'U]/ML
5000 INJECTION, SOLUTION INTRAVENOUS; SUBCUTANEOUS EVERY 8 HOURS SCHEDULED
Status: DISCONTINUED | OUTPATIENT
Start: 2021-04-20 | End: 2021-04-23

## 2021-04-19 RX ORDER — HYDROMORPHONE HYDROCHLORIDE 1 MG/ML
0.8 INJECTION, SOLUTION INTRAMUSCULAR; INTRAVENOUS; SUBCUTANEOUS EVERY 2 HOUR PRN
Status: DISCONTINUED | OUTPATIENT
Start: 2021-04-19 | End: 2021-04-23

## 2021-04-19 RX ORDER — ONDANSETRON 2 MG/ML
4 INJECTION INTRAMUSCULAR; INTRAVENOUS AS NEEDED
Status: DISCONTINUED | OUTPATIENT
Start: 2021-04-19 | End: 2021-04-19 | Stop reason: HOSPADM

## 2021-04-19 RX ORDER — ONDANSETRON 2 MG/ML
4 INJECTION INTRAMUSCULAR; INTRAVENOUS EVERY 4 HOURS PRN
Status: DISCONTINUED | OUTPATIENT
Start: 2021-04-19 | End: 2021-04-23

## 2021-04-19 RX ORDER — HEPARIN SODIUM 5000 [USP'U]/ML
INJECTION, SOLUTION INTRAVENOUS; SUBCUTANEOUS
Status: DISPENSED
Start: 2021-04-19 | End: 2021-04-19

## 2021-04-19 RX ORDER — MAGNESIUM OXIDE 400 MG (241.3 MG MAGNESIUM) TABLET
400 TABLET DAILY
Status: DISCONTINUED | OUTPATIENT
Start: 2021-04-19 | End: 2021-04-23

## 2021-04-19 RX ORDER — ROCURONIUM BROMIDE 10 MG/ML
INJECTION, SOLUTION INTRAVENOUS AS NEEDED
Status: DISCONTINUED | OUTPATIENT
Start: 2021-04-19 | End: 2021-04-19 | Stop reason: SURG

## 2021-04-19 RX ORDER — GABAPENTIN 100 MG/1
CAPSULE ORAL
Status: DISPENSED
Start: 2021-04-19 | End: 2021-04-19

## 2021-04-19 RX ORDER — SODIUM CHLORIDE, SODIUM LACTATE, POTASSIUM CHLORIDE, CALCIUM CHLORIDE 600; 310; 30; 20 MG/100ML; MG/100ML; MG/100ML; MG/100ML
INJECTION, SOLUTION INTRAVENOUS CONTINUOUS
Status: DISCONTINUED | OUTPATIENT
Start: 2021-04-19 | End: 2021-04-19 | Stop reason: HOSPADM

## 2021-04-19 RX ORDER — OXYCODONE HYDROCHLORIDE 5 MG/1
5 TABLET ORAL EVERY 4 HOURS PRN
Status: DISCONTINUED | OUTPATIENT
Start: 2021-04-19 | End: 2021-04-23

## 2021-04-19 RX ORDER — METRONIDAZOLE 500 MG/100ML
INJECTION, SOLUTION INTRAVENOUS
Status: DISPENSED
Start: 2021-04-19 | End: 2021-04-19

## 2021-04-19 RX ORDER — OXYCODONE HYDROCHLORIDE 10 MG/1
10 TABLET ORAL EVERY 4 HOURS PRN
Status: DISCONTINUED | OUTPATIENT
Start: 2021-04-19 | End: 2021-04-23

## 2021-04-19 RX ORDER — DEXAMETHASONE SODIUM PHOSPHATE 4 MG/ML
VIAL (ML) INJECTION AS NEEDED
Status: DISCONTINUED | OUTPATIENT
Start: 2021-04-19 | End: 2021-04-19 | Stop reason: SURG

## 2021-04-19 RX ORDER — LABETALOL HYDROCHLORIDE 5 MG/ML
5 INJECTION, SOLUTION INTRAVENOUS EVERY 5 MIN PRN
Status: DISCONTINUED | OUTPATIENT
Start: 2021-04-19 | End: 2021-04-19 | Stop reason: HOSPADM

## 2021-04-19 RX ORDER — HYDROMORPHONE HYDROCHLORIDE 1 MG/ML
0.4 INJECTION, SOLUTION INTRAMUSCULAR; INTRAVENOUS; SUBCUTANEOUS EVERY 2 HOUR PRN
Status: DISCONTINUED | OUTPATIENT
Start: 2021-04-19 | End: 2021-04-23

## 2021-04-19 RX ORDER — DEXAMETHASONE SODIUM PHOSPHATE 10 MG/ML
INJECTION, SOLUTION INTRAMUSCULAR; INTRAVENOUS AS NEEDED
Status: DISCONTINUED | OUTPATIENT
Start: 2021-04-19 | End: 2021-04-19 | Stop reason: SURG

## 2021-04-19 RX ORDER — GABAPENTIN 100 MG/1
200 CAPSULE ORAL ONCE
Status: COMPLETED | OUTPATIENT
Start: 2021-04-19 | End: 2021-04-19

## 2021-04-19 RX ORDER — METOCLOPRAMIDE HYDROCHLORIDE 5 MG/ML
INJECTION INTRAMUSCULAR; INTRAVENOUS AS NEEDED
Status: DISCONTINUED | OUTPATIENT
Start: 2021-04-19 | End: 2021-04-19 | Stop reason: SURG

## 2021-04-19 RX ORDER — HYDROMORPHONE HYDROCHLORIDE 1 MG/ML
INJECTION, SOLUTION INTRAMUSCULAR; INTRAVENOUS; SUBCUTANEOUS
Status: COMPLETED
Start: 2021-04-19 | End: 2021-04-19

## 2021-04-19 RX ORDER — FAMOTIDINE 10 MG/ML
20 INJECTION, SOLUTION INTRAVENOUS 2 TIMES DAILY
Status: DISCONTINUED | OUTPATIENT
Start: 2021-04-19 | End: 2021-04-23

## 2021-04-19 RX ORDER — PHENYLEPHRINE HCL 10 MG/ML
VIAL (ML) INJECTION AS NEEDED
Status: DISCONTINUED | OUTPATIENT
Start: 2021-04-19 | End: 2021-04-19 | Stop reason: SURG

## 2021-04-19 RX ORDER — ONDANSETRON 2 MG/ML
INJECTION INTRAMUSCULAR; INTRAVENOUS AS NEEDED
Status: DISCONTINUED | OUTPATIENT
Start: 2021-04-19 | End: 2021-04-19 | Stop reason: SURG

## 2021-04-19 RX ADMIN — KETOROLAC TROMETHAMINE 15 MG: 30 INJECTION, SOLUTION INTRAMUSCULAR; INTRAVENOUS at 10:07:00

## 2021-04-19 RX ADMIN — METRONIDAZOLE 500 MG: 500 INJECTION, SOLUTION INTRAVENOUS at 07:37:00

## 2021-04-19 RX ADMIN — LIDOCAINE HYDROCHLORIDE ANHYDROUS AND DEXTROSE MONOHYDRATE 2 MG/KG/HR: .8; 5 INJECTION, SOLUTION INTRAVENOUS at 07:39:00

## 2021-04-19 RX ADMIN — DEXAMETHASONE SODIUM PHOSPHATE 4 MG: 10 INJECTION, SOLUTION INTRAMUSCULAR; INTRAVENOUS at 10:24:00

## 2021-04-19 RX ADMIN — PHENYLEPHRINE HCL 100 MCG: 10 MG/ML VIAL (ML) INJECTION at 08:11:00

## 2021-04-19 RX ADMIN — BUPIVACAINE HYDROCHLORIDE 60 ML: 2.5 INJECTION, SOLUTION EPIDURAL; INFILTRATION; INTRACAUDAL at 10:24:00

## 2021-04-19 RX ADMIN — ROCURONIUM BROMIDE 20 MG: 10 INJECTION, SOLUTION INTRAVENOUS at 09:58:00

## 2021-04-19 RX ADMIN — SODIUM CHLORIDE: 9 INJECTION, SOLUTION INTRAVENOUS at 10:04:00

## 2021-04-19 RX ADMIN — PHENYLEPHRINE HCL 100 MCG: 10 MG/ML VIAL (ML) INJECTION at 07:47:00

## 2021-04-19 RX ADMIN — SODIUM CHLORIDE: 9 INJECTION, SOLUTION INTRAVENOUS at 08:47:00

## 2021-04-19 RX ADMIN — SODIUM CHLORIDE: 9 INJECTION, SOLUTION INTRAVENOUS at 07:30:00

## 2021-04-19 RX ADMIN — ROCURONIUM BROMIDE 20 MG: 10 INJECTION, SOLUTION INTRAVENOUS at 09:04:00

## 2021-04-19 RX ADMIN — LIDOCAINE HYDROCHLORIDE 50 MG: 10 INJECTION, SOLUTION EPIDURAL; INFILTRATION; INTRACAUDAL; PERINEURAL at 07:34:00

## 2021-04-19 RX ADMIN — ROCURONIUM BROMIDE 70 MG: 10 INJECTION, SOLUTION INTRAVENOUS at 07:34:00

## 2021-04-19 RX ADMIN — DEXAMETHASONE SODIUM PHOSPHATE 8 MG: 4 MG/ML VIAL (ML) INJECTION at 07:39:00

## 2021-04-19 RX ADMIN — METOCLOPRAMIDE HYDROCHLORIDE 10 MG: 5 INJECTION INTRAMUSCULAR; INTRAVENOUS at 07:39:00

## 2021-04-19 RX ADMIN — ONDANSETRON 4 MG: 2 INJECTION INTRAMUSCULAR; INTRAVENOUS at 10:07:00

## 2021-04-19 NOTE — PLAN OF CARE
Problem: RESPIRATORY - ADULT  Goal: Achieves optimal ventilation and oxygenation  Description: INTERVENTIONS:  - Assess for changes in respiratory status  - Assess for changes in mentation and behavior  - Position to facilitate oxygenation and minimize r care  - Consider collaborating with pharmacy to review patient's medication profile  - Implement strategies to promote bladder emptying  Outcome: Progressing     Problem: SKIN/TISSUE INTEGRITY - ADULT  Goal: Skin integrity remains intact  Description: INTE

## 2021-04-19 NOTE — ANESTHESIA PROCEDURE NOTES
Airway  Urgency: elective    Airway not difficult    General Information and Staff    Patient location during procedure: OR  Anesthesiologist: Rick Berry MD  Resident/CRNA: Jillian Fuller CRNA  Performed: CRNA     Indications and Patient Condition  In

## 2021-04-19 NOTE — OPERATIVE REPORT
[facility]    PATIENT'S NAME: Cecilia Hughes   ATTENDING PHYSICIAN: Ita Sosa D.O.   OPERATING PHYSICIAN: Ita Sosa D.O.   PATIENT ACCOUNT#:   [de-identified]    LOCATION:  PACU [facility] PACU 4 EDWP 10  MEDICAL RECORD #:   [id number]       DATE OF safe dissection with the laparoscopic technique and therefore the procedure was converted to an open technique. Skin incision was made from the umbilicus to the suprapubic region using a #10 scalpel blade. The Devan retractor was placed in the wound. up onto the post of the anvil. This was secured with a 2-0 Vicryl x1. Extra mucosal tissue was amputated and the strike plate was cleared of any extraneous tissue of appendices epiploica or mesocolon. Attention was then directed down toward the rectum. stapling device. Drain sutured into place using a 3-0 nylon suture x1. Sterile dressing was applied. The patient was extubated in the operative suite and transported to recovery room in stable condition.     Dictated By SARAH Houston: 04/19/2021

## 2021-04-19 NOTE — ANESTHESIA POSTPROCEDURE EVALUATION
[address] Patient Status:  Inpatient   Age/Gender 69year old female MRN [id number]   [facility] SURGERY Attending Alex Officer, [address] [facility] Day # 0 PCP Maurice Wan MD       Anesthesia Post-op Note    Lester Hillman

## 2021-04-19 NOTE — ANESTHESIA PREPROCEDURE EVALUATION
PRE-OP EVALUATION    Patient Name: Hubert Suarez    Admit Diagnosis: Diverticulitis [K57.92]    Pre-op Diagnosis: Diverticulitis [K57.92]    LAPAROSCOPIC LOW ANTERIOR COLON RESECTION    Anesthesia Procedure: LAPAROSCOPIC LOW ANTERIOR COLON RESECTION specified  Arthritis Wears glasses  Visual impairment           Past Surgical History:   Procedure Laterality Date   • OTHER  01/2021    drain pelvic abcess     Social History    Tobacco Use      Smoking status: Never Smoker      Smokeless tobacco: Never U

## 2021-04-19 NOTE — ANESTHESIA PROCEDURE NOTES
[facility]  Performed by: Toribio Slade CRNA  Authorized by: Cuco Whitney MD       General Information and Staff    Start Time:   Anesthesiologist: Cuco Whitney MD  CRNA:  Toribio Slade CRNA  Performed by:  CRNA  Patient Location:  OR    Block

## 2021-04-19 NOTE — H&P
This was the patient's first attack however she had a intramural abscess. This was not amenable to drainage. Patient did resolve her infection with IV antibiotics and currently feels well. She states she has just a few days of antibiotics left.   States 100 mg by mouth daily. , Disp: , Rfl:      •  Zinc Sulfate (ZINC 15 OR), Take 1 tablet by mouth daily. , Disp: , Rfl:      •  Cyanocobalamin (VITAMIN B12 OR), Take 1 tablet by mouth daily. , Disp: , Rfl:      •  Vitamin B-1 (VITAMIN B1) 100 MG Oral Tab, Albino jaundice  HEENT: denies nasal congestion, sinus pain or sore throat; hearing loss negative,   EYES , no diplopia or vision changes  RESPIRATORY: denies shortness of breath, wheezing or cough   CARDIOVASCULAR: denies chest pain or CASAS; no palpitations   GI: positive test result for COVID-19.    A \"Not Detected\" result for this test means that SARS-CoV-2 RNA was not present in the sample above the limit of detection of the assay.      Test performed using the Abbott ID NOW COVID-19 assay performed on the ID N uL Final   • Neutrophil Absolute 01/28/2021 12.52* 1.50 - 7.70 x10(3) uL Final   • Lymphocyte Absolute 01/28/2021 1.53  1.00 - 4.00 x10(3) uL Final   • Monocyte Absolute 01/28/2021 1.01* 0.10 - 1.00 x10(3) uL Final   • Eosinophil Absolute 01/28/2021 0.05 Cells 01/29/2021 None Seen  Small /LPF Final   • Renal Tubular Epithelial Cells 01/29/2021 None Seen  Small /LPF Final   • Transitional Cells 01/29/2021 None Seen  Small /LPF Final   • Yeast Urine 01/29/2021 None Seen  None Seen Final   • Urine Culture 01/ laparoscopic possible open low anterior colon resection with proctoscopy coloanastomosis discussed with patient and  risks of surgery to include bleeding infection pneumonia DVT ureteral injury and anastomotic leakage.

## 2021-04-19 NOTE — PLAN OF CARE
Patient drowsy, awakes easily with verbal stimuli. Harvey in place, SHALONDA compressed. Aquacel midline, 2 lap sites with gauze/Tegaderm. POC discussed with patient and . All questions and concerns addressed. Will continue to monitor.

## 2021-04-20 PROCEDURE — 80048 BASIC METABOLIC PNL TOTAL CA: CPT | Performed by: PHYSICIAN ASSISTANT

## 2021-04-20 PROCEDURE — 87340 HEPATITIS B SURFACE AG IA: CPT | Performed by: PHYSICIAN ASSISTANT

## 2021-04-20 PROCEDURE — 83735 ASSAY OF MAGNESIUM: CPT | Performed by: PHYSICIAN ASSISTANT

## 2021-04-20 PROCEDURE — 97535 SELF CARE MNGMENT TRAINING: CPT

## 2021-04-20 PROCEDURE — 97166 OT EVAL MOD COMPLEX 45 MIN: CPT

## 2021-04-20 PROCEDURE — 97161 PT EVAL LOW COMPLEX 20 MIN: CPT

## 2021-04-20 PROCEDURE — 86701 HIV-1ANTIBODY: CPT | Performed by: PHYSICIAN ASSISTANT

## 2021-04-20 PROCEDURE — 85025 COMPLETE CBC W/AUTO DIFF WBC: CPT | Performed by: PHYSICIAN ASSISTANT

## 2021-04-20 PROCEDURE — 97530 THERAPEUTIC ACTIVITIES: CPT

## 2021-04-20 PROCEDURE — 86803 HEPATITIS C AB TEST: CPT | Performed by: PHYSICIAN ASSISTANT

## 2021-04-20 PROCEDURE — 97116 GAIT TRAINING THERAPY: CPT

## 2021-04-20 PROCEDURE — 84100 ASSAY OF PHOSPHORUS: CPT | Performed by: PHYSICIAN ASSISTANT

## 2021-04-20 NOTE — PROGRESS NOTES
[facility]  Progress Note    Karincorina [place] Patient Status:  Inpatient    1950 MRN [id number]   [facility] 3NW-A Attending Cassie Talley, DO   Hosp Day # 1 PCP Jane Cortez MD     Subjective:   The patient is sitting up Leukocytosis     Thyromegaly     Arthritis of knee, right     Thickened endometrium     Diverticulitis      POD 1 laparoscopic converted open low anterior colon resection    Plan:  1. Continue clear liquid diet  2. Await further bowel function  3.  Eras pro

## 2021-04-20 NOTE — OCCUPATIONAL THERAPY NOTE
OCCUPATIONAL THERAPY QUICK EVALUATION - INPATIENT    Room Number: 318/318-A  Evaluation Date: 4/20/2021     Type of Evaluation: Initial  Presenting Problem: colon resection    Physician Order: IP Consult to Occupational Therapy  Reason for Therapy:  ADL/IA Walk-in shower  Other Equipment: None    Occupation/Status: retired  Hand Dominance: Right  Drives: Yes       Prior Level of Function: Pt typically independent with ADLs and mobility. Pt does not use AD. SUBJECTIVE   Pt stated, \"I am doing well. \"    P clothing management, and t/fs. Pt educated on log roll technique and mobility with surgical precautions    Patient End of Session: Up in chair;Needs met;Call light within reach; All patient questions and concerns addressed;SCDs in place; Alarm set    ASSESS

## 2021-04-20 NOTE — PLAN OF CARE
Pt alert and orientedx4. Room air. Pulse Ox. Encouraged to use IS. SCDs. Clear liquid diet and tolerating well. Ahrvey in place, clear yellow urine, will be removed in the AM. ERAS protocol. HOB >30.  Scheduled tylenol and Toradol for pain management, needed function  - Maintain adequate hydration with IV or PO as ordered and tolerated  - Evaluate effectiveness of GI medications  - Encourage mobilization and activity  - Obtain nutritional consult as needed  - Establish a toileting routine/schedule  - Consider measures as appropriate and evaluate response  - Consider cultural and social influences on pain and pain management  - Manage/alleviate anxiety  - Utilize distraction and/or relaxation techniques  - Monitor for opioid side effects  - Notify MD/LIP if inte

## 2021-04-20 NOTE — PHYSICAL THERAPY NOTE
PHYSICAL THERAPY EVALUATION - INPATIENT     Room Number: 318/318-A  Evaluation Date: 4/20/2021  Type of Evaluation: Initial  Physician Order: PT Eval and Treat    Presenting Problem: colon resection  Reason for Therapy: Mobility Dysfunction and Disch Yellow Medicine: Pt independent prior to admission, ambulating without device. She reports chronic R knee pain. SUBJECTIVE  Pt is pleasant and cooperative    Patient self-stated goal is go home    OBJECTIVE  Precautions:  Other (Comment) (surgical )  Fall R Decreased stance time          Skilled Therapy Provided: Pt found in hallway ambulating with PCT. Pt ambulated additional distance with RW, supervision, demonstrating steady consistent gait pattern. Pt performed bed mobility sit<>supine with supervision, cu assistance level: modified independent     Goal #3 Patient is able to ambulate 200 feet with assist device: none at assistance level: modified independent     Goal #4 Pt will negotiate 1 flight of stairs, Mod I.    Goal #5    Goal #6    Goal Comments: Goal

## 2021-04-21 PROCEDURE — 90472 IMMUNIZATION ADMIN EACH ADD: CPT | Performed by: PHYSICIAN ASSISTANT

## 2021-04-21 PROCEDURE — 90471 IMMUNIZATION ADMIN: CPT | Performed by: PHYSICIAN ASSISTANT

## 2021-04-21 PROCEDURE — 97530 THERAPEUTIC ACTIVITIES: CPT

## 2021-04-21 PROCEDURE — 97116 GAIT TRAINING THERAPY: CPT

## 2021-04-21 PROCEDURE — 85025 COMPLETE CBC W/AUTO DIFF WBC: CPT | Performed by: SURGERY

## 2021-04-21 NOTE — PHYSICAL THERAPY NOTE
PHYSICAL THERAPY TREATMENT NOTE - INPATIENT    Room Number: 318/318-A     Session: 1   Number of Visits to Meet Established Goals: 2    Presenting Problem: colon resection     History related to current admission: Pt admitted  For diverticulitis with pelv Rating: Unable to rate  Location: abdomen  Management Techniques: Activity promotion; Body mechanics;Breathing techniques;Repositioning;Relaxation    BALANCE functional PT goals and is safe to return home when medically appropriate. No further skilled IPPT is indicated. Therefore, will sign off. DISCHARGE RECOMMENDATIONS  PT Discharge Recommendations: Home     PLAN  PT Treatment Plan: Bed mobility; Body mec

## 2021-04-21 NOTE — PLAN OF CARE
Pt is alert and oriented x4. Lungs are clear bilaterally on room air. Bowel sounds are hypoactive. Pt denies passing flatus. Denies nausea. Tolerating clears. Midline incision with aquacel CDI. Pain score 7/10. Oral pain medication administered.  Pt is void tissue  - Implement wound care per orders  - Initiate isolation precautions as appropriate  - Initiate Pressure Ulcer prevention bundle as indicated  Outcome: Progressing     Problem: PAIN - ADULT  Goal: Verbalizes/displays adequate comfort level or patien

## 2021-04-21 NOTE — PLAN OF CARE
Alert & oriented x 4. Up in chair most of shift. Ambulating in the hallway. Tolerating clear liq diet. Wes dc'd this am. Pt voiding very small amts. Attempted bladder scan. Unable to get reliable read d/t abdominal dressing.  Tylenol, toradol & oxy 10 mg

## 2021-04-21 NOTE — PROGRESS NOTES
[facility]  Progress Note    Phu Mims Patient Status:  Inpatient    1950 MRN [id number]   [facility] 3NW-A Attending Alex Officer, DO   Hosp Day # 2 PCP Maurice Wan MD     Subjective:   The patient is sitting up colon resection    Plan:  1. The patient is doing well postoperatively. 2. Continue to await further return of bowel function. 3. Continue clear liquid diet. Will advance diet once the patient shows evidence of bowel function.   4. DC midline dressing to

## 2021-04-22 PROCEDURE — S0028 INJECTION, FAMOTIDINE, 20 MG: HCPCS | Performed by: PHYSICIAN ASSISTANT

## 2021-04-22 RX ORDER — KETOROLAC TROMETHAMINE 15 MG/ML
15 INJECTION, SOLUTION INTRAMUSCULAR; INTRAVENOUS EVERY 6 HOURS PRN
Status: DISCONTINUED | OUTPATIENT
Start: 2021-04-22 | End: 2021-04-23

## 2021-04-22 NOTE — PROGRESS NOTES
[facility]  Progress Note    Helen Lockhart Patient Status:  Inpatient    1950 MRN [id number]   [facility] 3NW-A Attending Asia Joseph,    Hosp Day # 3 PCP Christian Cotter MD     Subjective:   The patient is resting in Thyromegaly     Arthritis of knee, right     Thickened endometrium     Diverticulitis    POD 3 laparoscopic converted to open low anterior colon resection    Plan:  1. Continue clear liquid diet.  Will advance diet once patient exhibits return of bowel func

## 2021-04-22 NOTE — PLAN OF CARE
Pt alert and oriented x 4. Up with stand by assist. Voiding with no difficulties. Lung sounds clear on room air, . Abdomen soft and non-distended. Bowel sounds present. Pt denies flatus, denies nausea, tolerating clear liquids. Pt reports belching.  Midl effectiveness of GI medications  - Encourage mobilization and activity  - Obtain nutritional consult as needed  - Establish a toileting routine/schedule  - Consider collaborating with pharmacy to review patient's medication profile  Outcome: Progressing pain and pain management  - Manage/alleviate anxiety  - Utilize distraction and/or relaxation techniques  - Monitor for opioid side effects  - Notify MD/LIP if interventions unsuccessful or patient reports new pain  - Anticipate increased pain with activit

## 2021-04-23 VITALS
BODY MASS INDEX: 27.31 KG/M2 | RESPIRATION RATE: 18 BRPM | DIASTOLIC BLOOD PRESSURE: 62 MMHG | TEMPERATURE: 98 F | OXYGEN SATURATION: 96 % | HEIGHT: 67 IN | SYSTOLIC BLOOD PRESSURE: 123 MMHG | WEIGHT: 174 LBS | HEART RATE: 81 BPM

## 2021-04-23 RX ORDER — HYDROCODONE BITARTRATE AND ACETAMINOPHEN 5; 325 MG/1; MG/1
1 TABLET ORAL EVERY 6 HOURS PRN
Qty: 15 TABLET | Refills: 0 | Status: SHIPPED | OUTPATIENT
Start: 2021-04-23 | End: 2021-05-19 | Stop reason: ALTCHOICE

## 2021-04-23 NOTE — PROGRESS NOTES
[facility]  Progress Note    Sandi Loges Patient Status:  Inpatient    1950 MRN [id number]   [facility] 3NW-A Attending Larisa Resendez, DO   Hosp Day # 4 PCP Nadine Ross MD     Subjective:   The patient is sitting up 4/26/2021.  4. Continue drain care. 5. Continue pain control. 6. Follow-up with [facility] general surgery in 1 week. My total face time with this patient was 22 minutes.   Greater than half of our visit was spent in counseling the patient on the above list

## 2021-04-23 NOTE — PLAN OF CARE
Assumed care for this pt at [address] [place] [address]. Pt alert oriented x4. Pt passing gas, belching as well as having soft bowel movements. Pt vss on room air, pt talha drain draining small amounts of serosanguinous drainage.  Pt pain is manageable per pt, denies any nausea or v review patient's medication profile  Outcome: Progressing     Problem: GENITOURINARY - ADULT  Goal: Absence of urinary retention  Description: INTERVENTIONS:  - Assess patient’s ability to void and empty bladder  - Monitor intake/output and perform bladder reports new pain  - Anticipate increased pain with activity and pre-medicate as appropriate  Outcome: Progressing

## 2021-04-23 NOTE — DISCHARGE SUMMARY
[facility]  Discharge Summary    Daniel Bel Patient Status:  Inpatient    1950 MRN [id number]   [facility] 3NW-A Attending Rob Mclean, DO   Hosp Day # 4 PCP Arielle Olguin MD     Date of Admission: 2021    Scott none     Disposition: Home to self care   Discharge Condition: Good    Discharge Medications: Current Discharge Medication List    START taking these medications    HYDROcodone-acetaminophen (NORCO) 5-325 MG Oral Tab  Take 1 tablet by mouth every 6 (six) h

## 2021-04-23 NOTE — PLAN OF CARE
Patient is alert and oriented x3    NURSING DISCHARGE NOTE  Pain controlled with tylenol and toradol    Patient assessed and ready for DC home  All instructions given to patient for DC home; including diet and drain instruction.  Patient able to successfull ordered and tolerated  - Evaluate effectiveness of GI medications  - Encourage mobilization and activity  - Obtain nutritional consult as needed  - Establish a toileting routine/schedule  - Consider collaborating with pharmacy to review patient's medicatio appropriate and evaluate response  - Consider cultural and social influences on pain and pain management  - Manage/alleviate anxiety  - Utilize distraction and/or relaxation techniques  - Monitor for opioid side effects  - Notify MD/LIP if interventions un

## 2021-04-23 NOTE — PLAN OF CARE
Tolerating FLD  +BMs  Voiding  Pain controlled with tylenol and toradol  Incision is CDI  Problem: RESPIRATORY - ADULT  Goal: Achieves optimal ventilation and oxygenation  Description: INTERVENTIONS:  - Assess for changes in respiratory status  - Assess fo and perform bladder scan as needed  - Follow urinary retention protocol/standard of care  - Consider collaborating with pharmacy to review patient's medication profile  - Implement strategies to promote bladder emptying  Outcome: Progressing     Problem: S

## 2021-04-26 ENCOUNTER — TELEPHONE (OUTPATIENT)
Dept: FAMILY MEDICINE CLINIC | Facility: CLINIC | Age: 71
End: 2021-04-26

## 2021-04-26 ENCOUNTER — PATIENT OUTREACH (OUTPATIENT)
Dept: CASE MANAGEMENT | Age: 71
End: 2021-04-26

## 2021-04-26 DIAGNOSIS — Z02.9 ENCOUNTERS FOR UNSPECIFIED ADMINISTRATIVE PURPOSE: ICD-10-CM

## 2021-04-26 PROCEDURE — 1111F DSCHRG MED/CURRENT MED MERGE: CPT

## 2021-04-26 NOTE — TELEPHONE ENCOUNTER
Spoke to the pt today for TCM to follow up on the recent colon rescetion. The pt does not have a HFU appt scheduled at this time. A TCM/HFU appt is recommended by 5/7/2021 as the pt is a moderate risk for readmission.  The patient declined an appointment w

## 2021-04-26 NOTE — TELEPHONE ENCOUNTER
Please call and offer visit to follow up TCM with Dr. Cora Jones. Please route back to triage once scheduled for is he defers at this time.

## 2021-04-26 NOTE — PROGRESS NOTES
Initial Post Discharge Follow Up   Discharge Date: 4/23/21  Contact Date: 4/26/2021    Consent Verification:  Assessment Completed With: Patient  HIPAA Verified?   Yes    Discharge Dx:     S/p laparoscopic converted to open low anterior colon resection 1-10, 10 being the worst pain you have ever experienced 4-5/10 (at the highest)  o Is the pain manageable at home? yes; The patient is using tylenol OTC for pain management.    • How well was your pain managed while in the hospital?   o On a scale of 1-5 leaving the hospital? yes  • (NCM) If a new medication was prescribed:    o Was the new medication’s purpose & side effects reviewed? yes  o Do you have any questions about your new medication?  No  • Did you  your discharge medications when you left prefers to see the PCP after general surgery appointments have been completed, and incision staples have been removed.  A TE was sent to the PCP's office for an appointment request.       Have you made all of your follow up appointments? no; as mentioned ab

## 2021-04-29 ENCOUNTER — OFFICE VISIT (OUTPATIENT)
Dept: SURGERY | Facility: CLINIC | Age: 71
End: 2021-04-29

## 2021-04-29 VITALS
HEIGHT: 67 IN | HEART RATE: 83 BPM | DIASTOLIC BLOOD PRESSURE: 65 MMHG | BODY MASS INDEX: 27.31 KG/M2 | SYSTOLIC BLOOD PRESSURE: 120 MMHG | TEMPERATURE: 98 F | WEIGHT: 174 LBS

## 2021-04-29 DIAGNOSIS — Z90.49 STATUS POST PARTIAL RESECTION OF COLON: Primary | ICD-10-CM

## 2021-04-29 PROBLEM — K57.20 DIVERTICULITIS OF LARGE INTESTINE WITH PERFORATION WITHOUT BLEEDING: Status: RESOLVED | Noted: 2021-01-27 | Resolved: 2021-04-29

## 2021-04-29 PROBLEM — K57.92 DIVERTICULITIS: Status: RESOLVED | Noted: 2021-04-19 | Resolved: 2021-04-29

## 2021-04-29 PROBLEM — E87.6 HYPOKALEMIA: Status: RESOLVED | Noted: 2021-01-28 | Resolved: 2021-04-29

## 2021-04-29 PROBLEM — D72.829 LEUKOCYTOSIS: Status: RESOLVED | Noted: 2021-02-05 | Resolved: 2021-04-29

## 2021-04-29 PROCEDURE — 3074F SYST BP LT 130 MM HG: CPT | Performed by: PHYSICIAN ASSISTANT

## 2021-04-29 PROCEDURE — 3008F BODY MASS INDEX DOCD: CPT | Performed by: PHYSICIAN ASSISTANT

## 2021-04-29 PROCEDURE — 99024 POSTOP FOLLOW-UP VISIT: CPT | Performed by: PHYSICIAN ASSISTANT

## 2021-04-29 PROCEDURE — 3078F DIAST BP <80 MM HG: CPT | Performed by: PHYSICIAN ASSISTANT

## 2021-04-29 NOTE — PROGRESS NOTES
Post Operative Visit Note       Active Problems  1. Status post partial resection of colon         Chief Complaint   Patient presents with:  Post-Op: PO 4/19 - LAPAROSCOPIC LOW ANTERIOR COLON RESECTION - PT states has pain talking meds.  PT states wound is • Special screening for malignant neoplasms, colon    • Urinary tract infection, site not specified    • Varicella     \"varicella strep\"   • Visual impairment     glasses   • Wears glasses      Past Surgical History:   Procedure Laterality Date   • OTH Concern: Yes          2 cups coffee daily, 1-2 cups tea daily         Occupational Exposure: No        Hobby Hazards: No        Sleep Concern: Yes          awakens often at night        Stress Concern: No        Weight Concern: Yes          has not been tr myalgias. Skin: Negative for color change and rash. Neurological: Negative for tremors, syncope and weakness. Hematological: Negative for adenopathy. Does not bruise/bleed easily.    Psychiatric/Behavioral: Negative for behavioral problems and sleep d than 20 pounds for 6 weeks from the date of her surgery. 5. All questions and concerns were answered. 6. She is to return to the clinic in 2 weeks for follow-up with Dr. Lucas Rondon, sooner if needed.      No orders of the defined types were placed in this enco

## 2021-05-07 NOTE — TELEPHONE ENCOUNTER
Patient saw surgeon- Dr. Coley Eisenmenger on  4/29/2021 and also has follow up with Dr. Ivette Pandey Scheduled for  5/19/2021.

## 2021-05-19 ENCOUNTER — OFFICE VISIT (OUTPATIENT)
Dept: SURGERY | Facility: CLINIC | Age: 71
End: 2021-05-19

## 2021-05-19 VITALS — TEMPERATURE: 97 F

## 2021-05-19 DIAGNOSIS — K57.32 DIVERTICULITIS OF LARGE INTESTINE, UNSPECIFIED BLEEDING STATUS, UNSPECIFIED COMPLICATION STATUS: Primary | ICD-10-CM

## 2021-05-19 PROCEDURE — 99024 POSTOP FOLLOW-UP VISIT: CPT | Performed by: SURGERY

## 2021-05-19 NOTE — PROGRESS NOTES
[facility]  Progress Note    Doc [facility] Patient Status:  No patient class for patient encounter    1950 MRN [id number]   [address] [facility] Attending No att. providers found   Hosp Day # 0 PCP Gloria Gaspar general treatment was explained to the patient and the family.         DO TURNER Watson General Surgery  5/19/2021  6:08 PM

## 2021-07-08 ENCOUNTER — HOSPITAL ENCOUNTER (OUTPATIENT)
Dept: MAMMOGRAPHY | Age: 71
Discharge: HOME | End: 2021-07-08
Attending: FAMILY MEDICINE
Payer: MEDICARE

## 2021-07-08 DIAGNOSIS — Z12.31 ENCOUNTER FOR SCREENING MAMMOGRAM FOR MALIGNANT NEOPLASM OF BREAST: ICD-10-CM

## 2021-07-08 PROCEDURE — 77063 BREAST TOMOSYNTHESIS BI: CPT | Performed by: FAMILY MEDICINE

## 2021-07-08 PROCEDURE — 77067 SCR MAMMO BI INCL CAD: CPT | Performed by: FAMILY MEDICINE

## 2021-07-28 ENCOUNTER — MED REC SCAN ONLY (OUTPATIENT)
Dept: FAMILY MEDICINE CLINIC | Facility: CLINIC | Age: 71
End: 2021-07-28

## 2021-09-01 ENCOUNTER — MED REC SCAN ONLY (OUTPATIENT)
Dept: FAMILY MEDICINE CLINIC | Facility: CLINIC | Age: 71
End: 2021-09-01

## 2021-11-01 ENCOUNTER — OFFICE VISIT (OUTPATIENT)
Dept: FAMILY MEDICINE CLINIC | Facility: CLINIC | Age: 71
End: 2021-11-01
Payer: COMMERCIAL

## 2021-11-01 ENCOUNTER — LAB ENCOUNTER (OUTPATIENT)
Dept: LAB | Age: 71
End: 2021-11-01
Attending: FAMILY MEDICINE
Payer: MEDICARE

## 2021-11-01 VITALS
DIASTOLIC BLOOD PRESSURE: 62 MMHG | HEIGHT: 67 IN | SYSTOLIC BLOOD PRESSURE: 120 MMHG | OXYGEN SATURATION: 98 % | BODY MASS INDEX: 28.25 KG/M2 | HEART RATE: 81 BPM | TEMPERATURE: 97 F | WEIGHT: 180 LBS | RESPIRATION RATE: 18 BRPM

## 2021-11-01 DIAGNOSIS — E01.0 THYROMEGALY: ICD-10-CM

## 2021-11-01 DIAGNOSIS — Z13.6 SCREENING FOR CARDIOVASCULAR CONDITION: ICD-10-CM

## 2021-11-01 DIAGNOSIS — M19.049 LOCALIZED, PRIMARY OSTEOARTHRITIS OF HAND, UNSPECIFIED LATERALITY: ICD-10-CM

## 2021-11-01 DIAGNOSIS — E78.00 PURE HYPERCHOLESTEROLEMIA: ICD-10-CM

## 2021-11-01 DIAGNOSIS — R93.89 THICKENED ENDOMETRIUM: ICD-10-CM

## 2021-11-01 DIAGNOSIS — Z00.00 ENCOUNTER FOR ANNUAL HEALTH EXAMINATION: Primary | ICD-10-CM

## 2021-11-01 DIAGNOSIS — M43.16 SPONDYLOLISTHESIS OF LUMBAR REGION: ICD-10-CM

## 2021-11-01 DIAGNOSIS — R39.15 URINARY URGENCY: ICD-10-CM

## 2021-11-01 DIAGNOSIS — M85.80 OSTEOPENIA, UNSPECIFIED LOCATION: ICD-10-CM

## 2021-11-01 PROCEDURE — 80061 LIPID PANEL: CPT

## 2021-11-01 PROCEDURE — 3074F SYST BP LT 130 MM HG: CPT | Performed by: FAMILY MEDICINE

## 2021-11-01 PROCEDURE — 96160 PT-FOCUSED HLTH RISK ASSMT: CPT | Performed by: FAMILY MEDICINE

## 2021-11-01 PROCEDURE — 3008F BODY MASS INDEX DOCD: CPT | Performed by: FAMILY MEDICINE

## 2021-11-01 PROCEDURE — 3078F DIAST BP <80 MM HG: CPT | Performed by: FAMILY MEDICINE

## 2021-11-01 PROCEDURE — G0439 PPPS, SUBSEQ VISIT: HCPCS | Performed by: FAMILY MEDICINE

## 2021-11-01 PROCEDURE — 80053 COMPREHEN METABOLIC PANEL: CPT

## 2021-11-01 PROCEDURE — 36415 COLL VENOUS BLD VENIPUNCTURE: CPT

## 2021-11-01 PROCEDURE — 99397 PER PM REEVAL EST PAT 65+ YR: CPT | Performed by: FAMILY MEDICINE

## 2021-11-01 PROCEDURE — 81003 URINALYSIS AUTO W/O SCOPE: CPT | Performed by: FAMILY MEDICINE

## 2021-11-01 NOTE — PROGRESS NOTES
Patient presents with:  Physical    HPI:   Princess Han is a 66year old female who presents for a MA (Medicare Advantage) [facility] (Once per calendar year).      Urge incontinence since her surgery 6 months ago, sdtates when she wakes up at night soft tissue of unspecified site     Primary localized osteoarthrosis, hand     Lipoma of upper extremity     Spondylolisthesis of lumbar region     Mass of left hand     Urinary urgency     Thyromegaly     Arthritis of knee, right     Thickened endometrium sites, Polydipsia, Pure hypercholesterolemia, Routine general medical examination at a health care facility, Routine gynecological examination, Screening for diabetes mellitus, Screening for lipoid disorders, Screening for malignant neoplasm of the rectum, and polyuria. Genitourinary: Negative for dysuria, urgency, frequency and difficulty urinating. Musculoskeletal: Negative for myalgias, joint swelling, joint pain and gait problem. Skin: Negative for rash.    Allergic/Immunologic: Negative for food al Neurological: She is alert and oriented to person, place, and time. She has normal reflexes. Skin: Skin is warm. No rash noted. Psychiatric: She has a normal mood and affect.  Her behavior is normal. Judgment and thought content normal.     Travis six months, have you lost more than 10 pounds without trying?: 2 - No  Has your appetite been poor?: No  How does the patient maintain a good energy level?: Stretching  How would you describe your daily physical activity?: Moderate  How would you describe previous colonoscopy was abnormal 01/10/2018    Colonoscopy due on 01/10/2023    Flexible Sigmoidoscopy   Covered every 4 years 03/29/2021    Fecal Occult Blood Test Covered annually -   Bone Density Screening    Bone density screening    Covered every 2 y

## 2021-11-01 NOTE — PATIENT INSTRUCTIONS
[employer]'s SCREENING SCHEDULE   Tests on this list are recommended by your physician but may not be covered, or covered at this frequency, by your insurer. Please check with your insurance carrier before scheduling to verify coverage.    PREV DENSITOMETRY (CPT=77080) 04/22/2019      No recommendations at this time   Pap and Pelvic    Pap   Covered every 2 years for women at normal risk;  Annually if at high risk -  No recommendations at this time    Chlamydia Annually if high risk -  No recommen Association regarding Advance Directives.

## 2021-11-14 PROBLEM — R22.32 MASS OF LEFT HAND: Status: RESOLVED | Noted: 2020-06-29 | Resolved: 2021-11-14

## 2021-11-23 ENCOUNTER — TELEPHONE (OUTPATIENT)
Dept: FAMILY MEDICINE CLINIC | Facility: CLINIC | Age: 71
End: 2021-11-23

## 2021-11-23 DIAGNOSIS — R13.14 PHARYNGOESOPHAGEAL DYSPHAGIA: ICD-10-CM

## 2021-11-23 DIAGNOSIS — K21.9 GASTROESOPHAGEAL REFLUX DISEASE, UNSPECIFIED WHETHER ESOPHAGITIS PRESENT: Primary | ICD-10-CM

## 2021-11-23 RX ORDER — PANTOPRAZOLE SODIUM 40 MG/1
40 TABLET, DELAYED RELEASE ORAL
Qty: 30 TABLET | Refills: 0 | Status: SHIPPED | OUTPATIENT
Start: 2021-11-23 | End: 2021-12-23

## 2021-11-23 NOTE — TELEPHONE ENCOUNTER
Since she has symptoms of dysphagia would like her to see GI, please refer to [address]. JAY have sent a prescription for pantoprazole for her to take until her appointment.

## 2021-11-23 NOTE — TELEPHONE ENCOUNTER
Called patient and advised of prescription and recommendations per Dr. Roth Cordial notes. Contact information given for Dr. Ellen Aguero to call for appt.       States already purchased OTC Nexium and wanted to know if it's OK if she takes that instead of the

## 2021-11-23 NOTE — TELEPHONE ENCOUNTER
Dr. Simran Starks,  Please advise    LOV 11/1/21     Physical (with Dr. Simran Starks)  Arielle Pile symptoms, has been taking prilosec, patient states sometimes feels like food gets stick when swallowing, better since she started the prilosec.     Last Rx   Omeprazole 36 M

## 2021-11-23 NOTE — TELEPHONE ENCOUNTER
Pt called stating she saw Dr Donna Hickey on 11-1-21. Heartburn was discussed, she stated the Prilosec as instructed and completed. Heartburn is back. Should she start again & possibly change to Nexium?

## 2021-11-24 NOTE — TELEPHONE ENCOUNTER
Pt called, informed what doctor said. Told her if nurses needed to speak with pt they would call back, but ok per MD to try nexium. Pt also said that she cannot get in with Dr. Cali Ken until March.

## 2022-01-12 ENCOUNTER — MED REC SCAN ONLY (OUTPATIENT)
Dept: FAMILY MEDICINE CLINIC | Facility: CLINIC | Age: 72
End: 2022-01-12

## 2022-01-20 PROBLEM — N39.46 MIXED INCONTINENCE: Status: ACTIVE | Noted: 2022-01-20

## 2022-03-29 ENCOUNTER — TELEPHONE (OUTPATIENT)
Dept: FAMILY MEDICINE CLINIC | Facility: CLINIC | Age: 72
End: 2022-03-29

## 2022-04-12 ENCOUNTER — LAB ENCOUNTER (OUTPATIENT)
Dept: LAB | Facility: HOSPITAL | Age: 72
End: 2022-04-12
Attending: INTERNAL MEDICINE
Payer: MEDICARE

## 2022-04-12 DIAGNOSIS — Z01.812 ENCOUNTER FOR PREOPERATIVE SCREENING LABORATORY TESTING FOR COVID-19 VIRUS: ICD-10-CM

## 2022-04-12 DIAGNOSIS — Z20.822 ENCOUNTER FOR PREOPERATIVE SCREENING LABORATORY TESTING FOR COVID-19 VIRUS: ICD-10-CM

## 2022-04-13 LAB — SARS-COV-2 RNA RESP QL NAA+PROBE: NOT DETECTED

## 2022-04-15 ENCOUNTER — HOSPITAL ENCOUNTER (OUTPATIENT)
Facility: HOSPITAL | Age: 72
Setting detail: HOSPITAL OUTPATIENT SURGERY
Discharge: HOME | End: 2022-04-15
Attending: INTERNAL MEDICINE | Admitting: INTERNAL MEDICINE
Payer: MEDICARE

## 2022-04-15 ENCOUNTER — ANESTHESIA EVENT (OUTPATIENT)
Dept: ENDOSCOPY | Facility: HOSPITAL | Age: 72
End: 2022-04-15
Payer: MEDICARE

## 2022-04-15 ENCOUNTER — ANESTHESIA (OUTPATIENT)
Dept: ENDOSCOPY | Facility: HOSPITAL | Age: 72
End: 2022-04-15
Payer: MEDICARE

## 2022-04-15 VITALS
SYSTOLIC BLOOD PRESSURE: 116 MMHG | TEMPERATURE: 98 F | WEIGHT: 184 LBS | RESPIRATION RATE: 18 BRPM | BODY MASS INDEX: 27.89 KG/M2 | HEIGHT: 68 IN | DIASTOLIC BLOOD PRESSURE: 69 MMHG | HEART RATE: 73 BPM | OXYGEN SATURATION: 99 %

## 2022-04-15 DIAGNOSIS — R12 CHRONIC HEARTBURN: ICD-10-CM

## 2022-04-15 DIAGNOSIS — Z01.812 ENCOUNTER FOR PREOPERATIVE SCREENING LABORATORY TESTING FOR COVID-19 VIRUS: Primary | ICD-10-CM

## 2022-04-15 DIAGNOSIS — R19.4 CHANGE IN BOWEL HABITS: ICD-10-CM

## 2022-04-15 DIAGNOSIS — Z80.0 FAMILY HISTORY OF COLON CANCER: ICD-10-CM

## 2022-04-15 DIAGNOSIS — Z20.822 ENCOUNTER FOR PREOPERATIVE SCREENING LABORATORY TESTING FOR COVID-19 VIRUS: Primary | ICD-10-CM

## 2022-04-15 DIAGNOSIS — R10.32 LLQ PAIN: ICD-10-CM

## 2022-04-15 PROCEDURE — 0DBL8ZX EXCISION OF TRANSVERSE COLON, VIA NATURAL OR ARTIFICIAL OPENING ENDOSCOPIC, DIAGNOSTIC: ICD-10-PCS | Performed by: INTERNAL MEDICINE

## 2022-04-15 PROCEDURE — 88305 TISSUE EXAM BY PATHOLOGIST: CPT | Performed by: INTERNAL MEDICINE

## 2022-04-15 PROCEDURE — 0DB78ZX EXCISION OF STOMACH, PYLORUS, VIA NATURAL OR ARTIFICIAL OPENING ENDOSCOPIC, DIAGNOSTIC: ICD-10-PCS | Performed by: INTERNAL MEDICINE

## 2022-04-15 PROCEDURE — 0DB68ZX EXCISION OF STOMACH, VIA NATURAL OR ARTIFICIAL OPENING ENDOSCOPIC, DIAGNOSTIC: ICD-10-PCS | Performed by: INTERNAL MEDICINE

## 2022-04-15 PROCEDURE — 0DB98ZX EXCISION OF DUODENUM, VIA NATURAL OR ARTIFICIAL OPENING ENDOSCOPIC, DIAGNOSTIC: ICD-10-PCS | Performed by: INTERNAL MEDICINE

## 2022-04-15 RX ORDER — NALOXONE HYDROCHLORIDE 0.4 MG/ML
80 INJECTION, SOLUTION INTRAMUSCULAR; INTRAVENOUS; SUBCUTANEOUS AS NEEDED
OUTPATIENT
Start: 2022-04-15 | End: 2022-04-16

## 2022-04-15 RX ORDER — SODIUM CHLORIDE, SODIUM LACTATE, POTASSIUM CHLORIDE, CALCIUM CHLORIDE 600; 310; 30; 20 MG/100ML; MG/100ML; MG/100ML; MG/100ML
INJECTION, SOLUTION INTRAVENOUS CONTINUOUS
Status: DISCONTINUED | OUTPATIENT
Start: 2022-04-15 | End: 2022-04-15

## 2022-04-15 RX ORDER — LIDOCAINE HYDROCHLORIDE 10 MG/ML
INJECTION, SOLUTION EPIDURAL; INFILTRATION; INTRACAUDAL; PERINEURAL AS NEEDED
Status: DISCONTINUED | OUTPATIENT
Start: 2022-04-15 | End: 2022-04-15 | Stop reason: SURG

## 2022-04-15 RX ORDER — SODIUM CHLORIDE, SODIUM LACTATE, POTASSIUM CHLORIDE, CALCIUM CHLORIDE 600; 310; 30; 20 MG/100ML; MG/100ML; MG/100ML; MG/100ML
INJECTION, SOLUTION INTRAVENOUS CONTINUOUS
OUTPATIENT
Start: 2022-04-15

## 2022-04-15 RX ADMIN — LIDOCAINE HYDROCHLORIDE 50 MG: 10 INJECTION, SOLUTION EPIDURAL; INFILTRATION; INTRACAUDAL; PERINEURAL at 16:16:00

## 2022-04-15 RX ADMIN — SODIUM CHLORIDE, SODIUM LACTATE, POTASSIUM CHLORIDE, CALCIUM CHLORIDE: 600; 310; 30; 20 INJECTION, SOLUTION INTRAVENOUS at 16:14:00

## 2022-04-15 NOTE — OPERATIVE REPORT
Operative Report-Colonoscopy    PREOPERATIVE DIAGNOSIS/INDICATION:  1. Personal history of colon polyps  2. Change in bowel habits  3. FH of colon CA - brother in 46s  POSTOPERTATIVE DIAGNOSIS:  1. Colon polyp  2. Diverticulosis  3. Internal Hemorrhoids  4. Owego-colonic anastomosis at 15 cm  PROCEDURE PERFORMED: COLONOSCOPY  INFORMED CONSENT:  Once a brief history and physical examination was performed, the risks, benefits and alternatives to the procedure were discussed with the patient and/or family and informed consent was obtained. The risks of sedation, perforation, missed lesions and need for surgery were all discussed. Patient expressed understanding of the risks and agreed to proceed. PROCEDURE DESCRIPTION:The patient was then brought to the endoscopy suite where her pulse, pulse oximetry and blood pressure were monitored. She was placed in the left lateral decubitus position and deep sedation was administered. Once adequate sedation was achieved, a rectal exam was performed which was normal. A lubricated tip of an Olympus video colonoscope was then inserted through the rectum and gently manipulated under direct visualization to the cecal pole and the terminal ileum. The quality of the preparation was good. Upon withdrawal of the colonoscope, careful visualization of the mucosa was performed. Bernard Prep Score: Right Colon 3 Transverse colon 3 Left colon 3  FINDINGS/THERAPEUTICS:  - TERMINAL ILEUM: Normal to the extent examined  - COLON: 3 mm sessile polyp of the transverse colon removed by cold snare, polyp not retrieved. Scattered diverticuli of the left colon. Patent colo-colonic anastomosis at 15 cm from the anal verge, appeared healthy, no ulceration.   - RECTUM: Internal hemorrhoids.   RECOMMENDATIONS:   - Post Colonoscopy/polypectomy precautions, watch for bleeding, infection, perforation, adverse drug reactions   - Repeat colonoscopy in 5 years.  CC Report:   Jonas James MD  4/15/2022  4:48 PM  Basia Mora MD

## 2022-04-15 NOTE — OPERATIVE REPORT
Operative Report-Esophagogastroduodenoscopy with biopsy      PREOPERATIVE DIAGNOSIS/INDICATION:  1. Chronic heartburn  POSTOPERTATIVE DIAGNOSIS:  1. Hiatal hernia - 4 cm- Hill Grade C  2. Gastritis  PROCEDURE PERFORMED: EGD with biopsy  INFORMED CONSENT: Once a brief history and physical examination was performed, the risks, benefits and alternatives to the procedure were discussed with the patient and/or family and informed consent was obtained. The risks of sedation, perforation, missed lesions and need for surgery were all discussed. Patient expressed understanding of the risks and agreed to proceed. PROCEDURE DESCRIPTION:  The patient was then brought to the endoscopy suite where his/her pulse, pulse oximetry and blood pressure were monitored. He/she was placed in the left lateral decubitus position and deep sedation was administered. Once adequate sedation was achieved, a bite block was placed and a lubricated tip of an Olympus video upper endoscope was inserted through the oropharynx and gently manipulated through the esophagus into the stomach and the distal duodenum. Upon withdrawal of the endoscope, careful visualization of the mucosa was performed. FINDINGS:  - ESOPHAGUS: Tortuous in course, no mucosal erosions, ulcers  - EGJ: Located at 33 cm, diaphragmatic impression at 37 cm, 4 cm hiatal hernia  - STOMACH: Moderate mucosal erythema throughout, no ulcers, erosions, masses  - DUODENUM: Normal to the extent examined  THERAPEUTICS: Biopsies were performed duodenum, gastric antrum and body  RECOMMENDATIONS:   - Post EGD precautions, watch for bleeding, infection, perforation, adverse drug reactions   - Follow biopsies.  - Continue PPI daily  - Antii reflux measures.   CC Report:     Dee Munguia MD  4/15/2022  4:26 PM  Cyndee Emanuel MD

## 2022-04-15 NOTE — ANESTHESIA POSTPROCEDURE EVALUATION
[facility] Patient Status:  Hospital Outpatient Surgery   Age/Gender 66year old female MRN [id number]   Location [address] [address] Attending No att. providers found   [address] Day # 0 PCP Jaquelin Lu MD       Anesthesia Post-op Note    ESOPHAGOGASTRODUODENOSCOPY (EGD) with biopsies, COLONOSCOPY with cold snare polypectomy    Procedure Summary     Date: 04/15/22 Room / Location: [facility] ENDOSCOPY 02 / [facility] ENDOSCOPY    Anesthesia Start: 3665 Anesthesia Stop: 4034    Procedures:       ESOPHAGOGASTRODUODENOSCOPY (EGD) with biopsies, COLONOSCOPY with cold snare polypectomy (N/A )      COLONOSCOPY/ESOPHAGOGASTRODUODENOSCOPY (EGD) (N/A ) Diagnosis:       Change in bowel habits      Chronic heartburn      LLQ pain      Family history of colon cancer      (egd: gastritis, hiatal hernia colon: polyps, diverticulosis)    Surgeons: Yomi Fofana MD Anesthesiologist: Fleming Bumpers, MD    Anesthesia Type: general ASA Status: 2          Anesthesia Type: general    Vitals Value Taken Time   /69 04/15/22 1721   Temp 97.9 04/15/22 1835   Pulse 73 04/15/22 1725   Resp 12 04/15/22 1835   SpO2 98 % 04/15/22 1725   Vitals shown include unvalidated device data. Patient Location: PACU    Anesthesia Type: general    Airway Patency: patent    Postop Pain Control: adequate    Mental Status: mildly sedated but able to meaningfully participate in the post-anesthesia evaluation    Nausea/Vomiting: none    Cardiopulmonary/Hydration status: stable euvolemic    Complications: no apparent anesthesia related complications    Postop vital signs: stable    Dental Exam: Unchanged from Preop    Patient to be discharged home when criteria met.

## 2022-04-25 NOTE — PROGRESS NOTES
Results reviewed. Biopsies negative for H.pylori, celiac disease.  Letter or [facility] sent

## 2022-05-23 ENCOUNTER — TELEPHONE (OUTPATIENT)
Dept: FAMILY MEDICINE CLINIC | Facility: CLINIC | Age: 72
End: 2022-05-23

## 2022-05-23 PROBLEM — K44.9 HIATAL HERNIA: Status: ACTIVE | Noted: 2022-05-23

## 2022-05-23 PROBLEM — D50.9 IRON DEFICIENCY ANEMIA: Status: ACTIVE | Noted: 2022-05-23

## 2022-05-23 PROBLEM — R10.84 GENERALIZED ABDOMINAL PAIN: Status: ACTIVE | Noted: 2022-05-23

## 2022-05-23 PROBLEM — K21.9 GASTROESOPHAGEAL REFLUX DISEASE WITHOUT ESOPHAGITIS: Status: ACTIVE | Noted: 2022-05-23

## 2022-05-23 NOTE — TELEPHONE ENCOUNTER
Spoke to patient. Advised to keep appt with OB and CT of the abdomen and we will ask Dr. Kamala Vee for her recommendations when we have more information. Patient voiced understanding and agreement.

## 2022-05-23 NOTE — TELEPHONE ENCOUNTER
Pt called stating the following:      Recently saw Oneal Ortiz - she said she feels as he is passing her off to someone else. She is seeing OB-GYN 5-27 & a CT on 5-27. Gastro did tell her to see PCP. Pt asking for direction. Does not think seeing Dr Alex Mohamud is going to do her any good. She will only tell her to see someone else. Said she's been seeing multiple other dr's. Please advise.

## 2022-05-24 ENCOUNTER — LAB ENCOUNTER (OUTPATIENT)
Dept: LAB | Facility: HOSPITAL | Age: 72
End: 2022-05-24
Attending: INTERNAL MEDICINE
Payer: MEDICARE

## 2022-05-24 DIAGNOSIS — D50.9 IRON DEFICIENCY ANEMIA, UNSPECIFIED IRON DEFICIENCY ANEMIA TYPE: ICD-10-CM

## 2022-05-24 DIAGNOSIS — R10.84 GENERALIZED ABDOMINAL PAIN: ICD-10-CM

## 2022-05-24 LAB
BASOPHILS # BLD AUTO: 0.03 X10(3) UL (ref 0–0.2)
BASOPHILS NFR BLD AUTO: 0.6 %
EOSINOPHIL # BLD AUTO: 0.06 X10(3) UL (ref 0–0.7)
EOSINOPHIL NFR BLD AUTO: 1.1 %
ERYTHROCYTE [DISTWIDTH] IN BLOOD BY AUTOMATED COUNT: 13.1 %
HCT VFR BLD AUTO: 45.1 %
HGB BLD-MCNC: 14.6 G/DL
IMM GRANULOCYTES # BLD AUTO: 0.03 X10(3) UL (ref 0–1)
IMM GRANULOCYTES NFR BLD: 0.6 %
LYMPHOCYTES # BLD AUTO: 1.8 X10(3) UL (ref 1–4)
LYMPHOCYTES NFR BLD AUTO: 34.2 %
MCH RBC QN AUTO: 29.1 PG (ref 26–34)
MCHC RBC AUTO-ENTMCNC: 32.4 G/DL (ref 31–37)
MCV RBC AUTO: 90 FL
MONOCYTES # BLD AUTO: 0.48 X10(3) UL (ref 0.1–1)
MONOCYTES NFR BLD AUTO: 9.1 %
NEUTROPHILS # BLD AUTO: 2.86 X10 (3) UL (ref 1.5–7.7)
NEUTROPHILS # BLD AUTO: 2.86 X10(3) UL (ref 1.5–7.7)
NEUTROPHILS NFR BLD AUTO: 54.4 %
PLATELET # BLD AUTO: 240 10(3)UL (ref 150–450)
RBC # BLD AUTO: 5.01 X10(6)UL
WBC # BLD AUTO: 5.3 X10(3) UL (ref 4–11)

## 2022-05-24 PROCEDURE — 36415 COLL VENOUS BLD VENIPUNCTURE: CPT

## 2022-05-24 PROCEDURE — 85025 COMPLETE CBC W/AUTO DIFF WBC: CPT

## 2022-05-25 ENCOUNTER — OFFICE VISIT (OUTPATIENT)
Dept: OBGYN CLINIC | Facility: CLINIC | Age: 72
End: 2022-05-25
Payer: COMMERCIAL

## 2022-05-25 VITALS
DIASTOLIC BLOOD PRESSURE: 76 MMHG | HEIGHT: 67 IN | HEART RATE: 80 BPM | SYSTOLIC BLOOD PRESSURE: 130 MMHG | BODY MASS INDEX: 29.95 KG/M2 | WEIGHT: 190.81 LBS

## 2022-05-25 DIAGNOSIS — R93.89 THICKENED ENDOMETRIUM: Primary | ICD-10-CM

## 2022-05-25 DIAGNOSIS — R39.15 URINARY URGENCY: ICD-10-CM

## 2022-05-25 PROCEDURE — 3008F BODY MASS INDEX DOCD: CPT | Performed by: OBSTETRICS & GYNECOLOGY

## 2022-05-25 PROCEDURE — 3075F SYST BP GE 130 - 139MM HG: CPT | Performed by: OBSTETRICS & GYNECOLOGY

## 2022-05-25 PROCEDURE — 99214 OFFICE O/P EST MOD 30 MIN: CPT | Performed by: OBSTETRICS & GYNECOLOGY

## 2022-05-25 PROCEDURE — 3078F DIAST BP <80 MM HG: CPT | Performed by: OBSTETRICS & GYNECOLOGY

## 2022-05-27 ENCOUNTER — HOSPITAL ENCOUNTER (OUTPATIENT)
Dept: CT IMAGING | Facility: HOSPITAL | Age: 72
Discharge: HOME | End: 2022-05-27
Attending: INTERNAL MEDICINE
Payer: MEDICARE

## 2022-05-27 DIAGNOSIS — R10.84 GENERALIZED ABDOMINAL PAIN: ICD-10-CM

## 2022-05-27 LAB — CREAT BLD-MCNC: 0.8 MG/DL

## 2022-05-27 PROCEDURE — 82565 ASSAY OF CREATININE: CPT

## 2022-05-27 PROCEDURE — 74177 CT ABD & PELVIS W/CONTRAST: CPT | Performed by: INTERNAL MEDICINE

## 2022-05-27 RX ORDER — IOHEXOL 350 MG/ML
100 INJECTION, SOLUTION INTRAVENOUS
Status: COMPLETED | OUTPATIENT
Start: 2022-05-27 | End: 2022-05-27

## 2022-05-27 RX ADMIN — IOHEXOL 100 ML: 350 INJECTION, SOLUTION INTRAVENOUS at 12:17:00

## 2022-06-01 NOTE — TELEPHONE ENCOUNTER
Last seen in November, I see that she saw gastro and gyn, recommend that she go ahead with getting the CT and can schedule a follow up with me if she would like to discuss her symptoms further.

## 2022-06-07 ENCOUNTER — HOSPITAL ENCOUNTER (OUTPATIENT)
Dept: ULTRASOUND IMAGING | Age: 72
Discharge: HOME | End: 2022-06-07
Attending: OBSTETRICS & GYNECOLOGY
Payer: MEDICARE

## 2022-06-07 DIAGNOSIS — R93.89 THICKENED ENDOMETRIUM: ICD-10-CM

## 2022-06-07 PROCEDURE — 93975 VASCULAR STUDY: CPT | Performed by: OBSTETRICS & GYNECOLOGY

## 2022-06-07 PROCEDURE — 76830 TRANSVAGINAL US NON-OB: CPT | Performed by: OBSTETRICS & GYNECOLOGY

## 2022-06-07 PROCEDURE — 76856 US EXAM PELVIC COMPLETE: CPT | Performed by: OBSTETRICS & GYNECOLOGY

## 2022-06-08 ENCOUNTER — TELEPHONE (OUTPATIENT)
Dept: OBGYN CLINIC | Facility: CLINIC | Age: 72
End: 2022-06-08

## 2022-06-08 DIAGNOSIS — R93.89 THICKENED ENDOMETRIUM: Primary | ICD-10-CM

## 2022-06-08 NOTE — TELEPHONE ENCOUNTER
Patient states that she had an [address] in our office. She is unable to see US results in her mychart. She was told that she should be able to see them.

## 2022-06-09 NOTE — TELEPHONE ENCOUNTER
Contacted patient. Briefly reviewed results and briefly discussed possibility of EMB and discussed that procedure briefly as well. Advised that Dr. Selena Rudd has yet to review results because we just received them today, but he is in office today. Will send a message to him and will call her back with more information as soon as he responds. Let her know that call back may be in the morning. She states understanding and agrees with plan.

## 2022-06-10 NOTE — TELEPHONE ENCOUNTER
Surgeon: Dr. FRANKS [facility]   Assistant: none     Type of Admit: Hospital outpatient, does not require admission following surgery   Procedure Location: Main OR    PreOp Dx: thickened endometrium     Procedure: Dilation, hysteroscopy, hysteroscopic endometrial sampling and curettage     Anesthesia: MAC    Special Equipment/Comments: Taya Quick hysteroscopy with incisor      Antibiotics: None  Pre Op Orders: initiate my Pre-op standing orders, if none exist please use [facility]   Labs: none    Medical clearance: none

## 2022-06-10 NOTE — TELEPHONE ENCOUNTER
Surgery scheduled for 6/11/22 at 12:30  Post op   Future Appointments   Date Time Provider Jaylon Knutson   6/30/2022  2:30 PM Johnie Stafford MD [facility] P [facility] [address]     Orders entered  Added to calendar   PA - submitted online  Approved - [id number]

## 2022-06-11 ENCOUNTER — HOSPITAL ENCOUNTER (OUTPATIENT)
Facility: HOSPITAL | Age: 72
Setting detail: HOSPITAL OUTPATIENT SURGERY
Discharge: HOME | End: 2022-06-11
Attending: OBSTETRICS & GYNECOLOGY | Admitting: OBSTETRICS & GYNECOLOGY
Payer: MEDICARE

## 2022-06-11 ENCOUNTER — ANESTHESIA EVENT (OUTPATIENT)
Dept: SURGERY | Facility: HOSPITAL | Age: 72
End: 2022-06-11
Payer: MEDICARE

## 2022-06-11 ENCOUNTER — ANESTHESIA (OUTPATIENT)
Dept: SURGERY | Facility: HOSPITAL | Age: 72
End: 2022-06-11
Payer: MEDICARE

## 2022-06-11 VITALS
BODY MASS INDEX: 29.27 KG/M2 | HEIGHT: 67.5 IN | RESPIRATION RATE: 16 BRPM | OXYGEN SATURATION: 99 % | TEMPERATURE: 98 F | WEIGHT: 188.69 LBS | HEART RATE: 79 BPM | DIASTOLIC BLOOD PRESSURE: 64 MMHG | SYSTOLIC BLOOD PRESSURE: 109 MMHG

## 2022-06-11 DIAGNOSIS — R93.89 THICKENED ENDOMETRIUM: ICD-10-CM

## 2022-06-11 LAB — SARS-COV-2 RNA RESP QL NAA+PROBE: NOT DETECTED

## 2022-06-11 PROCEDURE — 0UB98ZX EXCISION OF UTERUS, VIA NATURAL OR ARTIFICIAL OPENING ENDOSCOPIC, DIAGNOSTIC: ICD-10-PCS | Performed by: OBSTETRICS & GYNECOLOGY

## 2022-06-11 PROCEDURE — 0UDB7ZX EXTRACTION OF ENDOMETRIUM, VIA NATURAL OR ARTIFICIAL OPENING, DIAGNOSTIC: ICD-10-PCS | Performed by: OBSTETRICS & GYNECOLOGY

## 2022-06-11 PROCEDURE — 58561 HYSTEROSCOPY REMOVE MYOMA: CPT | Performed by: OBSTETRICS & GYNECOLOGY

## 2022-06-11 RX ORDER — HYDROMORPHONE HYDROCHLORIDE 1 MG/ML
0.4 INJECTION, SOLUTION INTRAMUSCULAR; INTRAVENOUS; SUBCUTANEOUS EVERY 5 MIN PRN
Status: DISCONTINUED | OUTPATIENT
Start: 2022-06-11 | End: 2022-06-11

## 2022-06-11 RX ORDER — ACETAMINOPHEN 500 MG
1000 TABLET ORAL ONCE
Status: DISCONTINUED | OUTPATIENT
Start: 2022-06-11 | End: 2022-06-11

## 2022-06-11 RX ORDER — SODIUM CHLORIDE, SODIUM LACTATE, POTASSIUM CHLORIDE, CALCIUM CHLORIDE 600; 310; 30; 20 MG/100ML; MG/100ML; MG/100ML; MG/100ML
INJECTION, SOLUTION INTRAVENOUS CONTINUOUS
Status: DISCONTINUED | OUTPATIENT
Start: 2022-06-11 | End: 2022-06-11

## 2022-06-11 RX ORDER — NALOXONE HYDROCHLORIDE 0.4 MG/ML
80 INJECTION, SOLUTION INTRAMUSCULAR; INTRAVENOUS; SUBCUTANEOUS AS NEEDED
Status: DISCONTINUED | OUTPATIENT
Start: 2022-06-11 | End: 2022-06-11

## 2022-06-11 RX ORDER — DIPHENHYDRAMINE HYDROCHLORIDE 50 MG/ML
12.5 INJECTION INTRAMUSCULAR; INTRAVENOUS AS NEEDED
Status: DISCONTINUED | OUTPATIENT
Start: 2022-06-11 | End: 2022-06-11

## 2022-06-11 RX ORDER — SODIUM CHLORIDE, SODIUM LACTATE, POTASSIUM CHLORIDE, CALCIUM CHLORIDE 600; 310; 30; 20 MG/100ML; MG/100ML; MG/100ML; MG/100ML
INJECTION, SOLUTION INTRAVENOUS CONTINUOUS PRN
Status: DISCONTINUED | OUTPATIENT
Start: 2022-06-11 | End: 2022-06-11 | Stop reason: SURG

## 2022-06-11 RX ORDER — ACETAMINOPHEN 500 MG
1000 TABLET ORAL ONCE AS NEEDED
Status: DISCONTINUED | OUTPATIENT
Start: 2022-06-11 | End: 2022-06-11

## 2022-06-11 RX ORDER — LIDOCAINE HYDROCHLORIDE 10 MG/ML
INJECTION, SOLUTION EPIDURAL; INFILTRATION; INTRACAUDAL; PERINEURAL AS NEEDED
Status: DISCONTINUED | OUTPATIENT
Start: 2022-06-11 | End: 2022-06-11 | Stop reason: SURG

## 2022-06-11 RX ORDER — HYDROCODONE BITARTRATE AND ACETAMINOPHEN 5; 325 MG/1; MG/1
2 TABLET ORAL ONCE AS NEEDED
Status: DISCONTINUED | OUTPATIENT
Start: 2022-06-11 | End: 2022-06-11

## 2022-06-11 RX ORDER — HYDROCODONE BITARTRATE AND ACETAMINOPHEN 5; 325 MG/1; MG/1
1 TABLET ORAL ONCE AS NEEDED
Status: DISCONTINUED | OUTPATIENT
Start: 2022-06-11 | End: 2022-06-11

## 2022-06-11 RX ORDER — MIDAZOLAM HYDROCHLORIDE 1 MG/ML
1 INJECTION INTRAMUSCULAR; INTRAVENOUS EVERY 5 MIN PRN
Status: DISCONTINUED | OUTPATIENT
Start: 2022-06-11 | End: 2022-06-11

## 2022-06-11 RX ORDER — MEPERIDINE HYDROCHLORIDE 25 MG/ML
12.5 INJECTION INTRAMUSCULAR; INTRAVENOUS; SUBCUTANEOUS AS NEEDED
Status: DISCONTINUED | OUTPATIENT
Start: 2022-06-11 | End: 2022-06-11

## 2022-06-11 RX ORDER — ONDANSETRON 2 MG/ML
4 INJECTION INTRAMUSCULAR; INTRAVENOUS EVERY 6 HOURS PRN
Status: DISCONTINUED | OUTPATIENT
Start: 2022-06-11 | End: 2022-06-11

## 2022-06-11 RX ORDER — HYDROMORPHONE HYDROCHLORIDE 1 MG/ML
0.6 INJECTION, SOLUTION INTRAMUSCULAR; INTRAVENOUS; SUBCUTANEOUS EVERY 5 MIN PRN
Status: DISCONTINUED | OUTPATIENT
Start: 2022-06-11 | End: 2022-06-11

## 2022-06-11 RX ORDER — KETOROLAC TROMETHAMINE 30 MG/ML
INJECTION, SOLUTION INTRAMUSCULAR; INTRAVENOUS AS NEEDED
Status: DISCONTINUED | OUTPATIENT
Start: 2022-06-11 | End: 2022-06-11

## 2022-06-11 RX ORDER — METOCLOPRAMIDE HYDROCHLORIDE 5 MG/ML
10 INJECTION INTRAMUSCULAR; INTRAVENOUS EVERY 8 HOURS PRN
Status: DISCONTINUED | OUTPATIENT
Start: 2022-06-11 | End: 2022-06-11

## 2022-06-11 RX ORDER — HYDROMORPHONE HYDROCHLORIDE 1 MG/ML
0.2 INJECTION, SOLUTION INTRAMUSCULAR; INTRAVENOUS; SUBCUTANEOUS EVERY 5 MIN PRN
Status: DISCONTINUED | OUTPATIENT
Start: 2022-06-11 | End: 2022-06-11

## 2022-06-11 RX ORDER — KETOROLAC TROMETHAMINE 30 MG/ML
INJECTION, SOLUTION INTRAMUSCULAR; INTRAVENOUS AS NEEDED
Status: DISCONTINUED | OUTPATIENT
Start: 2022-06-11 | End: 2022-06-11 | Stop reason: SURG

## 2022-06-11 RX ORDER — IBUPROFEN 600 MG/1
600 TABLET ORAL EVERY 6 HOURS PRN
Qty: 30 TABLET | Refills: 0 | Status: SHIPPED | OUTPATIENT
Start: 2022-06-11

## 2022-06-11 RX ADMIN — SODIUM CHLORIDE, SODIUM LACTATE, POTASSIUM CHLORIDE, CALCIUM CHLORIDE: 600; 310; 30; 20 INJECTION, SOLUTION INTRAVENOUS at 14:12:00

## 2022-06-11 RX ADMIN — LIDOCAINE HYDROCHLORIDE 50 MG: 10 INJECTION, SOLUTION EPIDURAL; INFILTRATION; INTRACAUDAL; PERINEURAL at 13:08:00

## 2022-06-11 RX ADMIN — SODIUM CHLORIDE, SODIUM LACTATE, POTASSIUM CHLORIDE, CALCIUM CHLORIDE: 600; 310; 30; 20 INJECTION, SOLUTION INTRAVENOUS at 13:03:00

## 2022-06-11 RX ADMIN — KETOROLAC TROMETHAMINE 30 MG: 30 INJECTION, SOLUTION INTRAMUSCULAR; INTRAVENOUS at 14:00:00

## 2022-06-11 NOTE — ANESTHESIA POSTPROCEDURE EVALUATION
[facility]    Orlando Mak Patient Status:  Hospital Outpatient Surgery   Age/Gender 66year old female MRN [id number]   [facility] SURGERY Attending Zachary Flores MD   Hosp Day # 0 PCP Augustus Ruiz MD       Anesthesia Post-op Note    TRUCLEAR HYSTEROSCOPY, hysteroscopic endometrial sampling and curettage, polypectomy    Procedure Summary     Date: 06/11/22 Room / Location: [address] [place] [address] OR 08 / [facility] OR    Anesthesia Start: 0721 Anesthesia Stop:     Procedure: Anastasiia Patel HYSTEROSCOPY, hysteroscopic endometrial sampling and curettage, polypectomy (N/A Vagina ) Diagnosis: Thickened endometrium      (thickened endometrium and endometrial polyp)    Surgeons: Zachary Flores MD Anesthesiologist: Shakeel Da Silva MD    Anesthesia Type: MAC, general ASA Status: 2          Anesthesia Type: MAC, general    Vitals Value Taken Time   /63 06/11/22 1413   Temp 98.0 06/11/22 1413   Pulse 79 06/11/22 1413   Resp 16 06/11/22 1413   SpO2 95 06/11/22 1413           Anesthesia Type: MAC    Airway Patency: patent and extubated    Postop Pain Control: adequate    Mental Status: mildly sedated but able to meaningfully participate in the post-anesthesia evaluation    Nausea/Vomiting: none    Cardiopulmonary/Hydration status: stable euvolemic    Complications: no apparent anesthesia related complications    Postop vital signs: stable    Dental Exam: Unchanged from Preop    Patient to be discharged home when criteria met.

## 2022-06-11 NOTE — BRIEF OP NOTE
Pre-Operative Diagnosis: Thickened endometrium [R93.89]     Post-Operative Diagnosis: Thickened endometrium   Endometrial polyp   Submucosal Leiomyoma      Procedure Performed:   Doneta April HYSTEROSCOPY, hysteroscopic endometrial sampling and curettage, polypectomy    Surgeon(s) and Role:     * Opal Peterson MD - Primary     Surgical Findings:   Normal external genitalia. Normal cervix. 9 cm uterus, anteverted. Large mass in endometrial cavity with a narrow base in posterior uterine wall, resembling a polyp. Mass is approximately 3 cm in size. A 5 mm submucosal, FIGO 1, calcified fibroid on left uterine wall.         Specimen:   Polyp      Estimated Blood Loss: 5 ml       [facility], MD  6/11/2022  7:05 PM

## 2022-07-07 ENCOUNTER — TELEPHONE (OUTPATIENT)
Dept: FAMILY MEDICINE CLINIC | Facility: CLINIC | Age: 72
End: 2022-07-07

## 2022-07-07 ENCOUNTER — OFFICE VISIT (OUTPATIENT)
Dept: OBGYN CLINIC | Facility: CLINIC | Age: 72
End: 2022-07-07
Payer: COMMERCIAL

## 2022-07-07 ENCOUNTER — MED REC SCAN ONLY (OUTPATIENT)
Dept: FAMILY MEDICINE CLINIC | Facility: CLINIC | Age: 72
End: 2022-07-07

## 2022-07-07 VITALS
WEIGHT: 187.63 LBS | HEART RATE: 100 BPM | SYSTOLIC BLOOD PRESSURE: 128 MMHG | DIASTOLIC BLOOD PRESSURE: 78 MMHG | HEIGHT: 67 IN | BODY MASS INDEX: 29.45 KG/M2

## 2022-07-07 DIAGNOSIS — Z12.31 ENCOUNTER FOR SCREENING MAMMOGRAM FOR MALIGNANT NEOPLASM OF BREAST: Primary | ICD-10-CM

## 2022-07-07 DIAGNOSIS — R32 URINARY INCONTINENCE, UNSPECIFIED TYPE: Primary | ICD-10-CM

## 2022-07-07 PROCEDURE — 3074F SYST BP LT 130 MM HG: CPT | Performed by: OBSTETRICS & GYNECOLOGY

## 2022-07-07 PROCEDURE — 99213 OFFICE O/P EST LOW 20 MIN: CPT | Performed by: OBSTETRICS & GYNECOLOGY

## 2022-07-07 PROCEDURE — 3008F BODY MASS INDEX DOCD: CPT | Performed by: OBSTETRICS & GYNECOLOGY

## 2022-07-07 PROCEDURE — 3078F DIAST BP <80 MM HG: CPT | Performed by: OBSTETRICS & GYNECOLOGY

## 2022-07-07 RX ORDER — IBUPROFEN AND FAMOTIDINE 26.6; 8 MG/1; MG/1
1 TABLET, FILM COATED ORAL 2 TIMES DAILY
COMMUNITY
Start: 2022-07-06

## 2022-07-07 RX ORDER — METHYLPREDNISOLONE 4 MG/1
TABLET ORAL
COMMUNITY
Start: 2022-07-06

## 2022-07-07 NOTE — TELEPHONE ENCOUNTER
Pt calling wanting to schedule her mammogram. Asked for orders to be placed. Pt is currently looking at schedule to schedule her MAPS.  Will schedule when we call her back, please advise

## 2022-07-07 NOTE — TELEPHONE ENCOUNTER
LOV 11/2021    Last mammo 7/8/21    Mammogram ordered per protocol. Front- please call pt back to schedule MAPS. Thank you.

## 2022-07-15 ENCOUNTER — HOSPITAL ENCOUNTER (OUTPATIENT)
Dept: MAMMOGRAPHY | Age: 72
Discharge: HOME | End: 2022-07-15
Attending: FAMILY MEDICINE
Payer: MEDICARE

## 2022-07-15 DIAGNOSIS — Z12.31 ENCOUNTER FOR SCREENING MAMMOGRAM FOR MALIGNANT NEOPLASM OF BREAST: ICD-10-CM

## 2022-07-15 PROCEDURE — 77067 SCR MAMMO BI INCL CAD: CPT | Performed by: FAMILY MEDICINE

## 2022-07-15 PROCEDURE — 77063 BREAST TOMOSYNTHESIS BI: CPT | Performed by: FAMILY MEDICINE

## 2022-09-20 ENCOUNTER — OFFICE VISIT (OUTPATIENT)
Dept: UROLOGY | Facility: CLINIC | Age: 72
End: 2022-09-20
Attending: OBSTETRICS & GYNECOLOGY

## 2022-09-20 VITALS — WEIGHT: 187 LBS | HEIGHT: 67 IN | BODY MASS INDEX: 29.35 KG/M2 | TEMPERATURE: 98 F

## 2022-09-20 DIAGNOSIS — N39.41 URGE INCONTINENCE: ICD-10-CM

## 2022-09-20 DIAGNOSIS — N81.11 CYSTOCELE, MIDLINE: ICD-10-CM

## 2022-09-20 DIAGNOSIS — N39.3 FEMALE STRESS INCONTINENCE: ICD-10-CM

## 2022-09-20 DIAGNOSIS — K59.00 CONSTIPATION, UNSPECIFIED CONSTIPATION TYPE: ICD-10-CM

## 2022-09-20 DIAGNOSIS — N81.84 PELVIC MUSCLE WASTING: ICD-10-CM

## 2022-09-20 DIAGNOSIS — R35.1 NOCTURIA: Primary | ICD-10-CM

## 2022-09-20 LAB
BLOOD URINE: NEGATIVE
CONTROL RUN WITHIN 24 HOURS?: YES
LEUKOCYTE ESTERASE URINE: NEGATIVE
NITRITE URINE: NEGATIVE

## 2022-09-20 PROCEDURE — 87086 URINE CULTURE/COLONY COUNT: CPT | Performed by: OBSTETRICS & GYNECOLOGY

## 2022-09-20 PROCEDURE — 81002 URINALYSIS NONAUTO W/O SCOPE: CPT | Performed by: OBSTETRICS & GYNECOLOGY

## 2022-09-20 PROCEDURE — 99212 OFFICE O/P EST SF 10 MIN: CPT

## 2022-10-18 ENCOUNTER — OFFICE VISIT (OUTPATIENT)
Dept: FAMILY MEDICINE CLINIC | Facility: CLINIC | Age: 72
End: 2022-10-18
Payer: COMMERCIAL

## 2022-10-18 VITALS
DIASTOLIC BLOOD PRESSURE: 82 MMHG | HEIGHT: 67 IN | WEIGHT: 183 LBS | TEMPERATURE: 98 F | HEART RATE: 80 BPM | BODY MASS INDEX: 28.72 KG/M2 | SYSTOLIC BLOOD PRESSURE: 118 MMHG | OXYGEN SATURATION: 98 % | RESPIRATION RATE: 18 BRPM

## 2022-10-18 DIAGNOSIS — K21.9 GASTROESOPHAGEAL REFLUX DISEASE WITHOUT ESOPHAGITIS: ICD-10-CM

## 2022-10-18 DIAGNOSIS — E78.00 PURE HYPERCHOLESTEROLEMIA: ICD-10-CM

## 2022-10-18 DIAGNOSIS — K44.9 HIATAL HERNIA: ICD-10-CM

## 2022-10-18 DIAGNOSIS — D17.20 LIPOMA OF UPPER EXTREMITY, UNSPECIFIED LATERALITY: ICD-10-CM

## 2022-10-18 DIAGNOSIS — Z13.29 SCREENING FOR THYROID DISORDER: ICD-10-CM

## 2022-10-18 DIAGNOSIS — E01.0 THYROMEGALY: ICD-10-CM

## 2022-10-18 DIAGNOSIS — R39.15 URINARY URGENCY: ICD-10-CM

## 2022-10-18 DIAGNOSIS — Z13.6 SCREENING FOR CARDIOVASCULAR CONDITION: ICD-10-CM

## 2022-10-18 DIAGNOSIS — M17.11 ARTHRITIS OF KNEE, RIGHT: ICD-10-CM

## 2022-10-18 DIAGNOSIS — N39.46 MIXED INCONTINENCE: ICD-10-CM

## 2022-10-18 DIAGNOSIS — M85.80 OSTEOPENIA, UNSPECIFIED LOCATION: ICD-10-CM

## 2022-10-18 DIAGNOSIS — M43.16 SPONDYLOLISTHESIS OF LUMBAR REGION: ICD-10-CM

## 2022-10-18 DIAGNOSIS — Z78.0 POSTMENOPAUSAL: ICD-10-CM

## 2022-10-18 DIAGNOSIS — Z00.00 ENCOUNTER FOR ANNUAL HEALTH EXAMINATION: Primary | ICD-10-CM

## 2022-10-18 DIAGNOSIS — Z13.0 SCREENING FOR DEFICIENCY ANEMIA: ICD-10-CM

## 2022-10-18 PROBLEM — D50.9 IRON DEFICIENCY ANEMIA: Status: RESOLVED | Noted: 2022-05-23 | Resolved: 2022-10-18

## 2022-10-18 PROBLEM — R10.84 GENERALIZED ABDOMINAL PAIN: Status: RESOLVED | Noted: 2022-05-23 | Resolved: 2022-10-18

## 2022-10-18 PROBLEM — R93.89 THICKENED ENDOMETRIUM: Status: RESOLVED | Noted: 2021-04-09 | Resolved: 2022-10-18

## 2022-10-18 PROCEDURE — G0439 PPPS, SUBSEQ VISIT: HCPCS | Performed by: FAMILY MEDICINE

## 2022-10-18 PROCEDURE — 96160 PT-FOCUSED HLTH RISK ASSMT: CPT | Performed by: FAMILY MEDICINE

## 2022-10-18 PROCEDURE — 3079F DIAST BP 80-89 MM HG: CPT | Performed by: FAMILY MEDICINE

## 2022-10-18 PROCEDURE — 3074F SYST BP LT 130 MM HG: CPT | Performed by: FAMILY MEDICINE

## 2022-10-18 PROCEDURE — 3008F BODY MASS INDEX DOCD: CPT | Performed by: FAMILY MEDICINE

## 2022-10-18 PROCEDURE — 99397 PER PM REEVAL EST PAT 65+ YR: CPT | Performed by: FAMILY MEDICINE

## 2022-10-18 PROCEDURE — 1125F AMNT PAIN NOTED PAIN PRSNT: CPT | Performed by: FAMILY MEDICINE

## 2022-10-24 ENCOUNTER — LABORATORY ENCOUNTER (OUTPATIENT)
Dept: LAB | Age: 72
End: 2022-10-24
Attending: FAMILY MEDICINE
Payer: MEDICARE

## 2022-10-24 ENCOUNTER — HOSPITAL ENCOUNTER (OUTPATIENT)
Dept: BONE DENSITY | Age: 72
Discharge: HOME | End: 2022-10-24
Attending: FAMILY MEDICINE
Payer: MEDICARE

## 2022-10-24 DIAGNOSIS — Z13.29 SCREENING FOR THYROID DISORDER: ICD-10-CM

## 2022-10-24 DIAGNOSIS — Z13.6 SCREENING FOR CARDIOVASCULAR CONDITION: ICD-10-CM

## 2022-10-24 DIAGNOSIS — Z13.0 SCREENING FOR DEFICIENCY ANEMIA: ICD-10-CM

## 2022-10-24 DIAGNOSIS — Z78.0 POSTMENOPAUSAL: ICD-10-CM

## 2022-10-24 LAB
ALBUMIN SERPL-MCNC: 3.7 G/DL (ref 3.4–5)
ALBUMIN/GLOB SERPL: 1 {RATIO} (ref 1–2)
ALP LIVER SERPL-CCNC: 67 U/L
ALT SERPL-CCNC: 28 U/L
ANION GAP SERPL CALC-SCNC: 6 MMOL/L (ref 0–18)
AST SERPL-CCNC: 24 U/L (ref 15–37)
BASOPHILS # BLD AUTO: 0.05 X10(3) UL (ref 0–0.2)
BASOPHILS NFR BLD AUTO: 1 %
BILIRUB SERPL-MCNC: 0.6 MG/DL (ref 0.1–2)
BUN BLD-MCNC: 11 MG/DL (ref 7–18)
CALCIUM BLD-MCNC: 9.8 MG/DL (ref 8.5–10.1)
CHLORIDE SERPL-SCNC: 107 MMOL/L (ref 98–112)
CHOLEST SERPL-MCNC: 254 MG/DL (ref ?–200)
CO2 SERPL-SCNC: 29 MMOL/L (ref 21–32)
CREAT BLD-MCNC: 0.86 MG/DL
EOSINOPHIL # BLD AUTO: 0.13 X10(3) UL (ref 0–0.7)
EOSINOPHIL NFR BLD AUTO: 2.6 %
ERYTHROCYTE [DISTWIDTH] IN BLOOD BY AUTOMATED COUNT: 13.6 %
FASTING PATIENT LIPID ANSWER: YES
FASTING STATUS PATIENT QL REPORTED: YES
GFR SERPLBLD BASED ON 1.73 SQ M-ARVRAT: 71 ML/MIN/1.73M2 (ref 60–?)
GLOBULIN PLAS-MCNC: 3.8 G/DL (ref 2.8–4.4)
GLUCOSE BLD-MCNC: 89 MG/DL (ref 70–99)
HCT VFR BLD AUTO: 47.8 %
HDLC SERPL-MCNC: 74 MG/DL (ref 40–59)
HGB BLD-MCNC: 15 G/DL
IMM GRANULOCYTES # BLD AUTO: 0.01 X10(3) UL (ref 0–1)
IMM GRANULOCYTES NFR BLD: 0.2 %
LDLC SERPL CALC-MCNC: 142 MG/DL (ref ?–100)
LYMPHOCYTES # BLD AUTO: 2.38 X10(3) UL (ref 1–4)
LYMPHOCYTES NFR BLD AUTO: 47.1 %
MCH RBC QN AUTO: 30.3 PG (ref 26–34)
MCHC RBC AUTO-ENTMCNC: 31.4 G/DL (ref 31–37)
MCV RBC AUTO: 96.6 FL
MONOCYTES # BLD AUTO: 0.45 X10(3) UL (ref 0.1–1)
MONOCYTES NFR BLD AUTO: 8.9 %
NEUTROPHILS # BLD AUTO: 2.03 X10 (3) UL (ref 1.5–7.7)
NEUTROPHILS # BLD AUTO: 2.03 X10(3) UL (ref 1.5–7.7)
NEUTROPHILS NFR BLD AUTO: 40.2 %
NONHDLC SERPL-MCNC: 180 MG/DL (ref ?–130)
OSMOLALITY SERPL CALC.SUM OF ELEC: 293 MOSM/KG (ref 275–295)
PLATELET # BLD AUTO: 263 10(3)UL (ref 150–450)
POTASSIUM SERPL-SCNC: 3.8 MMOL/L (ref 3.5–5.1)
PROT SERPL-MCNC: 7.5 G/DL (ref 6.4–8.2)
RBC # BLD AUTO: 4.95 X10(6)UL
SODIUM SERPL-SCNC: 142 MMOL/L (ref 136–145)
TRIGL SERPL-MCNC: 212 MG/DL (ref 30–149)
TSI SER-ACNC: 1.44 MIU/ML (ref 0.36–3.74)
VLDLC SERPL CALC-MCNC: 40 MG/DL (ref 0–30)
WBC # BLD AUTO: 5.1 X10(3) UL (ref 4–11)

## 2022-10-24 PROCEDURE — 80053 COMPREHEN METABOLIC PANEL: CPT

## 2022-10-24 PROCEDURE — 77080 DXA BONE DENSITY AXIAL: CPT | Performed by: FAMILY MEDICINE

## 2022-10-24 PROCEDURE — 85025 COMPLETE CBC W/AUTO DIFF WBC: CPT

## 2022-10-24 PROCEDURE — 36415 COLL VENOUS BLD VENIPUNCTURE: CPT

## 2022-10-24 PROCEDURE — 84443 ASSAY THYROID STIM HORMONE: CPT

## 2022-10-24 PROCEDURE — 80061 LIPID PANEL: CPT

## 2022-10-27 ENCOUNTER — PATIENT MESSAGE (OUTPATIENT)
Dept: FAMILY MEDICINE CLINIC | Facility: CLINIC | Age: 72
End: 2022-10-27

## 2022-10-27 DIAGNOSIS — E78.2 ELEVATED CHOLESTEROL WITH ELEVATED TRIGLYCERIDES: Primary | ICD-10-CM

## 2022-10-27 NOTE — TELEPHONE ENCOUNTER
From: Sergey Bolden  To: Db Cárdenas MD  Sent: 10/27/2022 4:09 PM CDT  Subject: test results    I would like to wait to start any Statins. I will try and lower through diet and vitamins and maybe test again in 3-6 months if that is possible. I will take the prescription for osteoporosis, please send it to [place] [address]. do I take this for the rest of my life?

## 2022-10-28 RX ORDER — ALENDRONATE SODIUM 70 MG/1
70 TABLET ORAL
Qty: 12 TABLET | Refills: 0 | Status: SHIPPED | OUTPATIENT
Start: 2022-10-28

## 2022-11-09 ENCOUNTER — PATIENT MESSAGE (OUTPATIENT)
Dept: FAMILY MEDICINE CLINIC | Facility: CLINIC | Age: 72
End: 2022-11-09

## 2022-11-09 NOTE — TELEPHONE ENCOUNTER
From: Cheryle Pinna  To: Cecilio Stover MD  Sent: 11/9/2022 12:41 PM CST  Subject: Did you receive my message. Did you receive my message in response to my tests results? I sent it 10/27.

## 2022-11-16 ENCOUNTER — OFFICE VISIT (OUTPATIENT)
Dept: UROLOGY | Facility: CLINIC | Age: 72
End: 2022-11-16
Attending: OBSTETRICS & GYNECOLOGY
Payer: MEDICARE

## 2022-11-16 VITALS — HEIGHT: 67 IN | WEIGHT: 183 LBS | RESPIRATION RATE: 16 BRPM | BODY MASS INDEX: 28.72 KG/M2 | TEMPERATURE: 98 F

## 2022-11-16 DIAGNOSIS — N81.84 PELVIC MUSCLE WASTING: ICD-10-CM

## 2022-11-16 DIAGNOSIS — R35.1 NOCTURIA: Primary | ICD-10-CM

## 2022-11-16 DIAGNOSIS — N39.3 FEMALE STRESS INCONTINENCE: ICD-10-CM

## 2022-11-16 DIAGNOSIS — N95.2 POSTMENOPAUSAL ATROPHIC VAGINITIS: ICD-10-CM

## 2022-11-16 DIAGNOSIS — N39.41 URGE INCONTINENCE: ICD-10-CM

## 2022-11-16 PROCEDURE — 81002 URINALYSIS NONAUTO W/O SCOPE: CPT

## 2022-11-16 PROCEDURE — 51797 INTRAABDOMINAL PRESSURE TEST: CPT

## 2022-11-16 PROCEDURE — 51741 ELECTRO-UROFLOWMETRY FIRST: CPT

## 2022-11-16 PROCEDURE — 51729 CYSTOMETROGRAM W/VP&UP: CPT

## 2022-11-16 PROCEDURE — 51784 ANAL/URINARY MUSCLE STUDY: CPT

## 2022-11-16 RX ORDER — POLYETHYLENE GLYCOL 3350 17 G/17G
17 POWDER, FOR SOLUTION ORAL DAILY
COMMUNITY

## 2022-11-16 RX ORDER — ESTRADIOL 0.1 MG/G
CREAM VAGINAL
Qty: 42.5 G | Refills: 3 | Status: SHIPPED | OUTPATIENT
Start: 2022-11-16

## 2022-11-16 NOTE — PATIENT INSTRUCTIONS
[facility] UROGYNECOLOGY  Elviadavid Landin [address]  [address]: [phone]  FAX: [phone]       Urodynamic Testing Discharge Instructions: There are NO dietary or activity restrictions. You may resume your normal schedule. You may have mild discomfort for a few hours after your testing today. There may be some mild burning when you urinate or you may see some blood in your urine. These problems should not last more than 24 hours. The following suggestions may minimize any symptoms you experience. Drink 6-8 large glasses of water over the next 8 hours  A compress or sitz bath may be soothing  Tylenol or Ibuprofen may be taken as needed    If you experience any of the following, please call the office or, if after hours, the on-call physician at [phone]. Excessive pain  Bright red bloody discharge  Fever or chills  Continued urgency, frequency or burning with urination    Obtaining Test Results    Your urodynamic test will be interpreted by a specialist and available to the referring physician within 7-14 days. Patients in our clinic are given an appointment to come back to discuss the results and any appropriate treatment recommendations. Please do not hesitate to contact our office with any questions or concerns at [phone]. I acknowledge that I have received verbal and written discharge  instructions and that I understand these instructions clearly. Patient Signature:    Date:    [facility]    Fingertip Application Method for Estrogen Vaginal Cream    1. Wash you hands with soap and water and dry thoroughly. 2.  Squeeze out enough cream from the tube to cover 1/2 of your index finger. (See figure 1)    3. Locate the vaginal opening (See figure 2). Immediately above the vagina is the urethra (a small opening where urine is eliminated from your body).   The urethra may not be as easily identified as the vagina because the opening is much smaller, however, use the diagram to determine its approximate location. 4.  Carefully spread the cream onto the external vaginal/urethral area (See figure 2). As the cream is spread, some may be gently inserted into the vagina; however, it is not necessary to push the cream high into your vagina.

## 2022-11-16 NOTE — PROCEDURES
Patient here for urodynamic testing. Procedure explained and confirmed by patient. See evaluation form for results. Both verbal and written discharge instructions were given. Patient tolerated procedure well and will follow up with Dr. Benny Gray on 22. URODYNAMIC EVALUATION    PATIENT HISTORY:    Prolapse:  No  CHARY:  Yes ( not often and NOT BOTHERSOME)  UUI:  Yes - ( urgency - does not use pads during the day- and feels she can control the urge to make it to the 2 - 3 hour jason)  Nocturia:  2 - this is MOST bothersome symptom - wears pads to bed and wakes up with urge and often will leak on way  to BR  Frequency:  every 2-3 hours  Incomplete Emptying:  No  Constipation:  No - this has IMPROVED using bowel regime  Last void prior to UDS testin minutes  Current urge to void? Strong  OAB meds stopped prior to test?  NA  Other symptoms? Lilliana Gaspar completed 12 sessions of PT and has feels this has helped - Has not started Estrace cream - will start this today   Was doing repetitive Kegels prior to sleeping ( not always allowing full relaxation of contraction ) encouraged to try and perform the [address] correctly       Surgery?   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  Incomplete Bladder Emptying was no demonstrated at post flow  MEDICATION: estradiol (ESTRACE) 0.1 MG/GM Vaginal Cream, Apply 0.5 gram vaginally 2 - 3   times per week., Disp: 42.5 g, Rfl: 3         ALLERGIES:  Bee Venom      EXAM:  Urinalysis Dip:  Today's Results   Component Date Value    control run 2022 Yes     Blood Urine 2022 Negative     Nitrite Urine 2022 Negative     Leukocyte esterase urine 2022 Negative       Urovesico [place]unction ( <30 degrees ):  []  Mobile  [x]  Fixed    Perineal Sensation:  [x]  Normal  []  Abnormal    Additional Notes:    PROLAPSE (past introitus):  []  Yes  [x]  No  Prolapse reduced for testing? []  Yes  [x]  No  []  Pessary  []  Speculum  []  Proctoswab  []  Vag Packing    Additional Notes:     UROFLOWMETRY:  Voided Volume:            13 plus 200 mL BR void                  mL  Maximum Flow Rate:                       3         mL/sec  Average flow rate:                            2 mL/sec  Post-void Residual:                       125    mL  Pattern:  []  Normal  [x]  Poor flow     []  Intermittent  []  Other  Void:   []  Typical  [x]  Atypical    Additional Notes: reports initial strong urge to void - only able to void small amount in UDS room - up to BR and voided additional 200 mL plus large BM - there was a 15 - 20 minute delay prior to PVR due to repositioning and urethral visualization  - patient reports that she felt empty after completing void in BR    CYSTOMETRY:  Urethral Catheter:  Fr 7 / tdoc  Abd Catheter:     Fr 7 / tdoc   Infusion:  Water Rate 30 mL/min  Temp:  Room  Position:  [x]  Sit  []  Stand  []  Supine  First sensation:   100 mL  First desire to void:   130 mL  Strong desire to void:  271 mL  Maximum cystometric capacity:   348 mL  Detrusor Activity:  [x]  Unstable   []  Stable  Urge leakage? []  Yes [x]  No  Volume at 1st unhibited detrusor cont:   130 mL  Detrusor instability provoked by:    []  Spontaneous []  Coughing  [x]  Filling  []  Valsalva  []  Other    Additional Notes:      URETHRAL FUNCTION:  Valsava (vesical) Leak Point Pressures:    Volume Leak Point Pressure Leak? Cough Valsalva      100mL 131 102    cm H2O []  Yes [x]  No   200mL 109 76    cm H2O [x]  Yes []  No     Genuine Stress Incontinence demonstrated?    [x]  Yes  @ 200 ml with cough []  No    Resting Urethral Pressure Profile:     Functional Urethral Length:       0.6  cm                 cm                 cm  Maximum UCP:          64 cm             cm                  cm    PRESSURE/FLOW STUDY:  Voided volume:    504 plus    mL  Maximum flow rate:    31 mL/sec  Pressure Detrusor (at maximum flow):           53 cm H2O  Post void residual:        22       mL  Voiding mechanism:  []  Abnormal  [x]  Normal  []  Strain to void   []  Weak detrusor  Void:   [x]  Typical   []  Atypical    Additional Notes:    EMG:  [x]  Reactive []  Non-Reactive    11/16/2022 10:54 AM     PERFORMED BY:  Rusty Madison RN    URODYNAMIC PHYSICIAN INTERPRETATION    IMPRESSION:   213/125cc & 504/22cc  longterm 348cc  DO @130cc  CHARY @ 200 ml with cough        Post-Procedure Diagnoses: Detrusor instability [N31.9], Incomplete bladder emptying [R39.14], and Stress urinary incontinence [N39.3]    Comments:      Damaso Jorgensen DO   11/16/2022   12:10 PM

## 2022-12-06 ENCOUNTER — OFFICE VISIT (OUTPATIENT)
Dept: UROLOGY | Facility: CLINIC | Age: 72
End: 2022-12-06
Attending: OBSTETRICS & GYNECOLOGY
Payer: MEDICARE

## 2022-12-06 VITALS — WEIGHT: 183 LBS | BODY MASS INDEX: 28.72 KG/M2 | HEIGHT: 67 IN | TEMPERATURE: 98 F

## 2022-12-06 DIAGNOSIS — N39.3 FEMALE STRESS INCONTINENCE: ICD-10-CM

## 2022-12-06 DIAGNOSIS — N32.81 DETRUSOR INSTABILITY: Primary | ICD-10-CM

## 2022-12-06 DIAGNOSIS — N39.41 URGE INCONTINENCE: ICD-10-CM

## 2022-12-06 DIAGNOSIS — N95.2 POSTMENOPAUSAL ATROPHIC VAGINITIS: ICD-10-CM

## 2022-12-06 DIAGNOSIS — R35.1 NOCTURIA: ICD-10-CM

## 2022-12-06 PROCEDURE — 99212 OFFICE O/P EST SF 10 MIN: CPT

## 2022-12-06 RX ORDER — IBUPROFEN 200 MG
1 CAPSULE ORAL DAILY
COMMUNITY

## 2023-02-23 ENCOUNTER — VIRTUAL PHONE E/M (OUTPATIENT)
Dept: UROLOGY | Facility: CLINIC | Age: 73
End: 2023-02-23
Attending: OBSTETRICS & GYNECOLOGY
Payer: MEDICARE

## 2023-02-23 DIAGNOSIS — N81.84 PELVIC MUSCLE WASTING: ICD-10-CM

## 2023-02-23 DIAGNOSIS — N95.2 POSTMENOPAUSAL ATROPHIC VAGINITIS: ICD-10-CM

## 2023-02-23 DIAGNOSIS — R15.9 INCONTINENCE OF FECES, UNSPECIFIED FECAL INCONTINENCE TYPE: ICD-10-CM

## 2023-02-23 DIAGNOSIS — R35.1 NOCTURIA: ICD-10-CM

## 2023-02-23 DIAGNOSIS — N32.81 DETRUSOR INSTABILITY: Primary | ICD-10-CM

## 2023-02-23 DIAGNOSIS — N39.41 URGE INCONTINENCE: ICD-10-CM

## 2023-04-20 ENCOUNTER — VIRTUAL PHONE E/M (OUTPATIENT)
Dept: UROLOGY | Facility: CLINIC | Age: 73
End: 2023-04-20
Attending: OBSTETRICS & GYNECOLOGY
Payer: MEDICARE

## 2023-04-20 VITALS — WEIGHT: 183 LBS | HEIGHT: 67 IN | BODY MASS INDEX: 28.72 KG/M2

## 2023-04-20 DIAGNOSIS — N39.3 FEMALE STRESS INCONTINENCE: ICD-10-CM

## 2023-04-20 DIAGNOSIS — R15.9 INCONTINENCE OF FECES, UNSPECIFIED FECAL INCONTINENCE TYPE: Primary | ICD-10-CM

## 2023-04-20 DIAGNOSIS — R35.1 NOCTURIA: ICD-10-CM

## 2023-04-20 DIAGNOSIS — N95.2 POSTMENOPAUSAL ATROPHIC VAGINITIS: ICD-10-CM

## 2023-04-20 DIAGNOSIS — N32.81 DETRUSOR INSTABILITY: ICD-10-CM

## 2023-04-20 DIAGNOSIS — N39.41 URGE INCONTINENCE: ICD-10-CM

## 2023-06-08 ENCOUNTER — TELEPHONE (OUTPATIENT)
Dept: FAMILY MEDICINE CLINIC | Facility: CLINIC | Age: 73
End: 2023-06-08

## 2023-07-29 ENCOUNTER — PATIENT MESSAGE (OUTPATIENT)
Dept: FAMILY MEDICINE CLINIC | Facility: CLINIC | Age: 73
End: 2023-07-29

## 2023-07-31 NOTE — TELEPHONE ENCOUNTER
From: Dleoris Kolb  To: Carole Naylor MD  Sent: 7/29/2023  5:15 PM CDT  Subject: appointment    I am due for an annual physical    I would like to know if  the following test are included or should I sign up for an appointment with [facility]  1. Abdominal Aortic Aneurysm test  2. Arterial Fibrillation Test  3. Kidney Disease Screening  4. Osteoporosis Screening  5. ALc screening  6, HSG Reactive Protein Test  7. Carotol Atrey Scan  8.  Thyroid Hormone test  thank you      Dr. Jessi Peguero,  please advise which of the above test are included in routine Annual PE.

## 2023-08-04 ENCOUNTER — TELEPHONE (OUTPATIENT)
Dept: FAMILY MEDICINE CLINIC | Facility: CLINIC | Age: 73
End: 2023-08-04

## 2023-08-04 NOTE — TELEPHONE ENCOUNTER
Patient scheduled MA supervisit with DR PIERRE and was wondering if she could get lab orders , mmg , dexa  to get it done before her appointment in October .

## 2023-08-04 NOTE — TELEPHONE ENCOUNTER
Last physical 10/18/22    Last labs 1024/22    Physical scheduled:  Future Appointments   Date Time Provider Jaylon Girardi   10/4/2023  1:00 PM Brenton Palomino MD [facility]       Front- please inform pt PCP will address all required testing at time of OV. Thank you.

## 2023-10-04 ENCOUNTER — OFFICE VISIT (OUTPATIENT)
Dept: FAMILY MEDICINE CLINIC | Facility: CLINIC | Age: 73
End: 2023-10-04
Payer: COMMERCIAL

## 2023-10-04 VITALS
WEIGHT: 182 LBS | OXYGEN SATURATION: 98 % | DIASTOLIC BLOOD PRESSURE: 76 MMHG | HEIGHT: 66 IN | HEART RATE: 82 BPM | RESPIRATION RATE: 18 BRPM | TEMPERATURE: 98 F | SYSTOLIC BLOOD PRESSURE: 116 MMHG | BODY MASS INDEX: 29.25 KG/M2

## 2023-10-04 DIAGNOSIS — Z12.31 VISIT FOR SCREENING MAMMOGRAM: ICD-10-CM

## 2023-10-04 DIAGNOSIS — M17.11 ARTHRITIS OF KNEE, RIGHT: ICD-10-CM

## 2023-10-04 DIAGNOSIS — K44.9 HIATAL HERNIA: ICD-10-CM

## 2023-10-04 DIAGNOSIS — E55.9 VITAMIN D DEFICIENCY: ICD-10-CM

## 2023-10-04 DIAGNOSIS — Z13.0 SCREENING FOR DEFICIENCY ANEMIA: ICD-10-CM

## 2023-10-04 DIAGNOSIS — N39.46 MIXED INCONTINENCE: ICD-10-CM

## 2023-10-04 DIAGNOSIS — Z13.29 SCREENING FOR THYROID DISORDER: ICD-10-CM

## 2023-10-04 DIAGNOSIS — M19.049 LOCALIZED, PRIMARY OSTEOARTHRITIS OF HAND, UNSPECIFIED LATERALITY: ICD-10-CM

## 2023-10-04 DIAGNOSIS — Z13.6 SCREENING FOR CARDIOVASCULAR CONDITION: ICD-10-CM

## 2023-10-04 DIAGNOSIS — Z01.01 VISION EXAM WITH ABNORMAL FINDINGS: ICD-10-CM

## 2023-10-04 DIAGNOSIS — D17.20 LIPOMA OF UPPER EXTREMITY, UNSPECIFIED LATERALITY: ICD-10-CM

## 2023-10-04 DIAGNOSIS — K21.9 GASTROESOPHAGEAL REFLUX DISEASE WITHOUT ESOPHAGITIS: ICD-10-CM

## 2023-10-04 DIAGNOSIS — E01.0 THYROMEGALY: ICD-10-CM

## 2023-10-04 DIAGNOSIS — E78.00 PURE HYPERCHOLESTEROLEMIA: ICD-10-CM

## 2023-10-04 DIAGNOSIS — Z23 NEED FOR VACCINATION: ICD-10-CM

## 2023-10-04 DIAGNOSIS — M85.80 OSTEOPENIA, UNSPECIFIED LOCATION: ICD-10-CM

## 2023-10-04 DIAGNOSIS — Z00.00 ENCOUNTER FOR ANNUAL HEALTH EXAMINATION: Primary | ICD-10-CM

## 2023-10-04 PROBLEM — E04.9 GOITER: Status: ACTIVE | Noted: 2021-02-05

## 2023-10-04 PROBLEM — M54.16 LUMBAR RADICULITIS: Status: ACTIVE | Noted: 2019-12-13

## 2023-10-04 PROCEDURE — 3078F DIAST BP <80 MM HG: CPT | Performed by: FAMILY MEDICINE

## 2023-10-04 PROCEDURE — 90662 IIV NO PRSV INCREASED AG IM: CPT | Performed by: FAMILY MEDICINE

## 2023-10-04 PROCEDURE — 1170F FXNL STATUS ASSESSED: CPT | Performed by: FAMILY MEDICINE

## 2023-10-04 PROCEDURE — 1125F AMNT PAIN NOTED PAIN PRSNT: CPT | Performed by: FAMILY MEDICINE

## 2023-10-04 PROCEDURE — 96160 PT-FOCUSED HLTH RISK ASSMT: CPT | Performed by: FAMILY MEDICINE

## 2023-10-04 PROCEDURE — 3074F SYST BP LT 130 MM HG: CPT | Performed by: FAMILY MEDICINE

## 2023-10-04 PROCEDURE — 1160F RVW MEDS BY RX/DR IN RCRD: CPT | Performed by: FAMILY MEDICINE

## 2023-10-04 PROCEDURE — 3008F BODY MASS INDEX DOCD: CPT | Performed by: FAMILY MEDICINE

## 2023-10-04 PROCEDURE — G0008 ADMIN INFLUENZA VIRUS VAC: HCPCS | Performed by: FAMILY MEDICINE

## 2023-10-04 PROCEDURE — 1159F MED LIST DOCD IN RCRD: CPT | Performed by: FAMILY MEDICINE

## 2023-10-04 PROCEDURE — G0439 PPPS, SUBSEQ VISIT: HCPCS | Performed by: FAMILY MEDICINE

## 2023-10-18 ENCOUNTER — LAB ENCOUNTER (OUTPATIENT)
Dept: LAB | Age: 73
End: 2023-10-18
Attending: FAMILY MEDICINE
Payer: MEDICARE

## 2023-10-18 ENCOUNTER — HOSPITAL ENCOUNTER (OUTPATIENT)
Dept: MAMMOGRAPHY | Age: 73
Discharge: HOME | End: 2023-10-18
Attending: FAMILY MEDICINE
Payer: MEDICARE

## 2023-10-18 DIAGNOSIS — Z13.0 SCREENING FOR DEFICIENCY ANEMIA: ICD-10-CM

## 2023-10-18 DIAGNOSIS — E55.9 VITAMIN D DEFICIENCY: ICD-10-CM

## 2023-10-18 DIAGNOSIS — Z12.31 VISIT FOR SCREENING MAMMOGRAM: ICD-10-CM

## 2023-10-18 DIAGNOSIS — E78.2 ELEVATED CHOLESTEROL WITH ELEVATED TRIGLYCERIDES: ICD-10-CM

## 2023-10-18 DIAGNOSIS — Z13.29 SCREENING FOR THYROID DISORDER: ICD-10-CM

## 2023-10-18 DIAGNOSIS — Z13.6 SCREENING FOR CARDIOVASCULAR CONDITION: ICD-10-CM

## 2023-10-18 LAB
ALBUMIN SERPL-MCNC: 3.8 G/DL (ref 3.4–5)
ALBUMIN/GLOB SERPL: 1 {RATIO} (ref 1–2)
ALP LIVER SERPL-CCNC: 69 U/L
ALT SERPL-CCNC: 28 U/L
ANION GAP SERPL CALC-SCNC: 3 MMOL/L (ref 0–18)
AST SERPL-CCNC: 20 U/L (ref 15–37)
BASOPHILS # BLD AUTO: 0.06 X10(3) UL (ref 0–0.2)
BASOPHILS NFR BLD AUTO: 1.2 %
BILIRUB SERPL-MCNC: 0.5 MG/DL (ref 0.1–2)
BUN BLD-MCNC: 13 MG/DL (ref 7–18)
CALCIUM BLD-MCNC: 10 MG/DL (ref 8.5–10.1)
CHLORIDE SERPL-SCNC: 108 MMOL/L (ref 98–112)
CHOLEST SERPL-MCNC: 237 MG/DL (ref ?–200)
CO2 SERPL-SCNC: 29 MMOL/L (ref 21–32)
CREAT BLD-MCNC: 0.94 MG/DL
EGFRCR SERPLBLD CKD-EPI 2021: 64 ML/MIN/1.73M2 (ref 60–?)
EOSINOPHIL # BLD AUTO: 0.17 X10(3) UL (ref 0–0.7)
EOSINOPHIL NFR BLD AUTO: 3.5 %
ERYTHROCYTE [DISTWIDTH] IN BLOOD BY AUTOMATED COUNT: 13.2 %
FASTING PATIENT LIPID ANSWER: YES
FASTING STATUS PATIENT QL REPORTED: YES
GLOBULIN PLAS-MCNC: 3.9 G/DL (ref 2.8–4.4)
GLUCOSE BLD-MCNC: 87 MG/DL (ref 70–99)
HCT VFR BLD AUTO: 47.1 %
HDLC SERPL-MCNC: 82 MG/DL (ref 40–59)
HGB BLD-MCNC: 15.3 G/DL
IMM GRANULOCYTES # BLD AUTO: 0.02 X10(3) UL (ref 0–1)
IMM GRANULOCYTES NFR BLD: 0.4 %
LDLC SERPL CALC-MCNC: 133 MG/DL (ref ?–100)
LYMPHOCYTES # BLD AUTO: 2.07 X10(3) UL (ref 1–4)
LYMPHOCYTES NFR BLD AUTO: 42.7 %
MCH RBC QN AUTO: 29.9 PG (ref 26–34)
MCHC RBC AUTO-ENTMCNC: 32.5 G/DL (ref 31–37)
MCV RBC AUTO: 92 FL
MONOCYTES # BLD AUTO: 0.44 X10(3) UL (ref 0.1–1)
MONOCYTES NFR BLD AUTO: 9.1 %
NEUTROPHILS # BLD AUTO: 2.09 X10 (3) UL (ref 1.5–7.7)
NEUTROPHILS # BLD AUTO: 2.09 X10(3) UL (ref 1.5–7.7)
NEUTROPHILS NFR BLD AUTO: 43.1 %
NONHDLC SERPL-MCNC: 155 MG/DL (ref ?–130)
OSMOLALITY SERPL CALC.SUM OF ELEC: 289 MOSM/KG (ref 275–295)
PLATELET # BLD AUTO: 263 10(3)UL (ref 150–450)
POTASSIUM SERPL-SCNC: 3.8 MMOL/L (ref 3.5–5.1)
PROT SERPL-MCNC: 7.7 G/DL (ref 6.4–8.2)
RBC # BLD AUTO: 5.12 X10(6)UL
SODIUM SERPL-SCNC: 140 MMOL/L (ref 136–145)
TRIGL SERPL-MCNC: 125 MG/DL (ref 30–149)
TSI SER-ACNC: 1.15 MIU/ML (ref 0.36–3.74)
VIT D+METAB SERPL-MCNC: 51.6 NG/ML (ref 30–100)
VLDLC SERPL CALC-MCNC: 23 MG/DL (ref 0–30)
WBC # BLD AUTO: 4.9 X10(3) UL (ref 4–11)

## 2023-10-18 PROCEDURE — 80053 COMPREHEN METABOLIC PANEL: CPT

## 2023-10-18 PROCEDURE — 82306 VITAMIN D 25 HYDROXY: CPT

## 2023-10-18 PROCEDURE — 84443 ASSAY THYROID STIM HORMONE: CPT

## 2023-10-18 PROCEDURE — 85025 COMPLETE CBC W/AUTO DIFF WBC: CPT

## 2023-10-18 PROCEDURE — 77063 BREAST TOMOSYNTHESIS BI: CPT | Performed by: FAMILY MEDICINE

## 2023-10-18 PROCEDURE — 80061 LIPID PANEL: CPT

## 2023-10-18 PROCEDURE — 36415 COLL VENOUS BLD VENIPUNCTURE: CPT

## 2023-10-18 PROCEDURE — 77067 SCR MAMMO BI INCL CAD: CPT | Performed by: FAMILY MEDICINE

## 2023-10-24 ENCOUNTER — OFFICE VISIT (OUTPATIENT)
Dept: UROLOGY | Facility: CLINIC | Age: 73
End: 2023-10-24
Attending: OBSTETRICS & GYNECOLOGY

## 2023-10-24 VITALS
BODY MASS INDEX: 29.25 KG/M2 | SYSTOLIC BLOOD PRESSURE: 120 MMHG | DIASTOLIC BLOOD PRESSURE: 65 MMHG | HEIGHT: 66 IN | WEIGHT: 182 LBS

## 2023-10-24 DIAGNOSIS — N39.41 URGE INCONTINENCE: ICD-10-CM

## 2023-10-24 DIAGNOSIS — R35.1 NOCTURIA: ICD-10-CM

## 2023-10-24 DIAGNOSIS — N95.2 POSTMENOPAUSAL ATROPHIC VAGINITIS: ICD-10-CM

## 2023-10-24 DIAGNOSIS — N32.81 DETRUSOR INSTABILITY: Primary | ICD-10-CM

## 2023-10-24 DIAGNOSIS — N81.84 PELVIC MUSCLE WASTING: ICD-10-CM

## 2023-10-24 PROCEDURE — 99212 OFFICE O/P EST SF 10 MIN: CPT

## 2023-10-24 RX ORDER — VIBEGRON 75 MG/1
75 TABLET, FILM COATED ORAL DAILY
Qty: 30 TABLET | Refills: 11 | Status: SHIPPED | OUTPATIENT
Start: 2023-10-24 | End: 2023-11-23

## 2023-10-24 RX ORDER — ESTRADIOL 0.1 MG/G
CREAM VAGINAL
Qty: 42.5 G | Refills: 3 | Status: SHIPPED | OUTPATIENT
Start: 2023-10-24

## 2023-11-01 ENCOUNTER — TELEPHONE (OUTPATIENT)
Dept: FAMILY MEDICINE CLINIC | Facility: CLINIC | Age: 73
End: 2023-11-01

## 2023-11-01 ENCOUNTER — TELEMEDICINE (OUTPATIENT)
Dept: FAMILY MEDICINE CLINIC | Facility: CLINIC | Age: 73
End: 2023-11-01
Payer: COMMERCIAL

## 2023-11-01 VITALS — OXYGEN SATURATION: 98 % | TEMPERATURE: 100 F

## 2023-11-01 DIAGNOSIS — U07.1 COVID-19 VIRUS INFECTION: Primary | ICD-10-CM

## 2023-11-01 PROCEDURE — 1159F MED LIST DOCD IN RCRD: CPT | Performed by: FAMILY MEDICINE

## 2023-11-01 PROCEDURE — 1160F RVW MEDS BY RX/DR IN RCRD: CPT | Performed by: FAMILY MEDICINE

## 2023-11-01 PROCEDURE — 99213 OFFICE O/P EST LOW 20 MIN: CPT | Performed by: FAMILY MEDICINE

## 2023-11-01 NOTE — TELEPHONE ENCOUNTER
Called and spoke to patient. Symptoms started on Monday, tested positive for covid today. Has low grade fever . Feels stuffy with upper airway congestion, sneezing and scratchy throat, HA and BA. Was getting eye drops in preparation for cataract surgery that was already causing some PND. Surgery has been rescheduled. Has not taken any OTC medications. Wants to discuss Paxlovid treatment first.  Syl Romero will need Video Visit to review pros/cons and discuss if paxlovid is appropriate treatment for her. VV scheduled. Advised to push fluids, warm tea w honey/lemon, steam tx, salt water gargle, antihistamine, tylenol and/or ibuprofen. Requested she take temp prior to dose of tylenol to report at Video Visit. Verbalizes understanding.     Future Appointments   Date Time Provider Jaylon Knutson   11/1/2023  2:40 PM Aris Arizmendi MD [facility]   12/5/2023 12:40 PM ASMITA Mack None

## 2023-11-01 NOTE — TELEPHONE ENCOUNTER
Pt called to let us know she tested positive for covid this morning. Started having symptoms yday. Symptoms are headaches, low grade fever, congestion, fatigue. Pt also received the covid shot a week and half ago. Wants to know if she can receive the anti-viral medication. Please triage.

## 2023-12-05 ENCOUNTER — TELEPHONE (OUTPATIENT)
Dept: UROLOGY | Facility: CLINIC | Age: 73
End: 2023-12-05

## 2023-12-05 ENCOUNTER — VIRTUAL PHONE E/M (OUTPATIENT)
Dept: UROLOGY | Facility: CLINIC | Age: 73
End: 2023-12-05
Attending: OBSTETRICS & GYNECOLOGY
Payer: MEDICARE

## 2023-12-05 DIAGNOSIS — N95.2 POSTMENOPAUSAL ATROPHIC VAGINITIS: ICD-10-CM

## 2023-12-05 DIAGNOSIS — N81.84 PELVIC MUSCLE WASTING: ICD-10-CM

## 2023-12-05 DIAGNOSIS — R35.1 NOCTURIA: ICD-10-CM

## 2023-12-05 DIAGNOSIS — N32.81 DETRUSOR INSTABILITY: Primary | ICD-10-CM

## 2023-12-05 DIAGNOSIS — N39.41 URGE INCONTINENCE: ICD-10-CM

## 2023-12-05 RX ORDER — VIBEGRON 75 MG/1
75 TABLET, FILM COATED ORAL DAILY
Qty: 90 TABLET | Refills: 3 | Status: SHIPPED | OUTPATIENT
Start: 2023-12-05 | End: 2024-01-04

## 2023-12-05 NOTE — TELEPHONE ENCOUNTER
Contacted patient to complete check-in process for [facility] phone appt.   Noted that patient e-checked in via [address] patient portal.  LVM for patient to call back with any questions or to confirm check-in

## 2023-12-05 NOTE — PROGRESS NOTES
Patient to follow up UUI  Given circumstances surrounding COVID-19 this visit is being conducted as a televisit with pt's consent. Pt in safe, private location prior to beginning visit. Provider located in office setting. She is currently using Gemtesa 75 mg daily    She reports 90% improvement and reports no dry mouth or constipation  Denies other significant side effects   Using vaginal estrogen cream intermittently about 2x/week  Bowels irregular, stressed, loose, taking benefiber everyday   Recently had covid   Denies any s/sx of UTI  Nocturia greatly improved, 0-1/night     UDS 11/16/22  IMPRESSION:   213/125cc & 504/22cc  long term 348cc  DO @130cc  CHARY @ 200 ml with cough       Has tried in the past:  PT  Trospium ER - minimal improvement, stopped after 3 days, PT told her to stop medication    Urogynecology Summary:       There were no vitals taken for this visit. Impression/Plan:    ICD-10-CM    1. Detrusor instability  N32.81 Vibegron (GEMTESA) 75 MG Oral Tab      2. Nocturia  R35.1 Vibegron (GEMTESA) 75 MG Oral Tab      3. Urge incontinence  N39.41 Vibegron (GEMTESA) 75 MG Oral Tab      4. Pelvic muscle wasting  N81.84       5. Postmenopausal atrophic vaginitis  N95.2           Discussion Items:   Discussed dietary and behavioral modifications for mgmt of urinary symptoms  Discussed weight management and benefits of weight loss on urinary symptoms  Reviewed AUA/SUFU guidelines on mgmt of non-neurogenic OAB  Discussed pharmacologic and nonpharmacologic mgmt options of urinary symptoms - reviewed risks, benefits, alternatives, and goals of treatment  Discussed specific risks related to OAB meds including, but not limited to dry mouth, constipation, blurry vision, cognitive changes, and BP elevation.     Treatment Plan, Non-surgical:   Continue Gemtesa 75 mg daily, wants 90 day prescription  Recommend vaginal estrogen cream twice a week  Bladder diet/drill  Bowel regimen reviewed  Call with s/sx of UTI  All questions answered  She understands and agrees to plan    Return in about 1 year (around 12/5/2024) for UUI, UGA, sooner prn .     Jaymie Tao PA-C

## 2024-01-25 ENCOUNTER — TELEPHONE (OUTPATIENT)
Dept: FAMILY MEDICINE CLINIC | Facility: CLINIC | Age: 74
End: 2024-01-25

## 2024-01-25 NOTE — TELEPHONE ENCOUNTER
Did not know she is a pt of Dr. PIERRE.   Up to Dr. PIERRE to decide about switching, I'm okay either way

## 2024-01-25 NOTE — TELEPHONE ENCOUNTER
Changing PCP - Pts husb came in statet pt is currently with PCP Dr GABBY lane to switch to Dr CHOUDHARY as per spouse already spoke to Dr CHOUDHARY - for personal reasons. Want to let Dr PIERRE know, please confirm and both approve.

## 2024-02-28 ENCOUNTER — MED REC SCAN ONLY (OUTPATIENT)
Dept: FAMILY MEDICINE CLINIC | Facility: CLINIC | Age: 74
End: 2024-02-28

## 2024-03-26 PROBLEM — H25.13 AGE-RELATED NUCLEAR CATARACT OF BOTH EYES: Status: ACTIVE | Noted: 2023-10-16

## 2024-03-26 RX ORDER — DOXYCYCLINE HYCLATE 100 MG/1
CAPSULE ORAL
COMMUNITY
Start: 2024-01-02 | End: 2024-03-27 | Stop reason: ALTCHOICE

## 2024-03-26 RX ORDER — BESIFLOXACIN 6 MG/ML
SUSPENSION OPHTHALMIC
COMMUNITY
Start: 2023-10-20 | End: 2024-03-27 | Stop reason: ALTCHOICE

## 2024-03-26 RX ORDER — MOXIFLOXACIN 5 MG/ML
SOLUTION/ DROPS OPHTHALMIC
COMMUNITY
End: 2024-03-27 | Stop reason: ALTCHOICE

## 2024-03-26 RX ORDER — KETOROLAC TROMETHAMINE 5 MG/ML
SOLUTION OPHTHALMIC
COMMUNITY
End: 2024-03-27 | Stop reason: ALTCHOICE

## 2024-03-26 RX ORDER — PREDNISOLONE ACETATE 10 MG/ML
SUSPENSION/ DROPS OPHTHALMIC
COMMUNITY
End: 2024-03-27 | Stop reason: ALTCHOICE

## 2024-03-26 RX ORDER — VIBEGRON 75 MG/1
75 TABLET, FILM COATED ORAL DAILY
COMMUNITY
Start: 2023-12-15

## 2024-03-27 ENCOUNTER — OFFICE VISIT (OUTPATIENT)
Dept: FAMILY MEDICINE CLINIC | Facility: CLINIC | Age: 74
End: 2024-03-27
Payer: COMMERCIAL

## 2024-03-27 VITALS
HEART RATE: 74 BPM | TEMPERATURE: 97 F | BODY MASS INDEX: 28.47 KG/M2 | SYSTOLIC BLOOD PRESSURE: 120 MMHG | OXYGEN SATURATION: 97 % | RESPIRATION RATE: 16 BRPM | HEIGHT: 66 IN | DIASTOLIC BLOOD PRESSURE: 70 MMHG | WEIGHT: 177.13 LBS

## 2024-03-27 DIAGNOSIS — Z12.31 VISIT FOR SCREENING MAMMOGRAM: ICD-10-CM

## 2024-03-27 DIAGNOSIS — N39.46 MIXED INCONTINENCE: ICD-10-CM

## 2024-03-27 DIAGNOSIS — Z78.0 POSTMENOPAUSAL: ICD-10-CM

## 2024-03-27 DIAGNOSIS — M17.0 PRIMARY OSTEOARTHRITIS OF BOTH KNEES: ICD-10-CM

## 2024-03-27 DIAGNOSIS — Z13.0 SCREENING FOR DEFICIENCY ANEMIA: ICD-10-CM

## 2024-03-27 DIAGNOSIS — M81.0 OSTEOPOROSIS OF LUMBAR SPINE: ICD-10-CM

## 2024-03-27 DIAGNOSIS — Z13.6 SCREENING FOR CARDIOVASCULAR CONDITION: ICD-10-CM

## 2024-03-27 DIAGNOSIS — E01.0 THYROMEGALY: ICD-10-CM

## 2024-03-27 DIAGNOSIS — K21.9 GASTROESOPHAGEAL REFLUX DISEASE WITHOUT ESOPHAGITIS: ICD-10-CM

## 2024-03-27 DIAGNOSIS — Z00.00 ENCOUNTER FOR ANNUAL HEALTH EXAMINATION: Primary | ICD-10-CM

## 2024-03-27 DIAGNOSIS — E55.9 VITAMIN D DEFICIENCY: ICD-10-CM

## 2024-03-27 DIAGNOSIS — K44.9 HIATAL HERNIA: ICD-10-CM

## 2024-03-27 DIAGNOSIS — E78.00 PURE HYPERCHOLESTEROLEMIA: ICD-10-CM

## 2024-03-27 DIAGNOSIS — Z13.1 SCREENING FOR DIABETES MELLITUS (DM): ICD-10-CM

## 2024-03-27 DIAGNOSIS — Z13.29 SCREENING FOR THYROID DISORDER: ICD-10-CM

## 2024-03-27 PROBLEM — R26.9 ABNORMAL GAIT: Status: ACTIVE | Noted: 2018-10-24

## 2024-03-27 PROBLEM — M70.62 TROCHANTERIC BURSITIS OF LEFT HIP: Status: ACTIVE | Noted: 2018-10-31

## 2024-03-27 RX ORDER — IBANDRONATE SODIUM 150 MG/1
150 TABLET, FILM COATED ORAL
Qty: 3 TABLET | Refills: 3 | Status: SHIPPED | OUTPATIENT
Start: 2024-03-27

## 2024-03-27 RX ORDER — MELOXICAM 15 MG/1
15 TABLET ORAL EVERY MORNING
COMMUNITY
Start: 2024-02-16

## 2024-03-27 NOTE — PATIENT INSTRUCTIONS
[employer] SCREENING SCHEDULE   Tests on this list are recommended by your physician but may not be covered, or covered at this frequency, by your insurer.   Please check with your insurance carrier before scheduling to verify coverage.   PREVENTATIVE SERVICES FREQUENCY &  COVERAGE DETAILS LAST COMPLETION DATE   Diabetes Screening    Fasting Blood Sugar /  Glucose    One screening every 12 months if never tested or if previously tested but not diagnosed with pre-diabetes   One screening every 6 months if diagnosed with pre-diabetes Lab Results   Component Value Date    GLU 87 10/18/2023        Cardiovascular Disease Screening    Lipid Panel  Cholesterol  Lipoprotein (HDL)  Triglycerides Covered every 5 years for all Medicare beneficiaries without apparent signs or symptoms of cardiovascular disease Lab Results   Component Value Date    CHOLEST 237 (H) 10/18/2023    HDL 82 (H) 10/18/2023     (H) 10/18/2023    TRIG 125 10/18/2023         Electrocardiogram (EKG)   Covered if needed at Welcome to Medicare, and non-screening if indicated for medical reasons 11/07/2016      Ultrasound Screening for Abdominal Aortic Aneurysm (AAA) Covered once in a lifetime for one of the following risk factors   • Men who are 65-75 years old and have ever smoked   • Anyone with a family history -     Colorectal Cancer Screening  Covered for ages 50-85; only need ONE of the following:    Colonoscopy   Covered every 10 years    Covered every 2 years if patient is at high risk or previous colonoscopy was abnormal 04/15/2022    Health Maintenance   Topic Date Due   • Colorectal Cancer Screening  04/15/2027       Flexible Sigmoidoscopy   Covered every 4 years 03/29/2021    Fecal Occult Blood Test Covered annually -   Bone Density Screening    Bone density screening    Covered every 2 years after age 65 if diagnosed with risk of osteoporosis or estrogen deficiency.    Covered yearly for long-term glucocorticoid medication use  (Steroids) Last Dexa Scan:    XR DEXA BONE DENSITOMETRY (CPT=77080) 10/24/2022      No recommendations at this time   Pap and Pelvic    Pap   Covered every 2 years for women at normal risk; Annually if at high risk -  No recommendations at this time    Chlamydia Annually if high risk -  No recommendations at this time   Screening Mammogram    Mammogram     Recommend annually for all female patients aged 40 and older    One baseline mammogram covered for patients aged 35-39 10/18/2023    Health Maintenance   Topic Date Due   • Mammogram  10/18/2024       Immunizations    Influenza Covered once per flu season  Please get every year 10/04/2023  No recommendations at this time    Pneumococcal Each vaccine ([id number] & [id number]) covered once after 65 Prevnar 13: 12/02/2016    [id number]: 11/24/2014     No recommendations at this time    Hepatitis B One screening covered for patients with certain risk factors   -  No recommendations at this time    Tetanus Toxoid Not covered by Medicare Part B unless medically necessary (cut with metal); may be covered with your pharmacy prescription benefits -    Tetanus, Diptheria and Pertusis TD and TDaP Not covered by Medicare Part B -  No recommendations at this time    Zoster Not covered by Medicare Part B; may be covered with your pharmacy  prescription benefits -  No recommendations at this time

## 2024-03-27 NOTE — PROGRESS NOTES
Subjective:   Modesta Blue is a 73 year old female who presents for a MA (Medicare Advantage) Supervisit (Once per calendar year) and scheduled follow up of multiple significant but stable problems.     Chronic conditions, stable. Following with appropriate specialists.     Pmhx: HL, GERD, hiatal hernia, mixed incontinence, OA of bilateral knees  Pshx: gastric bypass, ovarian cystectomy,   All: Bee venom  Med: as below  Fam Hx: mom: dementia, dad: liver ca (alcoholic), sister: HTN, brother: HTN  Soc Hx: , teaching, lives with , no pets  Ob/Gyne:   Colonoscopy: UTD  Immunizations: UTD       History/Other:   Fall Risk Assessment:   She has been screened for Falls and is low risk.      Cognitive Assessment:   She had a completely normal cognitive assessment - see flowsheet entries     Functional Ability/Status:   Modesta Blue has a completely normal functional assessment. See flowsheet for details.        Depression Screening (PHQ-2/PHQ-9): PHQ-2 SCORE: 0  , done 3/27/2024       Advanced Directives:   She does have a Living Will but we do NOT have it on file in Epic.    She does have a POA but we do NOT have it on file in Epic.    Discussed Advance Care Planning with patient (and family/surrogate if present). Standard forms made available to patient in After Visit Summary.      Patient Active Problem List   Diagnosis    Osteopenia    Pure hypercholesterolemia    Primary localized osteoarthrosis, hand    Lipoma of upper extremity    Spondylolisthesis of lumbar region    Urinary urgency    Thyromegaly    Arthritis of knee, right    Mixed incontinence    Gastroesophageal reflux disease without esophagitis    Hiatal hernia    Arthritis of right knee    Goiter    Age-related nuclear cataract of both eyes    Abnormal gait    Trochanteric bursitis of left hip     Allergies:  She is allergic to bee venom.    Current Medications:  Outpatient Medications Marked as Taking for the 3/27/24 encounter  (Office Visit) with Ashish Helton,    Medication Sig    Meloxicam 15 MG Oral Tab Take 1 tablet (15 mg total) by mouth every morning.    Ibandronate Sodium 150 MG Oral Tab Take 1 tablet (150 mg total) by mouth every 30 (thirty) days. Use as directed    CRANBERRY OR Take by mouth As Directed.    halobetasol 0.05 % External Cream 1 Application(s) 2 times daily External over affected knee area    Vibegron (GEMTESA) 75 MG Oral Tab Take 75 mg by mouth daily.    Glucosamine-Chondroit-Vit C-Mn (GLUCOSAMINE CHONDR 500 COMPLEX OR) Take by mouth.    estradiol (ESTRACE) 0.1 MG/GM Vaginal Cream Apply 1/2  gram vaginally 2 times per week.    Calcium Citrate 950 (200 Ca) MG Oral Tab Take 1 tablet (950 mg total) by mouth daily.    Esomeprazole Magnesium 20 MG Oral Capsule Delayed Release Take 1 capsule (20 mg total) by mouth every morning before breakfast.    Multiple Vitamins-Minerals (CENTRUM VITAMINTS OR) Take by mouth.       Medical History:  She  has a past medical history of Abdominal distention, Abdominal pain, Allergic rhinitis, Arthritis, Atrial fibrillation (HCC), Back pain, Belching, Bloating, Change in hair, Constipation, Diarrhea, unspecified, Easy bruising, Esophageal reflux, Family history of malignant neoplasm of urinary bladder, Flatulence/gas pain/belching, Frequent urination, Generalized osteoarthrosis, involving multiple sites, Heartburn, Hematuria, unspecified, Hemorrhoids, History of miscarriage, motion sickness, Hypercalcemia, Indigestion, Irregular bowel habits, Leaking of urine, Lipoma, Menopause, Need for prophylactic vaccination and inoculation against influenza, Night sweats,  (normal spontaneous vaginal delivery) ([facility]) (, ), Osteoarthritis, Osteopenia, Pain in joint, multiple sites, Pain in joints, Polydipsia, Problems with swallowing (2021), Pure hypercholesterolemia, Routine general medical examination at a health care facility, Routine gynecological examination, Screening for  diabetes mellitus, Screening for lipoid disorders, Screening for malignant neoplasm of the rectum, Screening for other and unspecified deficiency anemia, Screening for thyroid disorder, Sleep disturbance, Special screening for malignant neoplasms, colon, Stool incontinence (loose stools), Urinary tract infection, site not specified, Varicella, Visual impairment, and Wears glasses.  Surgical History:  She  has a past surgical history that includes other (2021); colonoscopy; Colectomy (2021); colonoscopy (N/A, 04/15/2022); d & c; ; other surgical history (colon sectioning ); cataract; and glaucoma surgery.   Family History:  Her family history includes Alcohol and Other Disorders Associated in her father; Arthritis in her father, mother, and another family member; Breast Cancer (age of onset: 51) in her daughter; Cancer in her brother, daughter, father, sister, sister, and another family member; Colon Polyps in her father; Dementia in her mother and another family member; Ear Problems in her father; Eye Problems in her mother; Glaucoma in her mother; Heart Disease in an other family member; High Cholesterol in an other family member; Hypertension in her sister; Neurological Disorder in her mother and another family member; Stroke in her mother and another family member; Thyroid Disorder in her sister; digestive disorder in her father; osteoporosis in her mother.  Social History:  She  reports that she has never smoked. She has never been exposed to tobacco smoke. She has never used smokeless tobacco. She reports current alcohol use of about 1.0 standard drink of alcohol per week. She reports that she does not use drugs.    Tobacco:  She has never smoked tobacco.    CAGE Alcohol Screen:   CAGE screening score of 0 on 3/27/2024, showing low risk of alcohol abuse.      Patient Care Team:  Ashish Helton DO as PCP - General (Family Medicine)  Isra Joshi MD as Obstetrician (OBSTETRICS &  GYNECOLOGY)  Stephani Farah MD (GASTROENTEROLOGY)    Review of Systems  GENERAL: feels well otherwise  SKIN: denies any unusual skin lesions  EYES: denies blurred vision or double vision  HEENT: denies nasal congestion, sinus pain or ST  LUNGS: denies shortness of breath with exertion  CARDIOVASCULAR: denies chest pain on exertion  GI: denies abdominal pain, +chronic heartburn  : +mixed incontinence  MUSCULOSKELETAL: +bilat knee pain  NEURO: denies headaches  PSYCHE: denies depression or anxiety  HEMATOLOGIC: denies hx of anemia  ENDOCRINE: denies thyroid history  ALL/ASTHMA: denies hx of allergy or asthma    Objective:   Physical Exam  General Appearance:  Alert, cooperative, no distress, appears stated age   Head:  Normocephalic, without obvious abnormality, atraumatic   Eyes:  PERRL, EOM's intact both eyes   Ears:  Normal TM's and external ear canals, both ears   Nose: Nares normal    Throat: MMM   Neck: Supple, symmetrical, trachea midline, thyromegaly, no carotid bruit     Back:   Symmetric, no curvature, ROM normal, no CVA tenderness   Lungs:   Clear to auscultation bilaterally, respirations unlabored   Heart:  Regular rate and rhythm, S1 and S2 normal    Abdomen:   Soft, non-tender, bowel sounds active all four quadrants,  no masses, no organomegaly   Pelvic: Deferred   Extremities: Extremities normal, atraumatic, no cyanosis or edema   Pulses: 2+ and symmetric   Skin: Skin color, texture, turgor normal, no rashes or lesions   Lymph nodes: Cervical nodes normal   Neurologic: Normal       /70   Pulse 74   Temp 97.2 °F (36.2 °C) (Temporal)   Resp 16   Ht 5' 6\" (1.676 m)   Wt 177 lb 2 oz (80.3 kg)   SpO2 97%   BMI 28.59 kg/m²  Estimated body mass index is 28.59 kg/m² as calculated from the following:    Height as of this encounter: 5' 6\" (1.676 m).    Weight as of this encounter: 177 lb 2 oz (80.3 kg).    Medicare Hearing Assessment:   Hearing Screening    Screening Method: Other          Visual Acuity:   Right Eye Visual Acuity: Uncorrected Right Eye Chart Acuity: 20/30   Left Eye Visual Acuity: Uncorrected Left Eye Chart Acuity: 20/30   Both Eyes Visual Acuity: Uncorrected Both Eyes Chart Acuity: 20/25   Able To Tolerate Visual Acuity: Yes        Assessment & Plan:   Modesta Blue is a 73 year old female who presents for a Medicare Assessment.     1. Encounter for annual health examination (Primary)  - reviewed anticipatory guidance based on age  - check fasting labs  -     Lipid Panel; Future; Expected date: 03/27/2024  -     Comp Metabolic Panel (14); Future; Expected date: 03/27/2024  -     CBC With Differential With Platelet; Future; Expected date: 03/27/2024  -     TSH W Reflex To Free T4; Future; Expected date: 03/27/2024    2. Visit for screening mammogram  -     [facility] IFRAH 2D+3D SCREENING BILAT (CPT=77067/92636); Future; Expected date: 10/15/2024    3. Screening for diabetes mellitus (DM)  -     Comp Metabolic Panel (14); Future; Expected date: 03/27/2024  -     Hemoglobin A1C; Future; Expected date: 03/27/2024    4. Screening for cardiovascular condition  -     Lipid Panel; Future; Expected date: 03/27/2024  -     Comp Metabolic Panel (14); Future; Expected date: 03/27/2024    5. Screening for deficiency anemia  -     CBC With Differential With Platelet; Future; Expected date: 03/27/2024    6. Screening for thyroid disorder  - check TSH  - previous imaging neg for nodule/cyst    7. Postmenopausal  -     Dexa Scan/Bone Density screening (Screening allowed every 2 years); Future; Expected date: 10/24/2024    8. Osteoporosis of lumbar spine  - DEXA 10/22: osteoporosis of l-spine  - discussed tx options   - will start ibrandronate 150mg po every month, reviewed indications, dosing and SEs  - cont ca + vit d3 supplementation  - wt bearing exercise as able  - recheck DEXA 10/2024  -     Dexa Scan/Bone Density screening (Screening allowed every 2 years); Future; Expected date:  10/24/2024  -     Ibandronate Sodium; Take 1 tablet (150 mg total) by mouth every 30 (thirty) days. Use as directed  Dispense: 3 tablet; Refill: 3    9. Pure hypercholesterolemia  - cont low chol diet and regular exercise  - check cmp and lipid panel  -     Lipid Panel; Future; Expected date: 03/27/2024  -     Comp Metabolic Panel (14); Future; Expected date: 03/27/2024    10. Mixed incontinence  - cont Gemtesa  - monitor    11. Thyromegaly  - stable    12. Gastroesophageal reflux disease without esophagitis  - stable  - cpm    13. Hiatal hernia  - stable  - cpm    14. Primary osteoarthritis of both knees  - cont meloxicam prn  - symptomatic tx  - monitor    15. Vitamin D deficiency  -     Vitamin D; Future; Expected date: 03/27/2024    The patient indicates understanding of these issues and agrees to the plan.  Reinforced healthy diet, lifestyle, and exercise.      Return in 6 months (on 9/27/2024).     Ashish Helton DO, 3/27/2024     Supplementary Documentation:   General Health:  In the past six months, have you lost more than 10 pounds without trying?: 2 - No  Has your appetite been poor?: No  Type of Diet: Balanced  How does the patient maintain a good energy level?: Appropriate Exercise;Stretching;Other  How would you describe your daily physical activity?: Moderate  How would you describe your current health state?: Good  How do you maintain positive mental well-being?: Social Interaction;Puzzles;Games;Visiting Friends;Visiting Family  On a scale of 0 to 10, with 0 being no pain and 10 being severe pain, what is your pain level?: 0 - (None)  In the past six months, have you experienced urine leakage?: 1-Yes  At any time do you feel concerned for the safety/well-being of yourself and/or your children, in your home or elsewhere?: No  Have you had any immunizations at another office such as Influenza, Hepatitis B, Tetanus, or Pneumococcal?: No         Modesta Blue's SCREENING SCHEDULE   Tests on this  list are recommended by your physician but may not be covered, or covered at this frequency, by your insurer.   Please check with your insurance carrier before scheduling to verify coverage.   PREVENTATIVE SERVICES FREQUENCY &  COVERAGE DETAILS LAST COMPLETION DATE   Diabetes Screening    Fasting Blood Sugar /  Glucose    One screening every 12 months if never tested or if previously tested but not diagnosed with pre-diabetes   One screening every 6 months if diagnosed with pre-diabetes Lab Results   Component Value Date    GLU 87 10/18/2023        Cardiovascular Disease Screening    Lipid Panel  Cholesterol  Lipoprotein (HDL)  Triglycerides Covered every 5 years for all Medicare beneficiaries without apparent signs or symptoms of cardiovascular disease Lab Results   Component Value Date    CHOLEST 237 (H) 10/18/2023    HDL 82 (H) 10/18/2023     (H) 10/18/2023    TRIG 125 10/18/2023         Electrocardiogram (EKG)   Covered if needed at Welcome to Medicare, and non-screening if indicated for medical reasons 11/07/2016      Ultrasound Screening for Abdominal Aortic Aneurysm (AAA) Covered once in a lifetime for one of the following risk factors    Men who are 65-75 years old and have ever smoked    Anyone with a family history -     Colorectal Cancer Screening  Covered for ages 50-85; only need ONE of the following:    Colonoscopy   Covered every 10 years    Covered every 2 years if patient is at high risk or previous colonoscopy was abnormal 04/15/2022    Health Maintenance   Topic Date Due    Colorectal Cancer Screening  04/15/2027       Flexible Sigmoidoscopy   Covered every 4 years 03/29/2021    Fecal Occult Blood Test Covered annually -   Bone Density Screening    Bone density screening    Covered every 2 years after age 65 if diagnosed with risk of osteoporosis or estrogen deficiency.    Covered yearly for long-term glucocorticoid medication use (Steroids) Last Dexa Scan:    XR DEXA BONE DENSITOMETRY  (CPT=77080) 10/24/2022      No recommendations at this time   Pap and Pelvic    Pap   Covered every 2 years for women at normal risk; Annually if at high risk -  No recommendations at this time    Chlamydia Annually if high risk -  No recommendations at this time   Screening Mammogram    Mammogram     Recommend annually for all female patients aged 40 and older    One baseline mammogram covered for patients aged 35-39 10/18/2023    Health Maintenance   Topic Date Due    Mammogram  10/18/2024       Immunizations    Influenza Covered once per flu season  Please get every year 10/04/2023  No recommendations at this time    Pneumococcal Each vaccine ([id number] & [id number]) covered once after 65 Prevnar 13: 12/02/2016    Azlzarvac51: 11/24/2014     No recommendations at this time    Hepatitis B One screening covered for patients with certain risk factors   -  No recommendations at this time    Tetanus Toxoid Not covered by Medicare Part B unless medically necessary (cut with metal); may be covered with your pharmacy prescription benefits -    Tetanus, Diptheria and Pertusis TD and TDaP Not covered by Medicare Part B -  No recommendations at this time    Zoster Not covered by Medicare Part B; may be covered with your pharmacy  prescription benefits -  No recommendations at this time

## 2024-03-30 PROBLEM — M54.16 LUMBAR RADICULITIS: Status: RESOLVED | Noted: 2019-12-13 | Resolved: 2024-03-30

## 2024-04-10 ENCOUNTER — LAB ENCOUNTER (OUTPATIENT)
Dept: LAB | Age: 74
End: 2024-04-10
Attending: FAMILY MEDICINE
Payer: MEDICARE

## 2024-04-10 DIAGNOSIS — Z13.1 SCREENING FOR DIABETES MELLITUS (DM): ICD-10-CM

## 2024-04-10 DIAGNOSIS — Z13.0 SCREENING FOR DEFICIENCY ANEMIA: ICD-10-CM

## 2024-04-10 DIAGNOSIS — Z13.6 SCREENING FOR CARDIOVASCULAR CONDITION: ICD-10-CM

## 2024-04-10 DIAGNOSIS — E78.00 PURE HYPERCHOLESTEROLEMIA: ICD-10-CM

## 2024-04-10 DIAGNOSIS — E55.9 VITAMIN D DEFICIENCY: ICD-10-CM

## 2024-04-10 DIAGNOSIS — Z00.00 ENCOUNTER FOR ANNUAL HEALTH EXAMINATION: ICD-10-CM

## 2024-04-10 LAB
ALBUMIN SERPL-MCNC: 3.7 G/DL (ref 3.4–5)
ALBUMIN/GLOB SERPL: 1 {RATIO} (ref 1–2)
ALP LIVER SERPL-CCNC: 73 U/L
ALT SERPL-CCNC: 24 U/L
ANION GAP SERPL CALC-SCNC: 7 MMOL/L (ref 0–18)
AST SERPL-CCNC: 19 U/L (ref 15–37)
BASOPHILS # BLD AUTO: 0.05 X10(3) UL (ref 0–0.2)
BASOPHILS NFR BLD AUTO: 1 %
BILIRUB SERPL-MCNC: 0.3 MG/DL (ref 0.1–2)
BUN BLD-MCNC: 15 MG/DL (ref 9–23)
CALCIUM BLD-MCNC: 9.6 MG/DL (ref 8.5–10.1)
CHLORIDE SERPL-SCNC: 108 MMOL/L (ref 98–112)
CHOLEST SERPL-MCNC: 229 MG/DL (ref ?–200)
CO2 SERPL-SCNC: 26 MMOL/L (ref 21–32)
CREAT BLD-MCNC: 0.8 MG/DL
EGFRCR SERPLBLD CKD-EPI 2021: 77 ML/MIN/1.73M2 (ref 60–?)
EOSINOPHIL # BLD AUTO: 0.17 X10(3) UL (ref 0–0.7)
EOSINOPHIL NFR BLD AUTO: 3.3 %
ERYTHROCYTE [DISTWIDTH] IN BLOOD BY AUTOMATED COUNT: 13.6 %
EST. AVERAGE GLUCOSE BLD GHB EST-MCNC: 108 MG/DL (ref 68–126)
FASTING PATIENT LIPID ANSWER: YES
FASTING STATUS PATIENT QL REPORTED: YES
GLOBULIN PLAS-MCNC: 3.6 G/DL (ref 2.8–4.4)
GLUCOSE BLD-MCNC: 88 MG/DL (ref 70–99)
HBA1C MFR BLD: 5.4 % (ref ?–5.7)
HCT VFR BLD AUTO: 44.3 %
HDLC SERPL-MCNC: 68 MG/DL (ref 40–59)
HGB BLD-MCNC: 14.4 G/DL
IMM GRANULOCYTES # BLD AUTO: 0.03 X10(3) UL (ref 0–1)
IMM GRANULOCYTES NFR BLD: 0.6 %
LDLC SERPL CALC-MCNC: 142 MG/DL (ref ?–100)
LYMPHOCYTES # BLD AUTO: 2.24 X10(3) UL (ref 1–4)
LYMPHOCYTES NFR BLD AUTO: 43.1 %
MCH RBC QN AUTO: 29.8 PG (ref 26–34)
MCHC RBC AUTO-ENTMCNC: 32.5 G/DL (ref 31–37)
MCV RBC AUTO: 91.5 FL
MONOCYTES # BLD AUTO: 0.46 X10(3) UL (ref 0.1–1)
MONOCYTES NFR BLD AUTO: 8.8 %
NEUTROPHILS # BLD AUTO: 2.25 X10 (3) UL (ref 1.5–7.7)
NEUTROPHILS # BLD AUTO: 2.25 X10(3) UL (ref 1.5–7.7)
NEUTROPHILS NFR BLD AUTO: 43.2 %
NONHDLC SERPL-MCNC: 161 MG/DL (ref ?–130)
OSMOLALITY SERPL CALC.SUM OF ELEC: 292 MOSM/KG (ref 275–295)
PLATELET # BLD AUTO: 272 10(3)UL (ref 150–450)
POTASSIUM SERPL-SCNC: 4.1 MMOL/L (ref 3.5–5.1)
PROT SERPL-MCNC: 7.3 G/DL (ref 6.4–8.2)
RBC # BLD AUTO: 4.84 X10(6)UL
SODIUM SERPL-SCNC: 141 MMOL/L (ref 136–145)
TRIGL SERPL-MCNC: 108 MG/DL (ref 30–149)
TSI SER-ACNC: 0.91 MIU/ML (ref 0.36–3.74)
VIT D+METAB SERPL-MCNC: 49.2 NG/ML (ref 30–100)
VLDLC SERPL CALC-MCNC: 20 MG/DL (ref 0–30)
WBC # BLD AUTO: 5.2 X10(3) UL (ref 4–11)

## 2024-04-10 PROCEDURE — 80061 LIPID PANEL: CPT

## 2024-04-10 PROCEDURE — 85025 COMPLETE CBC W/AUTO DIFF WBC: CPT

## 2024-04-10 PROCEDURE — 80053 COMPREHEN METABOLIC PANEL: CPT

## 2024-04-10 PROCEDURE — 36415 COLL VENOUS BLD VENIPUNCTURE: CPT

## 2024-04-10 PROCEDURE — 84443 ASSAY THYROID STIM HORMONE: CPT

## 2024-04-10 PROCEDURE — 83036 HEMOGLOBIN GLYCOSYLATED A1C: CPT

## 2024-04-10 PROCEDURE — 82306 VITAMIN D 25 HYDROXY: CPT

## 2024-04-19 ENCOUNTER — PATIENT MESSAGE (OUTPATIENT)
Dept: FAMILY MEDICINE CLINIC | Facility: CLINIC | Age: 74
End: 2024-04-19

## 2024-04-22 NOTE — TELEPHONE ENCOUNTER
From: Modesta Blue  To: Ashish Helton  Sent: 4/19/2024 4:06 PM CDT  Subject: test results    I received the lab results but nothing further, not sure how to interrupt. Please advise.    (3) no apparent problem

## 2024-04-24 NOTE — TELEPHONE ENCOUNTER
Scheduled    Future Appointments   Date Time Provider Department Center   4/29/2024  9:00 AM Ashish Helton, DO [facility]   10/21/2024  9:00 AM Ashish Helton, DO [facility]   10/29/2024  9:10 AM Jackeline Moe PA-C [facility] None

## 2024-04-29 ENCOUNTER — OFFICE VISIT (OUTPATIENT)
Dept: FAMILY MEDICINE CLINIC | Facility: CLINIC | Age: 74
End: 2024-04-29
Payer: COMMERCIAL

## 2024-04-29 VITALS
OXYGEN SATURATION: 97 % | TEMPERATURE: 97 F | HEIGHT: 66 IN | HEART RATE: 76 BPM | RESPIRATION RATE: 16 BRPM | WEIGHT: 180 LBS | BODY MASS INDEX: 28.93 KG/M2 | SYSTOLIC BLOOD PRESSURE: 120 MMHG | DIASTOLIC BLOOD PRESSURE: 66 MMHG

## 2024-04-29 DIAGNOSIS — E78.00 PURE HYPERCHOLESTEROLEMIA: Primary | ICD-10-CM

## 2024-04-29 PROCEDURE — 1159F MED LIST DOCD IN RCRD: CPT | Performed by: FAMILY MEDICINE

## 2024-04-29 PROCEDURE — 3074F SYST BP LT 130 MM HG: CPT | Performed by: FAMILY MEDICINE

## 2024-04-29 PROCEDURE — 3008F BODY MASS INDEX DOCD: CPT | Performed by: FAMILY MEDICINE

## 2024-04-29 PROCEDURE — 99213 OFFICE O/P EST LOW 20 MIN: CPT | Performed by: FAMILY MEDICINE

## 2024-04-29 PROCEDURE — 3078F DIAST BP <80 MM HG: CPT | Performed by: FAMILY MEDICINE

## 2024-04-29 RX ORDER — ROSUVASTATIN CALCIUM 5 MG/1
5 TABLET, COATED ORAL NIGHTLY
Qty: 30 TABLET | Refills: 2 | Status: SHIPPED | OUTPATIENT
Start: 2024-04-29

## 2024-04-29 NOTE — PROGRESS NOTES
Subjective:   Patient ID: Modesta Blue is a 73 year old female.    HPI  73yr old female presents for f/u on recent labs. No other concerns today.     History/Other:   Review of Systems   Respiratory:  Negative for shortness of breath.    Cardiovascular:  Negative for chest pain and palpitations.     Current Outpatient Medications   Medication Sig Dispense Refill    rosuvastatin 5 MG Oral Tab Take 1 tablet (5 mg total) by mouth nightly. 30 tablet 2    Meloxicam 15 MG Oral Tab Take 1 tablet (15 mg total) by mouth every morning.      Ibandronate Sodium 150 MG Oral Tab Take 1 tablet (150 mg total) by mouth every 30 (thirty) days. Use as directed 3 tablet 3    CRANBERRY OR Take by mouth As Directed.      Vibegron (GEMTESA) 75 MG Oral Tab Take 75 mg by mouth daily.      Glucosamine-Chondroit-Vit C-Mn (GLUCOSAMINE CHONDR 500 COMPLEX OR) Take by mouth.      estradiol (ESTRACE) 0.1 MG/GM Vaginal Cream Apply 1/2  gram vaginally 2 times per week. 42.5 g 3    Calcium Citrate 950 (200 Ca) MG Oral Tab Take 1 tablet (950 mg total) by mouth daily.      cholecalciferol (VITAMIN D3) 125 MCG (5000 UT) Oral Cap Take 1 capsule (5,000 Units total) by mouth daily.      Esomeprazole Magnesium 20 MG Oral Capsule Delayed Release Take 1 capsule (20 mg total) by mouth every morning before breakfast.      Multiple Vitamins-Minerals (CENTRUM VITAMINTS OR) Take by mouth.       Allergies:  Allergies   Allergen Reactions    Bee Venom SWELLING       Objective:   Physical Exam  Vitals and nursing note reviewed.   Constitutional:       Appearance: Normal appearance.   HENT:      Head: Normocephalic and atraumatic.   Cardiovascular:      Rate and Rhythm: Normal rate and regular rhythm.   Pulmonary:      Effort: Pulmonary effort is normal.      Breath sounds: Normal breath sounds. No wheezing, rhonchi or rales.   Skin:     General: Skin is warm and dry.   Neurological:      Mental Status: She is alert.   Psychiatric:         Mood and Affect: Mood  normal.         Behavior: Behavior normal.         Assessment & Plan:   1. Pure hypercholesterolemia    - lipid panel: , HDL 68,  and   - based on ASCVD risk 19.9%   - will start rosuvastatin 5mg po at bedtime, reviewed indications, dosing and SEs   - cont with low chol diet and regular exercise  - repeat cmp and lipid panel in 3mo  - she understands and agrees with tx plan  - RTC in 3mo, sooner if needed     Orders Placed This Encounter   Procedures    Comp Metabolic Panel (14) [E]    Lipid Panel [E]       Meds This Visit:  Requested Prescriptions     Signed Prescriptions Disp Refills    rosuvastatin 5 MG Oral Tab 30 tablet 2     Sig: Take 1 tablet (5 mg total) by mouth nightly.       Imaging & Referrals:  None

## 2024-07-23 RX ORDER — ROSUVASTATIN CALCIUM 5 MG/1
5 TABLET, COATED ORAL NIGHTLY
Qty: 90 TABLET | Refills: 3 | Status: SHIPPED | OUTPATIENT
Start: 2024-07-23

## 2024-07-23 NOTE — TELEPHONE ENCOUNTER
Refill passed per [facility] protocol.     Requested Prescriptions   Pending Prescriptions Disp Refills    ROSUVASTATIN 5 MG Oral Tab [Pharmacy Med Name: Rosuvastatin Calcium 5 Mg Tab Nort] 30 tablet 0     Sig: Take 1 tablet (5 mg total) by mouth nightly.       Cholesterol Medication Protocol Passed - 7/20/2024  3:01 AM        Passed - ALT < 80     Lab Results   Component Value Date    ALT 24 04/10/2024             Passed - ALT resulted within past year        Passed - Lipid panel within past 12 months     Lab Results   Component Value Date    CHOLEST 229 (H) 04/10/2024    TRIG 108 04/10/2024    HDL 68 (H) 04/10/2024     (H) 04/10/2024    VLDL 20 04/10/2024    TCHDLRATIO 2.67 12/07/2016    NONHDLC 161 (H) 04/10/2024             Passed - In person appointment or virtual visit in the past 12 mos or appointment in next 3 mos     Recent Outpatient Visits              2 months ago Pure hypercholesterolemia    [facility], [address] [place] [address] Ashish Helton,     Office Visit    3 months ago Encounter for annual health examination    [address] Ashish Helton DO    Office Visit    7 months ago Detrusor instability    [facility] Jackeline Moe PA-C    Virtual Phone E/M    8 months ago COVID-19 virus infection    [address] Dari Pearson MD    Telemedicine    9 months ago Detrusor instability    [facility] Jackeline Moe PA-C    Office Visit          Future Appointments         Provider Department Appt Notes    In 4 days REF [facility], [address] [place] [address] Lab orders in Epic for Dr. Helton: 4/29/24-BNP    In 3 months Ashish Helton DO [address] Follow-up disposition: Return in 6 months    In 3 months [facility]K XR [facility]; [facility] DEXA [facility] - [address] Last Dexa: 10/24/22-BNP    In 3 months [facility] - [address] Last Mammo 10/18/23-BNP    In 3 months Jackeline Moe, [facility] [place] [facility] 1 YEAR UUI/UGA FOLLOW-UP

## 2024-07-25 RX ORDER — ROSUVASTATIN CALCIUM 5 MG/1
5 TABLET, COATED ORAL NIGHTLY
Qty: 30 TABLET | Refills: 2 | OUTPATIENT
Start: 2024-07-25

## 2024-07-27 ENCOUNTER — LAB ENCOUNTER (OUTPATIENT)
Dept: LAB | Age: 74
End: 2024-07-27
Attending: FAMILY MEDICINE
Payer: MEDICARE

## 2024-07-27 DIAGNOSIS — E78.00 PURE HYPERCHOLESTEROLEMIA: ICD-10-CM

## 2024-07-27 LAB
ALBUMIN SERPL-MCNC: 4.3 G/DL (ref 3.2–4.8)
ALBUMIN/GLOB SERPL: 1.6 {RATIO} (ref 1–2)
ALP LIVER SERPL-CCNC: 55 U/L
ALT SERPL-CCNC: 18 U/L
ANION GAP SERPL CALC-SCNC: 7 MMOL/L (ref 0–18)
AST SERPL-CCNC: <8 U/L (ref ?–34)
BILIRUB SERPL-MCNC: 0.4 MG/DL (ref 0.2–1.1)
BUN BLD-MCNC: 11 MG/DL (ref 9–23)
CALCIUM BLD-MCNC: 10.4 MG/DL (ref 8.7–10.4)
CHLORIDE SERPL-SCNC: 108 MMOL/L (ref 98–112)
CHOLEST SERPL-MCNC: 176 MG/DL (ref ?–200)
CO2 SERPL-SCNC: 27 MMOL/L (ref 21–32)
CREAT BLD-MCNC: 0.86 MG/DL
EGFRCR SERPLBLD CKD-EPI 2021: 71 ML/MIN/1.73M2 (ref 60–?)
FASTING PATIENT LIPID ANSWER: YES
FASTING STATUS PATIENT QL REPORTED: YES
GLOBULIN PLAS-MCNC: 2.7 G/DL (ref 2–3.5)
GLUCOSE BLD-MCNC: 86 MG/DL (ref 70–99)
HDLC SERPL-MCNC: 81 MG/DL (ref 40–59)
LDLC SERPL CALC-MCNC: 81 MG/DL (ref ?–100)
NONHDLC SERPL-MCNC: 95 MG/DL (ref ?–130)
OSMOLALITY SERPL CALC.SUM OF ELEC: 293 MOSM/KG (ref 275–295)
POTASSIUM SERPL-SCNC: 4.1 MMOL/L (ref 3.5–5.1)
PROT SERPL-MCNC: 7 G/DL (ref 5.7–8.2)
SODIUM SERPL-SCNC: 142 MMOL/L (ref 136–145)
TRIGL SERPL-MCNC: 78 MG/DL (ref 30–149)
VLDLC SERPL CALC-MCNC: 12 MG/DL (ref 0–30)

## 2024-07-27 PROCEDURE — 80061 LIPID PANEL: CPT

## 2024-07-27 PROCEDURE — 36415 COLL VENOUS BLD VENIPUNCTURE: CPT

## 2024-07-27 PROCEDURE — 80053 COMPREHEN METABOLIC PANEL: CPT

## 2024-08-22 ENCOUNTER — MED REC SCAN ONLY (OUTPATIENT)
Dept: FAMILY MEDICINE CLINIC | Facility: CLINIC | Age: 74
End: 2024-08-22

## 2024-10-08 ENCOUNTER — NURSE TRIAGE (OUTPATIENT)
Dept: INTERNAL MEDICINE CLINIC | Facility: CLINIC | Age: 74
End: 2024-10-08

## 2024-10-08 ENCOUNTER — OFFICE VISIT (OUTPATIENT)
Dept: FAMILY MEDICINE CLINIC | Facility: CLINIC | Age: 74
End: 2024-10-08
Payer: COMMERCIAL

## 2024-10-08 VITALS
OXYGEN SATURATION: 97 % | SYSTOLIC BLOOD PRESSURE: 112 MMHG | BODY MASS INDEX: 27.78 KG/M2 | WEIGHT: 177 LBS | DIASTOLIC BLOOD PRESSURE: 64 MMHG | HEART RATE: 76 BPM | TEMPERATURE: 97 F | HEIGHT: 67 IN | RESPIRATION RATE: 16 BRPM

## 2024-10-08 DIAGNOSIS — L73.9 FOLLICULITIS: Primary | ICD-10-CM

## 2024-10-08 DIAGNOSIS — R21 RASH: ICD-10-CM

## 2024-10-08 PROCEDURE — 99213 OFFICE O/P EST LOW 20 MIN: CPT | Performed by: NURSE PRACTITIONER

## 2024-10-08 PROCEDURE — 1160F RVW MEDS BY RX/DR IN RCRD: CPT | Performed by: NURSE PRACTITIONER

## 2024-10-08 PROCEDURE — 3078F DIAST BP <80 MM HG: CPT | Performed by: NURSE PRACTITIONER

## 2024-10-08 PROCEDURE — 3008F BODY MASS INDEX DOCD: CPT | Performed by: NURSE PRACTITIONER

## 2024-10-08 PROCEDURE — 1159F MED LIST DOCD IN RCRD: CPT | Performed by: NURSE PRACTITIONER

## 2024-10-08 PROCEDURE — 3074F SYST BP LT 130 MM HG: CPT | Performed by: NURSE PRACTITIONER

## 2024-10-08 RX ORDER — TRIAMCINOLONE ACETONIDE 1 MG/G
CREAM TOPICAL 2 TIMES DAILY
Qty: 80 G | Refills: 0 | Status: SHIPPED | OUTPATIENT
Start: 2024-10-08 | End: 2024-10-18

## 2024-10-08 RX ORDER — CEPHALEXIN 500 MG/1
500 CAPSULE ORAL 2 TIMES DAILY
Qty: 14 CAPSULE | Refills: 0 | Status: SHIPPED | OUTPATIENT
Start: 2024-10-08 | End: 2024-10-15

## 2024-10-08 NOTE — PROGRESS NOTES
CHIEF COMPLAINT:     Chief Complaint   Patient presents with    Rash     Saturday, front of both calves, redness, itcy, painful  OTC benedryl          HPI:    Modesta Blue is a 74 year old female who presents for evaluation of a rash.  Per patient rash started in the past 4 days. Rash has been worsening since onset.  Patient denies similar rash in the past. The rash is characterized by red bumps. The affected locations include bilateral shins. Patient has treated rash with benadryl.  Associated symptoms include: + pruritis, + tenderness.  Exposure: no exposure to new skin/laundry products, food, or chemicals.  no recent viral illness or infection.    Pertinent negatives include no rash drainage, anorexia, congestion, cough, diarrhea, eye pain, facial edema, fatigue, fever, joint pain, rhinorrhea, shortness of breath, sore throat or vomiting.      Current Outpatient Medications   Medication Sig Dispense Refill    cephALEXin 500 MG Oral Cap Take 1 capsule (500 mg total) by mouth 2 (two) times daily for 7 days. 14 capsule 0    triamcinolone 0.1 % External Cream Apply topically 2 (two) times daily for 10 days. Apply thin layer to bilateral legs 80 g 0    rosuvastatin 5 MG Oral Tab Take 1 tablet (5 mg total) by mouth nightly. 90 tablet 3    Meloxicam 15 MG Oral Tab Take 1 tablet (15 mg total) by mouth every morning.      Ibandronate Sodium 150 MG Oral Tab Take 1 tablet (150 mg total) by mouth every 30 (thirty) days. Use as directed 3 tablet 3    CRANBERRY OR Take by mouth As Directed.      Vibegron (GEMTESA) 75 MG Oral Tab Take 75 mg by mouth daily.      Glucosamine-Chondroit-Vit C-Mn (GLUCOSAMINE CHONDR 500 COMPLEX OR) Take by mouth.      estradiol (ESTRACE) 0.1 MG/GM Vaginal Cream Apply 1/2  gram vaginally 2 times per week. 42.5 g 3    Calcium Citrate 950 (200 Ca) MG Oral Tab Take 1 tablet (950 mg total) by mouth daily.      cholecalciferol (VITAMIN D3) 125 MCG (5000 UT) Oral Cap Take 1 capsule (5,000 Units  total) by mouth daily.      Esomeprazole Magnesium 20 MG Oral Capsule Delayed Release Take 1 capsule (20 mg total) by mouth every morning before breakfast.      Multiple Vitamins-Minerals (CENTRUM VITAMINTS OR) Take by mouth.        Past Medical History:    Abdominal distention    Abdominal pain    Allergic rhinitis    not offically diagnosed but occassionally annoying    Arthritis    Atrial fibrillation (HCC)    Back pain    Belching    Bloating    Change in hair    Constipation    Diarrhea, unspecified    Easy bruising    Esophageal reflux    Family history of malignant neoplasm of urinary bladder    Flatulence/gas pain/belching    Frequent urination    Generalized osteoarthrosis, involving multiple sites    OA hands, R knee    Heartburn    Hematuria, unspecified    Hemorrhoids    History of miscarriage    x2    Hx of motion sickness    Hypercalcemia    Indigestion    Irregular bowel habits    Leaking of urine    Lipoma    hand    Menopause    age [age]    Need for prophylactic vaccination and inoculation against influenza    Night sweats     (normal spontaneous vaginal delivery) (HCC)    x2    Osteoarthritis    normal aging    Osteopenia    quit fosamax after 1 year    Pain in joint, multiple sites    Pain in joints    Polydipsia    Problems with swallowing    Pure hypercholesterolemia    Routine general medical examination at a health care facility    Routine gynecological examination    Screening for diabetes mellitus    Screening for lipoid disorders    Screening for malignant neoplasm of the rectum    Screening for other and unspecified deficiency anemia    Screening for thyroid disorder    Sleep disturbance    Special screening for malignant neoplasms, colon    Stool incontinence    Urinary tract infection, site not specified    Varicella    \"varicella strep\"    Visual impairment    glasses    Wears glasses      Past Surgical History:   Procedure Laterality Date    Cataract      Colectomy  2021     Colonoscopy      Colonoscopy N/A 04/15/2022    Procedure: COLONOSCOPY/ESOPHAGOGASTRODUODENOSCOPY (EGD);  Surgeon: Stephani Farah MD;  Location:  ENDOSCOPY    D & c      after miscarriage in my 20's    Glaucoma surgery            Other  2021    drain pelvic abcess    Other surgical history  colon sectioning       Family History   Problem Relation Age of Onset    Stroke Mother         Alzheimers    Neurological Disorder Mother     Dementia Mother         Alzheimers    Arthritis Mother     Eye Problems Mother         cataract    Glaucoma Mother     Other (osteoporosis) Mother     Cancer Father         liver cancer    Alcohol and Other Disorders Associated Father     Arthritis Father     Ear Problems Father         hearing loss    Other (digestive disorder) Father         liver, ulcer bronchitis    Colon Polyps Father     Cancer Sister         bladder ca    Hypertension Sister     Cancer Sister     Thyroid Disorder Sister         thyroid/endocrine    Cancer Brother         Bladder Cancer    Breast Cancer Daughter 51    Cancer Daughter         Breast Cancer    Cancer Other         skin ca, melanoma, basal cell, grandmother    Heart Disease Other         CAD, fam hx    Stroke Other         cerebrovascular disease, CVA, aunts, uncle    High Cholesterol Other         fam hx    Neurological Disorder Other         aunts    Dementia Other         vascular, aunts    Arthritis Other         aunts, uncles      Social History     Socioeconomic History    Marital status:     Number of children: 2   Occupational History    Occupation: Volunteer   Tobacco Use    Smoking status: Never     Passive exposure: Never    Smokeless tobacco: Never   Vaping Use    Vaping status: Never Used   Substance and Sexual Activity    Alcohol use: Yes     Alcohol/week: 1.0 standard drink of alcohol     Types: 1 Standard drinks or equivalent per week     Comment: social drinking    Drug use: No    Sexual activity: Yes      Partners: Male   Other Topics Concern    Blood Transfusions No    Caffeine Concern No    Occupational Exposure No    Hobby Hazards No    Sleep Concern Yes     Comment: awakens often at night    Stress Concern No    Weight Concern Yes     Comment: could always loose some weight    Special Diet No    Exercise Yes    Seat Belt Yes   Social History Narrative    Volunteers. Children 40 & 43, six grandchildren. No pets.     Social Determinants of Health      [facility], [facility]    Housing Stability         REVIEW OF SYSTEMS:   GENERAL: feels well otherwise, no fever, no chills.  SKIN: Per HPI. No edema. No ulcerations.  EYES: Denies blurred vision or double vision  HEENT: Denies rhinorrhea, edema of the lips or swelling of throat.  CARDIOVASCULAR: Denies chest pains or palpitations.  LUNGS: Denies shortness of breath with exertion or rest. No cough or wheezing.  LYMPH: Denies enlargement of the lymph nodes.  MUSC/SKEL: Denies joint swelling or joint stiffness.  GI: Denies abdominal pain, N/V/C/D.  NEURO: Denies abnormal sensation, tingling of the skin, or numbness.      EXAM:   /64   Pulse 76   Temp 97.4 °F (36.3 °C)   Resp 16   Ht 5' 7\" (1.702 m)   Wt 177 lb (80.3 kg)   SpO2 97%   BMI 27.72 kg/m²   GENERAL: well developed, well nourished,in no apparent distress  SKIN: Rash/lesion(s): erythematous papules;  located bilateral shins; + follicular based;  no tenderness; no drainage; + blanching  EYES: PERRLA, EOMI, conjunctiva are clear  HENT: Head atraumatic, normocephalic. Normal external nose. Nasal mucosa pink without edema. No erythema of the throat. Oropharynx moist without lesions.  NECK:  Supple. Non tender.  LUNGS: Clear to auscultation bilaterally.  No wheezing, rhonchi, or rales.  No diminished breath sounds. No increased work of breathing.   CARDIO: RRR without murmur  LYMPH: No lymphadenopathy.       ASSESSMENT AND PLAN:   Modesta Blue  is a 74 year old female who presents for evaluation of a rash. Findings are consistent with:    ASSESSMENT:  Encounter Diagnoses   Name Primary?    Folliculitis Yes    Rash      1. Folliculitis  - cephALEXin 500 MG Oral Cap; Take 1 capsule (500 mg total) by mouth 2 (two) times daily for 7 days.  Dispense: 14 capsule; Refill: 0  - triamcinolone 0.1 % External Cream; Apply topically 2 (two) times daily for 10 days. Apply thin layer to bilateral legs  Dispense: 80 g; Refill: 0    2. Rash  - cephALEXin 500 MG Oral Cap; Take 1 capsule (500 mg total) by mouth 2 (two) times daily for 7 days.  Dispense: 14 capsule; Refill: 0  - triamcinolone 0.1 % External Cream; Apply topically 2 (two) times daily for 10 days. Apply thin layer to bilateral legs  Dispense: 80 g; Refill: 0    Zyrtec daily. Benadryl prn  Topical steroid added to help with itching.   If no improvement f/u with dermatology    PLAN: Meds and instructions as listed below.  Comfort measures as described in Patient Instructions.  Skin care discussed with patient.     Meds & Refills for this Visit:  Requested Prescriptions     Signed Prescriptions Disp Refills    cephALEXin 500 MG Oral Cap 14 capsule 0     Sig: Take 1 capsule (500 mg total) by mouth 2 (two) times daily for 7 days.    triamcinolone 0.1 % External Cream 80 g 0     Sig: Apply topically 2 (two) times daily for 10 days. Apply thin layer to bilateral legs       Risks, benefits, and side effects of medication explained and discussed.      The patient indicates understanding of these issues and agrees to the plan.

## 2024-10-08 NOTE — TELEPHONE ENCOUNTER
Action Requested: Summary for Provider     []  Critical Lab, Recommendations Needed  [] Need Additional Advice  []   FYI    []   Need Orders  [] Need Medications Sent to Pharmacy  []  Other     SUMMARY: Patient reports a rash on bilateral legs from knees to feet, back of knees, started 3 days ago. Skin is raised and bumpy, moderate itching. Denies pain, fever, blisters, drainage. Patient using topical and oral Benadryl, calamine lotion. She is requesting an appointment today. Directed patient to walk in clinic for immediate evaluate. She confirms understanding and will go.      Reason for call: Rash  Onset: 3 days       Reason for Disposition   Patient wants to be seen    Protocols used: Rash or Redness - [id number]

## 2024-10-22 ENCOUNTER — TELEPHONE (OUTPATIENT)
Dept: UROLOGY | Facility: CLINIC | Age: 74
End: 2024-10-22

## 2024-10-23 ENCOUNTER — LAB ENCOUNTER (OUTPATIENT)
Dept: LAB | Age: 74
End: 2024-10-23
Attending: OBSTETRICS & GYNECOLOGY
Payer: MEDICARE

## 2024-10-23 ENCOUNTER — TELEPHONE (OUTPATIENT)
Dept: UROLOGY | Facility: CLINIC | Age: 74
End: 2024-10-23

## 2024-10-23 DIAGNOSIS — R30.0 DYSURIA: ICD-10-CM

## 2024-10-23 DIAGNOSIS — R30.0 DYSURIA: Primary | ICD-10-CM

## 2024-10-23 PROCEDURE — 87086 URINE CULTURE/COLONY COUNT: CPT

## 2024-10-23 RX ORDER — NITROFURANTOIN 25; 75 MG/1; MG/1
100 CAPSULE ORAL 2 TIMES DAILY
Qty: 14 CAPSULE | Refills: 0 | Status: SHIPPED | OUTPATIENT
Start: 2024-10-23

## 2024-10-23 NOTE — TELEPHONE ENCOUNTER
Telephone call received from patient.     Patient complains of UTI signs and symptoms including urgency, frequency, dysuria and gross hematuria x 1 day    Has previously had gross hematuria w/ UTIs per pt.    Denies fever    Allergies and previous cultures reviewed    Hx incomplete emptying: N    Using Estrace: Y     Recent ucxs:   9/20/22-no growth  1/29/21-no growth    Discussed with RUDY Strange, CAMILA Macrobid 100 mg PO bid x 7 days    Urine culture ordered, will test @  lab    Empiric antibiotics sent to patient's preferred pharmacy     Encouraged PO hydration    All questions answered    Encouraged to call if symptoms worsen or fail to improve     Will need hematuria work-up if ucx is no growth.    Patient understands and agrees to plan

## 2024-10-25 ENCOUNTER — HOSPITAL ENCOUNTER (OUTPATIENT)
Dept: BONE DENSITY | Age: 74
Discharge: HOME | End: 2024-10-25
Attending: FAMILY MEDICINE
Payer: MEDICARE

## 2024-10-25 ENCOUNTER — HOSPITAL ENCOUNTER (OUTPATIENT)
Dept: MAMMOGRAPHY | Age: 74
Discharge: HOME | End: 2024-10-25
Attending: FAMILY MEDICINE
Payer: MEDICARE

## 2024-10-25 DIAGNOSIS — Z78.0 POSTMENOPAUSAL: ICD-10-CM

## 2024-10-25 DIAGNOSIS — M81.0 OSTEOPOROSIS OF LUMBAR SPINE: ICD-10-CM

## 2024-10-25 DIAGNOSIS — Z12.31 VISIT FOR SCREENING MAMMOGRAM: ICD-10-CM

## 2024-10-25 PROCEDURE — 77063 BREAST TOMOSYNTHESIS BI: CPT | Performed by: FAMILY MEDICINE

## 2024-10-25 PROCEDURE — 77067 SCR MAMMO BI INCL CAD: CPT | Performed by: FAMILY MEDICINE

## 2024-10-25 PROCEDURE — 77080 DXA BONE DENSITY AXIAL: CPT | Performed by: FAMILY MEDICINE

## 2024-10-25 NOTE — TELEPHONE ENCOUNTER
TC from pt saying she understands her ucx showed no growth, but abx are helping her sx. Can she continue? Advised pt she can finish.  Pt verbalized an understanding and agrees w/ plan. All questions answered.

## 2024-10-28 NOTE — TELEPHONE ENCOUNTER
In light of no growth UCx and report of gross hematuria pt needs workup including US kidneys & office cysto.  Please order and call pt to notify her and schedule cysto.    TY

## 2024-10-29 ENCOUNTER — OFFICE VISIT (OUTPATIENT)
Dept: UROLOGY | Facility: CLINIC | Age: 74
End: 2024-10-29
Attending: OBSTETRICS & GYNECOLOGY
Payer: MEDICARE

## 2024-10-29 VITALS — BODY MASS INDEX: 28 KG/M2 | RESPIRATION RATE: 18 BRPM | HEIGHT: 67 IN

## 2024-10-29 DIAGNOSIS — R31.0 GROSS HEMATURIA: ICD-10-CM

## 2024-10-29 DIAGNOSIS — N95.2 POSTMENOPAUSAL ATROPHIC VAGINITIS: ICD-10-CM

## 2024-10-29 DIAGNOSIS — N81.84 PELVIC MUSCLE WASTING: ICD-10-CM

## 2024-10-29 DIAGNOSIS — N32.81 DETRUSOR INSTABILITY: Primary | ICD-10-CM

## 2024-10-29 PROCEDURE — 99212 OFFICE O/P EST SF 10 MIN: CPT

## 2024-10-29 RX ORDER — MIRABEGRON 50 MG/1
50 TABLET, FILM COATED, EXTENDED RELEASE ORAL DAILY
Qty: 30 TABLET | Refills: 11 | Status: SHIPPED | OUTPATIENT
Start: 2024-10-29

## 2024-10-29 NOTE — PROGRESS NOTES
Patient presents to follow up UUI/UGA    She is currently using Gemtesa 75 mg daily    She reports 90% improvement  Denies any significant side effects   Using vaginal estrogen cream twice a week   Denies any s/sx of UTI currently   Had gross hematuria on 10/23/24, ucx no growth, needs hematuria workup    Bowels constipated, takes fiber daily     UDS 11/16/22  IMPRESSION:   213/125cc & 504/22cc  intermediate 348cc  DO @130cc  CHARY @ 200 ml with cough       Has tried in the past:  PT  Trospium ER - minimal improvement, stopped after 3 days, PT told her to stop medication    Urogynecology Summary:  Urogynecology Summary  Prolapse: No  CHARY: Yes (rarely)  Urge Incontinence: Yes (sometimes at night)  Nocturia Frequency: 2 (1-2 times)  Frequency: Greater than 3 hours (every 3 hours)  Incomplete emptying: No  Constipation: Yes  Wears pad day?: 0  Wears Pad Night?: 1 (last 6 months)  Activities are limited by UI/POP?: No  Currently Sexually Active: Yes    Resp 18   Ht 67\"   BMI 27.72 kg/m²     GENERAL:  NAD  HEAD: NC/AT  EYES: symmetric bilaterally. No icterus. Sclera w/o injection  NECK: no masses  LUNGS:  Normal resp effort  ABDOMEN:  Soft, no mass  MUSK: normal gait, ROM wnl. No edema  PSYCH: A&Ox3. Recent and remote memory intact    PELVIC EXAM: CHANO Daniels in room as chaperone  Ext. Gen: +atrophy, no lesions  Urethra: +atrophy, nontender  Bladder:+fullness, nontender  Vagina: +atrophy  Cervix: no bleeding, no lesions, nontender  Uterus: +mobile  Adnexa:no masses, nontender  Perineum: nontender  Anus: wnl  Rectum: defer      PELVIC SUPPORT:  Marquez:  0  Ant:  1  Post:  0    Impression/Plan:    ICD-10-CM    1. Detrusor instability  N32.81 Mirabegron ER (MYRBETRIQ) 50 MG Oral Tablet 24 Hr      2. Gross hematuria  R31.0 US KIDNEYS (CPT=76775)      3. Pelvic muscle wasting  N81.84       4. Postmenopausal atrophic vaginitis  N95.2           Discussion Items:   Discussed dietary and behavioral modifications for mgmt of urinary  symptoms  Discussed weight management and benefits of weight loss on urinary symptoms  Reviewed AUA/SUFU guidelines on mgmt of non-neurogenic OAB  Discussed pharmacologic and nonpharmacologic mgmt options of urinary symptoms - reviewed risks, benefits, alternatives, and goals of treatment  Discussed specific risks related to OAB meds including, but not limited to dry mouth, constipation, blurry vision, cognitive changes, and BP elevation.  Discussed mgmt of vulvovaginal atrophy with vaginal estrogen cream. Reviewed associated benefits, risks, alternatives, and goals. Recommend low dose 2-3x weekly mgmt       Treatment Plan, Non-surgical:   Has good symptom improvement with Gemtesa, but expensive, will try Myrbetriq to see if more affordable, if not, continue Gemtesa  Recommend vaginal estrogen cream 2-3x/weekly  Gross hematuria workup - renal ultrasound ordered, will schedule in office cystoscopy with cytology with Dr Guzmán  Bladder diet/drill  Bowel regimen reviewed  Call with s/sx of UTI  All questions answered  She understands and agrees to plan    Return in about 6 weeks (around 12/10/2024) for UUI medication follow up, 1 year for UUI/UGA follow up, cystoscopy with Dr Guzmán, sooner prn .    Jackeline Moe PA-C    The 21st Century Cures Act makes medical notes like these available to patients in the interest of transparency. However, be advised this is a medical document. It is intended as peer to peer communication. It is written in medical language and may contain abbreviations or verbiage that are unfamiliar. It may appear blunt or direct. Medical documents are intended to carry relevant information, facts as evident, and the clinical opinion of the practitioner.

## 2024-10-30 ENCOUNTER — OFFICE VISIT (OUTPATIENT)
Dept: FAMILY MEDICINE CLINIC | Facility: CLINIC | Age: 74
End: 2024-10-30
Payer: COMMERCIAL

## 2024-10-30 VITALS
WEIGHT: 178 LBS | HEIGHT: 66 IN | BODY MASS INDEX: 28.61 KG/M2 | OXYGEN SATURATION: 99 % | TEMPERATURE: 98 F | RESPIRATION RATE: 16 BRPM | SYSTOLIC BLOOD PRESSURE: 120 MMHG | DIASTOLIC BLOOD PRESSURE: 70 MMHG | HEART RATE: 85 BPM

## 2024-10-30 DIAGNOSIS — E78.00 PURE HYPERCHOLESTEROLEMIA: Primary | ICD-10-CM

## 2024-10-30 DIAGNOSIS — K21.9 GASTROESOPHAGEAL REFLUX DISEASE WITHOUT ESOPHAGITIS: ICD-10-CM

## 2024-10-30 DIAGNOSIS — M85.80 OSTEOPENIA, UNSPECIFIED LOCATION: ICD-10-CM

## 2024-10-30 PROCEDURE — 1159F MED LIST DOCD IN RCRD: CPT | Performed by: FAMILY MEDICINE

## 2024-10-30 PROCEDURE — 99214 OFFICE O/P EST MOD 30 MIN: CPT | Performed by: FAMILY MEDICINE

## 2024-10-30 PROCEDURE — 3078F DIAST BP <80 MM HG: CPT | Performed by: FAMILY MEDICINE

## 2024-10-30 PROCEDURE — 3008F BODY MASS INDEX DOCD: CPT | Performed by: FAMILY MEDICINE

## 2024-10-30 PROCEDURE — 3074F SYST BP LT 130 MM HG: CPT | Performed by: FAMILY MEDICINE

## 2024-10-30 RX ORDER — VIBEGRON 75 MG/1
TABLET, FILM COATED ORAL
COMMUNITY

## 2024-10-30 RX ORDER — TRIAMCINOLONE ACETONIDE 1 MG/G
CREAM TOPICAL
COMMUNITY
Start: 2024-10-08

## 2024-10-30 NOTE — PROGRESS NOTES
Subjective:   Patient ID: Modesta Blue is a 74 year old female.    HPI  74yr old female presents for f/u on chronic conditions, meds and recent testing.  HL, taking rosuvastatin every other day, which she is tolerating better.   Osteoporosis, taking ibrandronate along with her calcium and vit d supplements. Had DEXA done and here to f/u on results.  GERD, stable. Taking nexium otc as needed.    History/Other:   Review of Systems   Respiratory:  Negative for shortness of breath.    Cardiovascular:  Negative for chest pain and palpitations.   Gastrointestinal:  Negative for anal bleeding, diarrhea and vomiting.   Neurological:  Negative for light-headedness and headaches.     Current Outpatient Medications   Medication Sig Dispense Refill    Vibegron (GEMTESA) 75 MG Oral Tab Take by mouth.      triamcinolone 0.1 % External Cream Apply topically 2 (two) times daily for 10 days. Apply thin layer to bilateral legs      rosuvastatin 5 MG Oral Tab Take 1 tablet (5 mg total) by mouth nightly. 90 tablet 3    Meloxicam 15 MG Oral Tab Take 1 tablet (15 mg total) by mouth as needed.      Ibandronate Sodium 150 MG Oral Tab Take 1 tablet (150 mg total) by mouth every 30 (thirty) days. Use as directed 3 tablet 3    CRANBERRY OR Take by mouth As Directed.      Glucosamine-Chondroit-Vit C-Mn (GLUCOSAMINE CHONDR 500 COMPLEX OR) Take by mouth.      estradiol (ESTRACE) 0.1 MG/GM Vaginal Cream Apply 1/2  gram vaginally 2 times per week. 42.5 g 3    cholecalciferol (VITAMIN D3) 125 MCG (5000 UT) Oral Cap Take 1 capsule (5,000 Units total) by mouth daily.      Esomeprazole Magnesium 20 MG Oral Capsule Delayed Release Take 1 capsule (20 mg total) by mouth every morning before breakfast.      Multiple Vitamins-Minerals (CENTRUM VITAMINTS OR) Take by mouth.      Mirabegron ER (MYRBETRIQ) 50 MG Oral Tablet 24 Hr Take 1 tablet (50 mg total) by mouth daily. (Patient not taking: Reported on 10/30/2024) 30 tablet 11    Calcium Citrate 950  (200 Ca) MG Oral Tab Take 1 tablet (950 mg total) by mouth daily. (Patient not taking: Reported on 10/30/2024)       Allergies:Allergies[1]    Objective:   Physical Exam  Vitals and nursing note reviewed.   Constitutional:       Appearance: Normal appearance.   HENT:      Head: Normocephalic and atraumatic.   Cardiovascular:      Rate and Rhythm: Normal rate and regular rhythm.   Pulmonary:      Effort: Pulmonary effort is normal.      Breath sounds: Normal breath sounds. No wheezing, rhonchi or rales.   Skin:     General: Skin is warm and dry.   Neurological:      Mental Status: She is alert and oriented to person, place, and time.   Psychiatric:         Mood and Affect: Mood normal.         Behavior: Behavior normal.       Assessment & Plan:   1. Pure hypercholesterolemia  - cont crestor 5mg po every other night   - follow low chol diet and regular exercise as able  - recheck lipid panel in 3mo  - Comp Metabolic Panel (14) [E]; Future  - Lipid Panel [E]; Future    2. Osteopenia, unspecified location  - DEXA (10/25/24): bone mineral density values for lumbar spine and femoral neck are compatible with the diagnosis of osteopenia by WHO definition.   - bone density has improved from addition of ibandronate  - will cont ibandronate, ca + vit d3 supplementation along with weight bearing exercises as able  - recheck DEXA in 2yrs    3. Gastroesophageal reflux disease without esophagitis  - stable  - cpm    Will obtain COVID and influenza vaccines at local pharmacy  She understands and agrees with tx plan  RTC in 6mo, sooner if needed      Orders Placed This Encounter   Procedures    Comp Metabolic Panel (14) [E]    Lipid Panel [E]       Meds This Visit:  Requested Prescriptions      No prescriptions requested or ordered in this encounter       Imaging & Referrals:  None         [1]   Allergies  Allergen Reactions    Bee Venom SWELLING

## 2024-11-06 ENCOUNTER — TELEPHONE (OUTPATIENT)
Dept: UROLOGY | Facility: CLINIC | Age: 74
End: 2024-11-06

## 2024-11-06 RX ORDER — MIRABEGRON 50 MG/1
50 TABLET, FILM COATED, EXTENDED RELEASE ORAL DAILY
Qty: 90 TABLET | Refills: 3 | Status: SHIPPED | OUTPATIENT
Start: 2024-11-06

## 2024-11-06 NOTE — TELEPHONE ENCOUNTER
Rx for Mirabegron written at pt's last appt with MAAME GRANT. Pt's plan only covers name brand Myrbetriq at this time. New Rx sent to pharmacy with note to Dispense as Written.

## 2024-11-11 NOTE — TELEPHONE ENCOUNTER
Returned call to pt about cost of medication. Discussed that her insurance formulary DOES cover Myrbetriq (name brand) which was sent to her pharmacy. The high co-pay cost is likely due to being in the Medicare donut hole and she has not yet met our out of pocket maximum for the year. States she has an appt after January 1st and will review her insurance formulary to see which bladder medications are covered to be discussed with PA at that time.

## 2024-11-26 ENCOUNTER — HOSPITAL ENCOUNTER (OUTPATIENT)
Dept: ULTRASOUND IMAGING | Age: 74
Discharge: HOME | End: 2024-11-26
Attending: PHYSICIAN ASSISTANT
Payer: MEDICARE

## 2024-11-26 DIAGNOSIS — R31.0 GROSS HEMATURIA: ICD-10-CM

## 2024-11-26 PROCEDURE — 76775 US EXAM ABDO BACK WALL LIM: CPT | Performed by: PHYSICIAN ASSISTANT

## 2024-11-27 ENCOUNTER — TELEPHONE (OUTPATIENT)
Dept: UROLOGY | Facility: CLINIC | Age: 74
End: 2024-11-27

## 2024-11-27 DIAGNOSIS — R93.429 ABNORMAL RENAL ULTRASOUND: Primary | ICD-10-CM

## 2024-11-27 DIAGNOSIS — N28.1 CYST OF RIGHT KIDNEY: ICD-10-CM

## 2024-11-27 NOTE — TELEPHONE ENCOUNTER
Jackeline Daily, PA reviewed renal ultrasound, TORB Please order CT urogram to further evaluate right kidney cyst.  Orders placed.  Called patient and notified of renal ultrasound report and new orders.  Provided central scheduling ph#.    Keep scheduled office cysto 12/10/24.  Answered all patient questions, she verbalizes understanding and agreeable to plan.

## 2024-12-03 ENCOUNTER — TELEPHONE (OUTPATIENT)
Dept: UROLOGY | Facility: CLINIC | Age: 74
End: 2024-12-03

## 2024-12-03 NOTE — TELEPHONE ENCOUNTER
I called [facility] at [phone].  Call ref #[id number].  Representative verified that CPT codes 04139 and 22043 do not require pre-authorization.  I relayed this information to the patient.

## 2024-12-04 ENCOUNTER — HOSPITAL ENCOUNTER (OUTPATIENT)
Dept: CT IMAGING | Age: 74
Discharge: HOME | End: 2024-12-04
Attending: PHYSICIAN ASSISTANT
Payer: MEDICARE

## 2024-12-04 DIAGNOSIS — N28.1 CYST OF RIGHT KIDNEY: ICD-10-CM

## 2024-12-04 DIAGNOSIS — R93.429 ABNORMAL RENAL ULTRASOUND: ICD-10-CM

## 2024-12-04 LAB
CREAT BLD-MCNC: 1 MG/DL
EGFRCR SERPLBLD CKD-EPI 2021: 59 ML/MIN/1.73M2 (ref 60–?)

## 2024-12-04 PROCEDURE — 82565 ASSAY OF CREATININE: CPT

## 2024-12-04 PROCEDURE — 74178 CT ABD&PLV WO CNTR FLWD CNTR: CPT | Performed by: PHYSICIAN ASSISTANT

## 2024-12-04 PROCEDURE — 76377 3D RENDER W/INTRP POSTPROCES: CPT | Performed by: PHYSICIAN ASSISTANT

## 2024-12-10 ENCOUNTER — OFFICE VISIT (OUTPATIENT)
Dept: UROLOGY | Facility: CLINIC | Age: 74
End: 2024-12-10
Attending: OBSTETRICS & GYNECOLOGY
Payer: MEDICARE

## 2024-12-10 VITALS — BODY MASS INDEX: 28.61 KG/M2 | HEIGHT: 66 IN | TEMPERATURE: 98 F | WEIGHT: 178 LBS

## 2024-12-10 DIAGNOSIS — R31.0 GROSS HEMATURIA: Primary | ICD-10-CM

## 2024-12-10 DIAGNOSIS — N39.41 URGE INCONTINENCE: ICD-10-CM

## 2024-12-10 DIAGNOSIS — N32.81 DETRUSOR INSTABILITY: ICD-10-CM

## 2024-12-10 PROCEDURE — 52000 CYSTOURETHROSCOPY: CPT

## 2024-12-10 PROCEDURE — 99212 OFFICE O/P EST SF 10 MIN: CPT

## 2024-12-10 PROCEDURE — 88108 CYTOPATH CONCENTRATE TECH: CPT | Performed by: OBSTETRICS & GYNECOLOGY

## 2024-12-10 RX ORDER — VIBEGRON 75 MG/1
1 TABLET, FILM COATED ORAL DAILY
Qty: 7 TABLET | Refills: 0 | Status: SHIPPED | OUTPATIENT
Start: 2024-12-10

## 2024-12-10 RX ORDER — LIDOCAINE HYDROCHLORIDE 20 MG/ML
10 JELLY TOPICAL ONCE
Status: COMPLETED | OUTPATIENT
Start: 2024-12-10 | End: 2024-12-10

## 2024-12-10 RX ADMIN — LIDOCAINE HYDROCHLORIDE 10 ML: 20 JELLY TOPICAL at 15:34:00

## 2024-12-10 NOTE — PATIENT INSTRUCTIONS
[facility] - [facility]  [address] [address]  PH: [phone]  FAX: [phone]     CYSTOSCOPY INSTRUCTIONS    What is a cystoscopy?  An examination of the inside of the bladder and urethra, the tube that carries urine from the bladder to the outside of the body.    How is it done?  The doctor will place a long, thin instrument that has an eyepiece on one end and a light on the other into the bladder to examine the lining of the bladder.    Why do I need this test?  The doctor may need to perform this procedure to find the cause of many urinary problems such as:  Frequent UTI's  Urinary frequency and urgency  Painful urination, chronic pelvic pain, of IC/Painful Bladder Syndrome  Urinary blockage or retention  Blood in urine (Hematuria)  Abnormal cells in urine    How do I prepare for this test?  You should eat and drink normally before this test and try to arrive with a \"comfortable full\" bladder.  The test should be uncomfortable but not excessively painful.  Arrive approximately fifteen minutes before your test and register at the .    How is a cystoscopy performed?  The nurse will have you get undressed and you will be asked to lie down on an exam table.  Local medication may be applied to take away sensation as the scope is inserted by the doctor.  The physician will gently insert the tube to look inside your bladder while a sterile water solution will run into the bladder during the exam.  You might feel an urge to urinate.  When the test is done, you will be allowed to get up and urinate in the bathroom.    How long does the test take?  Most procedures are about 15-30 minutes in length.    What do I do afterwards?  You may have a mild burning feeling when you urinate afterwards.  Some patients may have a small amount of blood when they wipe.  These symptoms should not last more than 24 hours.  Your doctor will discuss findings with you after  testing is complete.    For questions or concerns, please call [phone].[facility] - [facility]  [address] [address]  PH: [phone]  FAX: [phone]       Cystoscopy Discharge Instructions    You may have some mild discomfort today from your cystoscopy.  There may be mild burning when you urinate or you may see blood in your urine.  These problems should not last more than 24 hours.    Comfort measures:    Drink two 8oz glasses of water every hour for the next two hours  Tylenol or Ibuprofen may be taken as needed  A warm compress or sitz bath may be soothing    Signs of possible infection:    Fever above 100 degrees  Painful or burning urination  Urinary frequency or urgency  Blood in urine  Chills    If you experience any of these symptoms, please call the office or, if after hours, the on-call physician at [phone].    Patient Signature:    Date:

## 2024-12-10 NOTE — PROGRESS NOTES
[facility] at  [facility]  [address]  [facility] [address]  PH: [phone]  FAX: [phone]     Date Patient MRN   12/10/2024 Modesta Blue   [address] [place] [id number]      Dr. Maribel Guzmán,      Patient Name:  Modesta Blue   Patient : 1950 Patient Age: 74 year old   Referring Physician:  No ref. provider found    Vitals   Temp 97.8 °F (36.6 °C)   Ht 66\"   Wt 178 lb (80.7 kg)   BMI 28.73 kg/m²       Medications   Encounter Medications[1]      Allergies   Allergies were reviewed with positive findings listed below:  Bee venom      Chief Complaint   Chief Complaint   Patient presents with    Cystourethroscopy Procedure     Gross hematuria 10/23/24        URINALYSIS:  Leukocyte esterase urine   Date Value Ref Range Status   2022 Negative Negative Final     Nitrite Urine   Date Value Ref Range Status   2022 Negative Negative Final     Blood Urine   Date Value Ref Range Status   2022 Negative Negative Final         Procedure        PRE-OP DIAGNOSIS: gross hematuria    POST-OP DIAGNOSIS: same    ANESTHESIA: 2% Lidocaine gel    PROCEDURES:  After sterile prep with Betadine the urethral lumen was prepped via catheter injection of approx. 3 mL 2% Lidocaine gel.  The urethra was gently dilated to accommodate the cystoscope.  Infusion of sterile water via the cystoscope was performed to achieve a comfortable bladder distension to allow a full view.  A careful, systemic assessment of the complete lumen of the bladder and urethra was performed.    SPECIMENS: urine cytology    DESCRIPTION Bladder Trigone UV  Junction URETHRA       Proximal Mild Distal   Negative/NL x x x x x x   Squamous Metaplasia         Polyps         Diverticulum         Other (exudate, cysts)           Bladder Trabeculation: mild    Gross hematuria  Renal ultrasound cyst --> CT urogram, benign simple cyst. No  hydro. No stones.  Cytology pending, follow results  Cysto d/w pt    DO/UUI  Discussed dietary and behavioral modifications for mgmt of urinary symptoms  Discussed weight management and benefits of weight loss on urinary symptoms  Reviewed AUA/SUFU guidelines on mgmt of non-neurogenic OAB  Discussed pharmacologic and nonpharmacologic mgmt options of urinary symptoms - reviewed risks, benefits, alternatives, and goals of treatment  Discussed specific risks related to OAB meds including, but not limited to dry mouth, constipation, blurry vision, cognitive changes, and BP elevation.    Gemtesa 75mg daily  Look for insurance coverage of mirabegron 50mg 1/25    UGA - cont vag estrogen  Discussed mgmt of vulvovaginal atrophy with vaginal estrogen cream. Reviewed associated benefits, risks, alternatives, and goals. Recommend low dose twice weekly mgmt   F/u as scheduled with MARSHAL Moe      Electronically signed by Maribel Guzmán DO         [1]   Outpatient Encounter Medications as of 12/10/2024   Medication Sig Dispense Refill    MYRBETRIQ 50 MG Oral Tablet 24 Hr Take 1 tablet (50 mg total) by mouth daily. 90 tablet 3    triamcinolone 0.1 % External Cream Apply topically 2 (two) times daily for 10 days. Apply thin layer to bilateral legs      rosuvastatin 5 MG Oral Tab Take 1 tablet (5 mg total) by mouth nightly. 90 tablet 3    Meloxicam 15 MG Oral Tab Take 1 tablet (15 mg total) by mouth as needed.      Ibandronate Sodium 150 MG Oral Tab Take 1 tablet (150 mg total) by mouth every 30 (thirty) days. Use as directed 3 tablet 3    CRANBERRY OR Take by mouth As Directed.      Glucosamine-Chondroit-Vit C-Mn (GLUCOSAMINE CHONDR 500 COMPLEX OR) Take by mouth.      estradiol (ESTRACE) 0.1 MG/GM Vaginal Cream Apply 1/2  gram vaginally 2 times per week. 42.5 g 3    Calcium Citrate 950 (200 Ca) MG Oral Tab Take 1 tablet (950 mg total) by mouth daily. (Patient not taking: Reported on 10/30/2024)      cholecalciferol (VITAMIN D3) 125  MCG (5000 UT) Oral Cap Take 1 capsule (5,000 Units total) by mouth daily.      Esomeprazole Magnesium 20 MG Oral Capsule Delayed Release Take 1 capsule (20 mg total) by mouth every morning before breakfast.      Multiple Vitamins-Minerals (CENTRUM VITAMINTS OR) Take by mouth.       No facility-administered encounter medications on file as of 12/10/2024.

## 2024-12-13 ENCOUNTER — TELEPHONE (OUTPATIENT)
Dept: UROLOGY | Facility: CLINIC | Age: 74
End: 2024-12-13

## 2024-12-13 LAB — NON GYNE INTERPRETATION: NEGATIVE

## 2024-12-13 NOTE — TELEPHONE ENCOUNTER
Tc from pt looking for cytology results. Advised pt urine cytology was wnl. Pt verbalized an understanding and agrees w/ plan. All questions answered.

## 2025-01-03 ENCOUNTER — TELEPHONE (OUTPATIENT)
Dept: UROLOGY | Facility: CLINIC | Age: 75
End: 2025-01-03

## 2025-01-03 RX ORDER — MIRABEGRON 50 MG/1
50 TABLET, FILM COATED, EXTENDED RELEASE ORAL DAILY
Qty: 90 TABLET | Refills: 3 | Status: SHIPPED | OUTPATIENT
Start: 2025-01-03

## 2025-01-03 NOTE — TELEPHONE ENCOUNTER
Pt called for refill of OAB med.  Currently on Gemtesa, but states this is costly.    Per last visit note with Dr. Guzmán 12/10/24   Plan:  Gemtesa 75mg daily  Look for insurance coverage of mirabegron 50mg 1/25  Pt states she was supposed to request Rx for Mirabegron after the first of the year for generic insurance coverage.    Orders placed per last visit note. Pt will notify office with any concerns

## 2025-01-31 ENCOUNTER — OFFICE VISIT (OUTPATIENT)
Dept: FAMILY MEDICINE CLINIC | Facility: CLINIC | Age: 75
End: 2025-01-31
Payer: COMMERCIAL

## 2025-01-31 VITALS
SYSTOLIC BLOOD PRESSURE: 124 MMHG | OXYGEN SATURATION: 97 % | WEIGHT: 171.81 LBS | DIASTOLIC BLOOD PRESSURE: 78 MMHG | BODY MASS INDEX: 28 KG/M2 | RESPIRATION RATE: 18 BRPM | TEMPERATURE: 98 F | HEART RATE: 86 BPM

## 2025-01-31 DIAGNOSIS — Z20.822 SUSPECTED COVID-19 VIRUS INFECTION: Primary | ICD-10-CM

## 2025-01-31 DIAGNOSIS — J02.9 SORE THROAT: ICD-10-CM

## 2025-01-31 LAB
CONTROL LINE PRESENT WITH A CLEAR BACKGROUND (YES/NO): YES YES/NO
KIT LOT #: NORMAL NUMERIC
OPERATOR ID: NORMAL
RAPID SARS-COV-2 BY PCR: NOT DETECTED
STREP GRP A CUL-SCR: NEGATIVE

## 2025-01-31 PROCEDURE — 87880 STREP A ASSAY W/OPTIC: CPT | Performed by: NURSE PRACTITIONER

## 2025-01-31 PROCEDURE — 3074F SYST BP LT 130 MM HG: CPT | Performed by: NURSE PRACTITIONER

## 2025-01-31 PROCEDURE — 3078F DIAST BP <80 MM HG: CPT | Performed by: NURSE PRACTITIONER

## 2025-01-31 PROCEDURE — 1159F MED LIST DOCD IN RCRD: CPT | Performed by: NURSE PRACTITIONER

## 2025-01-31 PROCEDURE — U0002 COVID-19 LAB TEST NON-CDC: HCPCS | Performed by: NURSE PRACTITIONER

## 2025-01-31 PROCEDURE — 1160F RVW MEDS BY RX/DR IN RCRD: CPT | Performed by: NURSE PRACTITIONER

## 2025-01-31 PROCEDURE — 99213 OFFICE O/P EST LOW 20 MIN: CPT | Performed by: NURSE PRACTITIONER

## 2025-01-31 NOTE — PROGRESS NOTES
CHIEF COMPLAINT:     Chief Complaint   Patient presents with    Cold     Sore throat - Entered by patient  S/s for 3-4 days.  OTC meds taken.  Nasal congestion, sore throat, chest congestion, ear clogged.  Home covid test negative.         HPI:   Modesta Blue is a 74 year old female who presents for upper respiratory symptoms for  3 days. Patient reports sore throat, congestion. Symptoms have been consistent since onset.  Treating symptoms with OTC.  Home Covid negative.      Current Outpatient Medications   Medication Sig Dispense Refill    Vibegron (GEMTESA) 75 MG Oral Tab Take 1 tablet by mouth daily. 30 tablet 11    rosuvastatin 5 MG Oral Tab Take 1 tablet (5 mg total) by mouth nightly. 90 tablet 3    Meloxicam 15 MG Oral Tab Take 1 tablet (15 mg total) by mouth as needed.      Ibandronate Sodium 150 MG Oral Tab Take 1 tablet (150 mg total) by mouth every 30 (thirty) days. Use as directed 3 tablet 3    CRANBERRY OR Take by mouth As Directed.      Glucosamine-Chondroit-Vit C-Mn (GLUCOSAMINE CHONDR 500 COMPLEX OR) Take by mouth.      estradiol (ESTRACE) 0.1 MG/GM Vaginal Cream Apply 1/2  gram vaginally 2 times per week. 42.5 g 3    Calcium Citrate 950 (200 Ca) MG Oral Tab Take 1 tablet (950 mg total) by mouth daily. (Patient not taking: Reported on 10/30/2024)      cholecalciferol (VITAMIN D3) 125 MCG (5000 UT) Oral Cap Take 1 capsule (5,000 Units total) by mouth daily.      Esomeprazole Magnesium 20 MG Oral Capsule Delayed Release Take 1 capsule (20 mg total) by mouth every morning before breakfast.      Multiple Vitamins-Minerals (CENTRUM VITAMINTS OR) Take by mouth.        Past Medical History:    Abdominal distention    Abdominal pain    Allergic rhinitis    not offically diagnosed but occassionally annoying    Arthritis    Atrial fibrillation (HCC)    Back pain    Belching    Bloating    Change in hair    Constipation    Diarrhea, unspecified    Easy bruising    Esophageal reflux    Family history of  malignant neoplasm of urinary bladder    Flatulence/gas pain/belching    Frequent urination    Generalized osteoarthrosis, involving multiple sites    OA hands, R knee    Heartburn    Hematuria, unspecified    Hemorrhoids    History of miscarriage    x2    Hx of motion sickness    Hypercalcemia    Indigestion    Irregular bowel habits    Leaking of urine    Lipoma    hand    Menopause    age [age]    Need for prophylactic vaccination and inoculation against influenza    Night sweats     (normal spontaneous vaginal delivery) (HCC)    x2    Osteoarthritis    normal aging    Osteopenia    quit fosamax after 1 year    Pain in joint, multiple sites    Pain in joints    Polydipsia    Problems with swallowing    Pure hypercholesterolemia    Routine general medical examination at a health care facility    Routine gynecological examination    Screening for diabetes mellitus    Screening for lipoid disorders    Screening for malignant neoplasm of the rectum    Screening for other and unspecified deficiency anemia    Screening for thyroid disorder    Sleep disturbance    Special screening for malignant neoplasms, colon    Stool incontinence    Urinary tract infection, site not specified    Varicella    \"varicella strep\"    Visual impairment    glasses    Wears glasses      Past Surgical History:   Procedure Laterality Date    Cataract      Colectomy  2021    Colonoscopy      Colonoscopy N/A 04/15/2022    Procedure: COLONOSCOPY/ESOPHAGOGASTRODUODENOSCOPY (EGD);  Surgeon: Stephani Farah MD;  Location:  ENDOSCOPY    D & c      after miscarriage in my 20's    Glaucoma surgery            Other  2021    drain pelvic abcess    Other surgical history  colon sectioning          Social History     Socioeconomic History    Marital status:     Number of children: 2   Occupational History    Occupation: Volunteer   Tobacco Use    Smoking status: Never     Passive exposure: Never    Smokeless tobacco:  Never   Vaping Use    Vaping status: Never Used   Substance and Sexual Activity    Alcohol use: Yes     Alcohol/week: 1.0 standard drink of alcohol     Types: 1 Standard drinks or equivalent per week     Comment: social drinking    Drug use: No    Sexual activity: Yes     Partners: Male   Other Topics Concern    Blood Transfusions No    Caffeine Concern No    Occupational Exposure No    Hobby Hazards No    Sleep Concern Yes     Comment: awakens often at night    Stress Concern No    Weight Concern Yes     Comment: could always loose some weight    Special Diet No    Exercise Yes    Seat Belt Yes   Social History Narrative    Volunteers. Children 40 & 43, six grandchildren. No pets.     Social Drivers of Health      [facility]    Housing Stability         REVIEW OF SYSTEMS:   GENERAL: no change in appetite  SKIN: no rashes or abnormal skin lesions  HEENT: See HPI  LUNGS: See HPI  CARDIOVASCULAR: denies chest pain or palpitations   GI: denies N/V/C or abdominal pain      EXAM:   /78   Pulse 86   Temp 98.2 °F (36.8 °C) (Oral)   Resp 18   Wt 171 lb 12.8 oz (77.9 kg)   SpO2 97%   BMI 27.73 kg/m²   GENERAL: well developed, well nourished,in no apparent distress  SKIN: no rashes,no suspicious lesions  HEAD: atraumatic, normocephalic.  no tenderness on palpation of sinuses  EYES: conjunctiva clear, EOM intact  EARS: TM's without erythema, no bulging, no retraction,no fluid, bony landmarks visible  NOSE: Nostrils patent, clear nasal discharge, nasal mucosa inflamed   THROAT: Oral mucosa pink, moist. Posterior pharynx is mildly erythematous. no exudates. Tonsils 1/4.    NECK: Supple, non-tender  LUNGS: clear to auscultation bilaterally, no wheezes or rhonchi. Breathing is non labored.  CARDIO: RRR without murmur  EXTREMITIES: no cyanosis, clubbing or edema  LYMPH:  no cervical lymphadenopathy.      Recent Results (from the past 24 hours)   Rapid  Covid-19    Collection Time: 01/31/25  9:43 AM    Specimen: Nares   Result Value Ref Range    Rapid SARS-CoV-2 by PCR Not Detected Not Detected    POCT Lot Number [id number]     POCT Expiration Date 8/2/2025     POCT Procedure Control Control Valid Control Valid     ,179    Rapid Strep    Collection Time: 01/31/25 10:53 AM   Result Value Ref Range    Strep Grp A Screen negative Negative    Control Line Present with a clear background (yes/no) yes Yes/No    Kit Lot # [id number] Numeric    Kit Expiration Date 11/13/2025 Date         ASSESSMENT AND PLAN:   Modesta Blue is a 74 year old female who presents with upper respiratory symptoms that are consistent with    ASSESSMENT:   Encounter Diagnoses   Name Primary?    Suspected COVID-19 virus infection Yes    Sore throat        PLAN: Meds and Comfort care as described in Patient Instructions    Meds & Refills for this Visit:  Requested Prescriptions      No prescriptions requested or ordered in this encounter     Risks, benefits, and side effects of medication explained and discussed.    The patient indicates understanding of these issues and agrees to the plan.  The patient is asked to f/u with PCP if sx's persist or worsen.  There are no Patient Instructions on file for this visit.

## 2025-02-11 RX ORDER — VIBEGRON 75 MG/1
1 TABLET, FILM COATED ORAL DAILY
Qty: 90 TABLET | Refills: 3 | Status: SHIPPED | OUTPATIENT
Start: 2025-02-11 | End: 2025-03-13

## 2025-02-11 NOTE — TELEPHONE ENCOUNTER
Faxed refill request received fro [facility], prescription sent to local pharmacy last month. Pt called to confirm she wants Rx sent to mail order instead which she confirmed.

## 2025-02-19 NOTE — TELEPHONE ENCOUNTER
Patient called for an update [facility]   MD INDERJIT NELSON MD SAKIL BHUIYAN, MD BRIANA PETITO, NP    TEL:  FROM 9 AM to 5 PM ---OFFICE: [phone]  FROM 5 PM - 9 AM PLEASE CALL ANSWERING SERVICE: [phone]     RENAL PROGRESS NOTE: DATE OF SERVICE 02-19-25 @ 16:17  Patient is a 86y old  Male who presents with a chief complaint of Referred by Vascular for Weakness Diarrhea  Patient seen and examined at bedside. No chest pain/sob    VITALS:  T(F): 100.4 (02-19-25 @ 12:58), Max: 100.4 (02-19-25 @ 12:58)  HR: 69 (02-19-25 @ 11:59)  BP: 168/71 (02-19-25 @ 11:59)  RR: 17 (02-19-25 @ 11:59)  SpO2: 92% (02-19-25 @ 11:59)  Wt(kg): --    02-18 @ 07:01  -  02-19 @ 07:00  --------------------------------------------------------  IN: 775 mL / OUT: 1500 mL / NET: -725 mL    02-19 @ 07:01  -  02-19 @ 16:17  --------------------------------------------------------  IN: 0 mL / OUT: 400 mL / NET: -400 mL      PHYSICAL EXAM:  Constitutional: NAD  Neck: No JVD  Respiratory: CTAB, no wheezes, rales or rhonchi  Cardiovascular: S1, S2, RRR  Gastrointestinal: BS+, soft, NT/ND  Extremities: No peripheral edema    Hospital Medications:   MEDICATIONS  (STANDING):  apixaban 10 milliGRAM(s) Oral every 12 hours  aspirin  chewable 81 milliGRAM(s) Oral daily  atorvastatin 20 milliGRAM(s) Oral at bedtime  ferrous    sulfate 325 milliGRAM(s) Oral daily  metoprolol succinate ER 25 milliGRAM(s) Oral daily  mirtazapine 7.5 milliGRAM(s) Oral daily  NIFEdipine XL 30 milliGRAM(s) Oral daily  piperacillin/tazobactam IVPB.- 3.375 Gram(s) IV Intermittent once  sodium chloride 0.9%. 1000 milliLiter(s) (75 mL/Hr) IV Continuous <Continuous>  tamsulosin 0.4 milliGRAM(s) Oral at bedtime      LABS:  02-19    140  |  106  |  37[H]  ----------------------------<  105[H]  4.6   |  24  |  1.61[H]    Ca    8.2[L]      19 Feb 2025 02:25  Phos  3.3     02-19  Mg     2.3     02-19      Creatinine Trend: 1.61 <--, 1.35 <--, 1.29 <--, 1.17 <--, 1.63 <--, 1.36 <--, 1.30 <--    Phosphorus: 3.3 mg/dL (02-19 @ 02:25)                              9.1    7.86  )-----------( 286      ( 19 Feb 2025 07:28 )             29.7     Urine Studies:  Urinalysis - [02-19-25 @ 02:25]      Color  / Appearance  / SG  / pH       Gluc 105 / Ketone   / Bili  / Urobili        Blood  / Protein  / Leuk Est  / Nitrite       RBC  / WBC  / Hyaline  / Gran  / Sq Epi  / Non Sq Epi  / Bacteria     Urine Creatinine 59      [02-15-25 @ 22:28]  Urine Sodium 151      [02-15-25 @ 22:28]    Iron 25, TIBC 249, %sat 10      [02-17-25 @ 07:23]  Ferritin 76      [02-17-25 @ 07:23]        RADIOLOGY & ADDITIONAL STUDIES:

## 2025-03-01 DIAGNOSIS — M81.0 OSTEOPOROSIS OF LUMBAR SPINE: ICD-10-CM

## 2025-03-04 ENCOUNTER — LAB ENCOUNTER (OUTPATIENT)
Dept: LAB | Age: 75
End: 2025-03-04
Attending: FAMILY MEDICINE
Payer: MEDICARE

## 2025-03-04 DIAGNOSIS — E78.00 PURE HYPERCHOLESTEROLEMIA: ICD-10-CM

## 2025-03-04 LAB
ALBUMIN SERPL-MCNC: 4.7 G/DL (ref 3.2–4.8)
ALBUMIN/GLOB SERPL: 1.7 {RATIO} (ref 1–2)
ALP LIVER SERPL-CCNC: 56 U/L
ALT SERPL-CCNC: 17 U/L
ANION GAP SERPL CALC-SCNC: 7 MMOL/L (ref 0–18)
AST SERPL-CCNC: 21 U/L (ref ?–34)
BILIRUB SERPL-MCNC: 0.5 MG/DL (ref 0.2–1.1)
BUN BLD-MCNC: 16 MG/DL (ref 9–23)
CALCIUM BLD-MCNC: 10.9 MG/DL (ref 8.7–10.6)
CHLORIDE SERPL-SCNC: 104 MMOL/L (ref 98–112)
CHOLEST SERPL-MCNC: 219 MG/DL (ref ?–200)
CO2 SERPL-SCNC: 29 MMOL/L (ref 21–32)
CREAT BLD-MCNC: 0.89 MG/DL
EGFRCR SERPLBLD CKD-EPI 2021: 68 ML/MIN/1.73M2 (ref 60–?)
FASTING PATIENT LIPID ANSWER: YES
FASTING STATUS PATIENT QL REPORTED: YES
GLOBULIN PLAS-MCNC: 2.7 G/DL (ref 2–3.5)
GLUCOSE BLD-MCNC: 93 MG/DL (ref 70–99)
HDLC SERPL-MCNC: 99 MG/DL (ref 40–59)
LDLC SERPL CALC-MCNC: 102 MG/DL (ref ?–100)
NONHDLC SERPL-MCNC: 120 MG/DL (ref ?–130)
OSMOLALITY SERPL CALC.SUM OF ELEC: 291 MOSM/KG (ref 275–295)
POTASSIUM SERPL-SCNC: 4.2 MMOL/L (ref 3.5–5.1)
PROT SERPL-MCNC: 7.4 G/DL (ref 5.7–8.2)
SODIUM SERPL-SCNC: 140 MMOL/L (ref 136–145)
TRIGL SERPL-MCNC: 103 MG/DL (ref 30–149)
VLDLC SERPL CALC-MCNC: 17 MG/DL (ref 0–30)

## 2025-03-04 PROCEDURE — 80061 LIPID PANEL: CPT

## 2025-03-04 PROCEDURE — 36415 COLL VENOUS BLD VENIPUNCTURE: CPT

## 2025-03-04 PROCEDURE — 80053 COMPREHEN METABOLIC PANEL: CPT

## 2025-03-07 RX ORDER — IBANDRONATE SODIUM 150 MG/1
150 TABLET, FILM COATED ORAL
Qty: 3 TABLET | Refills: 0 | Status: SHIPPED | OUTPATIENT
Start: 2025-03-07

## 2025-03-10 ENCOUNTER — MED REC SCAN ONLY (OUTPATIENT)
Dept: FAMILY MEDICINE CLINIC | Facility: CLINIC | Age: 75
End: 2025-03-10

## 2025-04-17 ENCOUNTER — OFFICE VISIT (OUTPATIENT)
Dept: FAMILY MEDICINE CLINIC | Facility: CLINIC | Age: 75
End: 2025-04-17
Payer: COMMERCIAL

## 2025-04-17 ENCOUNTER — LAB ENCOUNTER (OUTPATIENT)
Dept: LAB | Age: 75
End: 2025-04-17
Attending: FAMILY MEDICINE
Payer: MEDICARE

## 2025-04-17 VITALS
RESPIRATION RATE: 16 BRPM | BODY MASS INDEX: 27.18 KG/M2 | SYSTOLIC BLOOD PRESSURE: 120 MMHG | HEIGHT: 66 IN | DIASTOLIC BLOOD PRESSURE: 80 MMHG | WEIGHT: 169.13 LBS | OXYGEN SATURATION: 97 % | HEART RATE: 78 BPM | TEMPERATURE: 97 F

## 2025-04-17 DIAGNOSIS — E01.0 THYROMEGALY: ICD-10-CM

## 2025-04-17 DIAGNOSIS — Z00.00 ENCOUNTER FOR ANNUAL HEALTH EXAMINATION: ICD-10-CM

## 2025-04-17 DIAGNOSIS — Z00.00 ENCOUNTER FOR ANNUAL HEALTH EXAMINATION: Primary | ICD-10-CM

## 2025-04-17 DIAGNOSIS — E78.00 PURE HYPERCHOLESTEROLEMIA: ICD-10-CM

## 2025-04-17 DIAGNOSIS — M19.042 PRIMARY OSTEOARTHRITIS OF BOTH HANDS: ICD-10-CM

## 2025-04-17 DIAGNOSIS — Z12.31 VISIT FOR SCREENING MAMMOGRAM: ICD-10-CM

## 2025-04-17 DIAGNOSIS — E55.9 VITAMIN D DEFICIENCY: ICD-10-CM

## 2025-04-17 DIAGNOSIS — M17.0 PRIMARY OSTEOARTHRITIS OF BOTH KNEES: ICD-10-CM

## 2025-04-17 DIAGNOSIS — M81.0 OSTEOPOROSIS OF LUMBAR SPINE: ICD-10-CM

## 2025-04-17 DIAGNOSIS — K21.9 GASTROESOPHAGEAL REFLUX DISEASE WITHOUT ESOPHAGITIS: ICD-10-CM

## 2025-04-17 DIAGNOSIS — M19.041 PRIMARY OSTEOARTHRITIS OF BOTH HANDS: ICD-10-CM

## 2025-04-17 DIAGNOSIS — K44.9 HIATAL HERNIA: ICD-10-CM

## 2025-04-17 DIAGNOSIS — N39.46 MIXED INCONTINENCE: ICD-10-CM

## 2025-04-17 DIAGNOSIS — E83.52 HYPERCALCEMIA: ICD-10-CM

## 2025-04-17 LAB
ALBUMIN SERPL-MCNC: 4.7 G/DL (ref 3.2–4.8)
ALBUMIN/GLOB SERPL: 1.7 {RATIO} (ref 1–2)
ALP LIVER SERPL-CCNC: 52 U/L (ref 55–142)
ALT SERPL-CCNC: 22 U/L (ref 10–49)
ANION GAP SERPL CALC-SCNC: 10 MMOL/L (ref 0–18)
AST SERPL-CCNC: 30 U/L (ref ?–34)
BASOPHILS # BLD AUTO: 0.04 X10(3) UL (ref 0–0.2)
BASOPHILS NFR BLD AUTO: 1 %
BILIRUB SERPL-MCNC: 0.4 MG/DL (ref 0.2–1.1)
BUN BLD-MCNC: 11 MG/DL (ref 9–23)
CALCIUM BLD-MCNC: 10 MG/DL (ref 8.7–10.6)
CHLORIDE SERPL-SCNC: 107 MMOL/L (ref 98–112)
CO2 SERPL-SCNC: 26 MMOL/L (ref 21–32)
CREAT BLD-MCNC: 0.97 MG/DL (ref 0.55–1.02)
EGFRCR SERPLBLD CKD-EPI 2021: 61 ML/MIN/1.73M2 (ref 60–?)
EOSINOPHIL # BLD AUTO: 0.17 X10(3) UL (ref 0–0.7)
EOSINOPHIL NFR BLD AUTO: 4 %
ERYTHROCYTE [DISTWIDTH] IN BLOOD BY AUTOMATED COUNT: 15.3 %
EST. AVERAGE GLUCOSE BLD GHB EST-MCNC: 105 MG/DL (ref 68–126)
FASTING STATUS PATIENT QL REPORTED: NO
GLOBULIN PLAS-MCNC: 2.8 G/DL (ref 2–3.5)
GLUCOSE BLD-MCNC: 89 MG/DL (ref 70–99)
HBA1C MFR BLD: 5.3 % (ref ?–5.7)
HCT VFR BLD AUTO: 43 % (ref 35–48)
HGB BLD-MCNC: 13.5 G/DL (ref 12–16)
IMM GRANULOCYTES # BLD AUTO: 0.01 X10(3) UL (ref 0–1)
IMM GRANULOCYTES NFR BLD: 0.2 %
LYMPHOCYTES # BLD AUTO: 1.91 X10(3) UL (ref 1–4)
LYMPHOCYTES NFR BLD AUTO: 45.4 %
MCH RBC QN AUTO: 27.6 PG (ref 26–34)
MCHC RBC AUTO-ENTMCNC: 31.4 G/DL (ref 31–37)
MCV RBC AUTO: 87.9 FL (ref 80–100)
MONOCYTES # BLD AUTO: 0.43 X10(3) UL (ref 0.1–1)
MONOCYTES NFR BLD AUTO: 10.2 %
NEUTROPHILS # BLD AUTO: 1.65 X10 (3) UL (ref 1.5–7.7)
NEUTROPHILS # BLD AUTO: 1.65 X10(3) UL (ref 1.5–7.7)
NEUTROPHILS NFR BLD AUTO: 39.2 %
OSMOLALITY SERPL CALC.SUM OF ELEC: 295 MOSM/KG (ref 275–295)
PLATELET # BLD AUTO: 248 10(3)UL (ref 150–450)
POTASSIUM SERPL-SCNC: 4.7 MMOL/L (ref 3.5–5.1)
PROT SERPL-MCNC: 7.5 G/DL (ref 5.7–8.2)
RBC # BLD AUTO: 4.89 X10(6)UL (ref 3.8–5.3)
SODIUM SERPL-SCNC: 143 MMOL/L (ref 136–145)
TSI SER-ACNC: 0.65 UIU/ML (ref 0.55–4.78)
VIT D+METAB SERPL-MCNC: 40.2 NG/ML (ref 30–100)
WBC # BLD AUTO: 4.2 X10(3) UL (ref 4–11)

## 2025-04-17 PROCEDURE — 83036 HEMOGLOBIN GLYCOSYLATED A1C: CPT

## 2025-04-17 PROCEDURE — 80053 COMPREHEN METABOLIC PANEL: CPT

## 2025-04-17 PROCEDURE — 85025 COMPLETE CBC W/AUTO DIFF WBC: CPT

## 2025-04-17 PROCEDURE — 82306 VITAMIN D 25 HYDROXY: CPT

## 2025-04-17 PROCEDURE — 36415 COLL VENOUS BLD VENIPUNCTURE: CPT

## 2025-04-17 PROCEDURE — 84443 ASSAY THYROID STIM HORMONE: CPT

## 2025-04-17 RX ORDER — VIBEGRON 75 MG/1
TABLET, FILM COATED ORAL
COMMUNITY
Start: 2025-04-08

## 2025-04-17 NOTE — PROGRESS NOTES
The following individual(s) verbally consented to be recorded using ambient AI listening technology and understand that they can each withdraw their consent to this listening technology at any point by asking the clinician to turn off or pause the recording:    Patient name: Modesta Blue    Subjective:   Modesta Blue is a 74 year old female who presents for a MA AHA (Medicare Advantage Annual Health Assessment) and Subsequent Annual Wellness visit (Pt already had Initial Annual Wellness) and scheduled follow up of multiple significant but stable problems.   History of Present Illness  She is a 74-year-old female presenting for annual physical.     She has a history of hypercholesterolemia and discontinued her medication over a month ago due to muscle aches and pains. Her last cholesterol levels in March showed an LDL of 102 and total cholesterol of 219.    She was advised to stop calcium and vitamin D supplements due to elevated calcium levels noted in her labs from March. She is awaiting re-evaluation of her calcium levels.    She is currently taking Gemtesa for urinary incontinence, which is effective without side effects. She no longer needs to wear pads and feels the medication has improved her quality of life. Has been following with urogyne.     Her current medications include Alendronate once a month for osteoporosis. She has stopped taking calcium supplements.    She has arthritis, particularly affecting her hands, and uses a hand brace when writing. She experiences a 'trigger finger' that sometimes freezes up.      History/Other:   Fall Risk Assessment:   She has been screened for Falls and is low risk.      Cognitive Assessment:   She had a completely normal cognitive assessment - see flowsheet entries     Functional Ability/Status:   Modesta Blue has some abnormal functions as listed below:  She has Vision problems based on screening of functional status.       Depression Screening  (PHQ):  PHQ-2 SCORE: 0  , done 4/17/2025         Advanced Directives:   She does have a Living Will but we do NOT have it on file in Epic.    She does have a POA but we do NOT have it on file in Epic.    Discussed Advance Care Planning with patient (and family/surrogate if present). Standard forms made available to patient in After Visit Summary.      Patient Active Problem List   Diagnosis    Osteopenia    Pure hypercholesterolemia    Primary localized osteoarthrosis, hand    Lipoma of upper extremity    Spondylolisthesis of lumbar region    Thyromegaly    Arthritis of knee, right    Mixed incontinence    Gastroesophageal reflux disease without esophagitis    Hiatal hernia    Arthritis of right knee    Goiter    Age-related nuclear cataract of both eyes    Abnormal gait    Trochanteric bursitis of left hip     Allergies:  She is allergic to bee venom.    Current Medications:  Outpatient Medications Marked as Taking for the 4/17/25 encounter (Office Visit) with Ashish Helton, DO   Medication Sig    GEMTESA 75 MG Oral Tab     Ibandronate Sodium 150 MG Oral Tab Take 1 tablet (150 mg total) by mouth every 30 (thirty) days. Use as directed    Meloxicam 15 MG Oral Tab Take 1 tablet (15 mg total) by mouth as needed.    CRANBERRY OR Take by mouth As Directed.    Glucosamine-Chondroit-Vit C-Mn (GLUCOSAMINE CHONDR 500 COMPLEX OR) Take by mouth.    estradiol (ESTRACE) 0.1 MG/GM Vaginal Cream Apply 1/2  gram vaginally 2 times per week.    Esomeprazole Magnesium 20 MG Oral Capsule Delayed Release Take 1 capsule (20 mg total) by mouth in the morning and 1 capsule (20 mg total) before bedtime.    Multiple Vitamins-Minerals (CENTRUM VITAMINTS OR) Take by mouth.       Medical History:  She  has a past medical history of Abdominal distention, Abdominal pain, Allergic rhinitis, Arthritis, Atrial fibrillation (HCC), Back pain, Belching, Bloating, Change in hair, Constipation, Diarrhea, unspecified, Easy bruising, Esophageal  reflux, Family history of malignant neoplasm of urinary bladder, Flatulence/gas pain/belching, Frequent urination, Generalized osteoarthrosis, involving multiple sites, Heartburn, Hematuria, unspecified, Hemorrhoids, History of miscarriage, motion sickness, Hypercalcemia, Indigestion, Irregular bowel habits, Leaking of urine, Lipoma, Menopause, Need for prophylactic vaccination and inoculation against influenza, Night sweats,  (normal spontaneous vaginal delivery) ([facility]) (, ), Osteoarthritis, Osteopenia, Pain in joint, multiple sites, Pain in joints, Polydipsia, Problems with swallowing (2021), Pure hypercholesterolemia, Routine general medical examination at a health care facility, Routine gynecological examination, Screening for diabetes mellitus, Screening for lipoid disorders, Screening for malignant neoplasm of the rectum, Screening for other and unspecified deficiency anemia, Screening for thyroid disorder, Sleep disturbance, Special screening for malignant neoplasms, colon, Stool incontinence (loose stools), Urinary tract infection, site not specified, Varicella, Visual impairment, and Wears glasses.  Surgical History:  She  has a past surgical history that includes other (2021); colonoscopy; Colectomy (2021); colonoscopy (N/A, 04/15/2022); d & c; ; other surgical history (colon sectioning ); cataract; and glaucoma surgery.   Family History:  Her family history includes Alcohol and Other Disorders Associated in her father; Arthritis in her father, mother, and another family member; Breast Cancer (age of onset: 51) in her daughter; Cancer in her brother, daughter, father, sister, sister, and another family member; Colon Polyps in her father; Dementia in her mother and another family member; Ear Problems in her father; Eye Problems in her mother; Glaucoma in her mother; Heart Disease in an other family member; Heart Disorder in her brother; High Cholesterol in an other family  member; Hypertension in her sister; Neurological Disorder in her mother and another family member; Stroke in her mother and another family member; Thyroid Disorder in her sister; digestive disorder in her father; osteoporosis in her mother.  Social History:  She  reports that she has never smoked. She has never been exposed to tobacco smoke. She has never used smokeless tobacco. She reports current alcohol use of about 1.0 standard drink of alcohol per week. She reports that she does not use drugs.    Tobacco:  She has never smoked tobacco.    CAGE Alcohol Screen:   CAGE screening score of 0 on 4/17/2025, showing low risk of alcohol abuse.      Patient Care Team:  Ashish Helton DO as PCP - General (Family Medicine)  Isra Joshi MD as Obstetrician (OBSTETRICS & GYNECOLOGY)  Stephani Farah MD (GASTROENTEROLOGY)    Review of Systems  GENERAL: feels well otherwise  SKIN: denies any unusual skin lesions  EYES: denies blurred vision or double vision  HEENT: denies nasal congestion, sinus pain or ST  LUNGS: denies shortness of breath with exertion  CARDIOVASCULAR: denies chest pain on exertion  GI: denies abdominal pain, +heartburn  : per hpi  MUSCULOSKELETAL: per hpi  NEURO: denies headaches  PSYCHE: denies depression or anxiety  HEMATOLOGIC: denies hx of anemia  ENDOCRINE: denies thyroid history  ALL/ASTHMA: denies hx of allergy or asthma    Objective:   Physical Exam  General Appearance:  Alert, cooperative, no distress, appears stated age   Head:  Normocephalic, without obvious abnormality, atraumatic   Eyes:  PERRL, EOM's intact both eyes   Ears:  Normal TM's and external ear canals, both ears   Nose: Nares normal    Throat: MMM   Neck: Supple, symmetrical, trachea midline, no adenopathy    Back:   Symmetric, no curvature, ROM normal, no CVA tenderness   Lungs:   Clear to auscultation bilaterally, respirations unlabored   Heart:  Regular rate and rhythm, S1 and S2 normal    Abdomen:   Soft,  non-tender, bowel sounds active all four quadrants,  no masses, no organomegaly   Pelvic: Deferred   Extremities: Extremities normal, atraumatic, no cyanosis or edema   Pulses: 2+ and symmetric   Skin: Skin color, texture, turgor normal, no rashes or lesions   Lymph nodes: Cervical nodes normal   Neurologic: Normal       /80   Pulse 78   Temp 97.3 °F (36.3 °C) (Temporal)   Resp 16   Ht 5' 6\" (1.676 m)   Wt 169 lb 2 oz (76.7 kg)   SpO2 97%   BMI 27.30 kg/m²  Estimated body mass index is 27.3 kg/m² as calculated from the following:    Height as of this encounter: 5' 6\" (1.676 m).    Weight as of this encounter: 169 lb 2 oz (76.7 kg).    Medicare Hearing Assessment:   Hearing Screening    Screening Method: Whisper Test  Whisper Test Result: Pass         Visual Acuity:   Right Eye Visual Acuity: Uncorrected Right Eye Chart Acuity: 20/40   Left Eye Visual Acuity: Uncorrected Left Eye Chart Acuity: 20/25   Both Eyes Visual Acuity: Uncorrected Both Eyes Chart Acuity: 20/25   Able To Tolerate Visual Acuity: Yes        Assessment & Plan:   Modesta Blue is a 74 year old female who presents for a Medicare Assessment.     1. Encounter for annual health examination  - reviewed anticipatory guidance based on age  - check fasting labs  - CMP; Future  - Hemoglobin A1C; Future  - CBC; Future  - TSH W Reflex To Free T4; Future    2. Visit for screening mammogram  - [facility] IFRAH 2D+3D SCREENING BILAT (CPT=77067/77617); Future    3. Pure hypercholesterolemia  - discontinued statin due to myalgias  - cont low chol diet and regular exercise  - reviewed recent labs 1/2025,     4. Mixed incontinence  - following with urogyne  - cont gemtesa  - monitor symptoms    5. Gastroesophageal reflux disease without esophagitis  - cont GERD diet and lifestyle  - cont esomeprazole prn    6. Hiatal hernia  - stable    7. Osteoporosis of lumbar spine  - cont ibrandronate  - due for DEXA 2026  - wt bearing exercises as able    8.  Hypercalcemia  - discontinued calcium due to elevated levels  - will recheck Ca    9. Primary osteoarthritis of both knees  10. Primary osteoarthritis of both hands  - cont symptomatic tx  - continue hand exercises and use of hand brace.  - Consider steroid injection and ortho referral, if symptoms worsen.  - Use Tylenol Arthritis or meloxicam prn as needed.    11. Thyromegaly  - stable    12. Vitamin D deficiency  - Vitamin D [E]; Future    The patient indicates understanding of these issues and agrees to the plan.  Reinforced healthy diet, lifestyle, and exercise.      Return in 6 months (on 10/17/2025).      Supplementary Documentation:   General Health:  In the past six months, have you lost more than 10 pounds without trying?: 2 - No  Has your appetite been poor?: No  Type of Diet: Balanced  How does the patient maintain a good energy level?: Appropriate Exercise  How would you describe your daily physical activity?: Moderate  How would you describe your current health state?: Good  How do you maintain positive mental well-being?: Social Interaction  On a scale of 0 to 10, with 0 being no pain and 10 being severe pain, what is your pain level?: 5 - (Moderate)  In the past six months, have you experienced urine leakage?: 1-Yes  At any time do you feel concerned for the safety/well-being of yourself and/or your children, in your home or elsewhere?: No  Have you had any immunizations at another office such as Influenza, Hepatitis B, Tetanus, or Pneumococcal?: Yes    Health Maintenance   Topic Date Due    COVID-19 Vaccine (6 - 2024-25 season) 09/01/2024    Annual Well Visit  01/01/2025    Annual Depression Screening  01/01/2025    Influenza Vaccine (Season Ended) 10/01/2025    Colorectal Cancer Screening  04/15/2027    DEXA Scan  Completed    Fall Risk Screening (Annual)  Completed    Pneumococcal Vaccine: 50+ Years  Completed    Zoster Vaccines  Completed    Meningococcal B Vaccine  Aged Out    Mammogram   Discontinued

## 2025-04-18 PROBLEM — R39.15 URINARY URGENCY: Status: RESOLVED | Noted: 2020-06-29 | Resolved: 2025-04-18

## 2025-06-01 DIAGNOSIS — M81.0 OSTEOPOROSIS OF LUMBAR SPINE: ICD-10-CM

## 2025-06-03 RX ORDER — IBANDRONATE SODIUM 150 MG/1
150 TABLET, FILM COATED ORAL
Qty: 3 TABLET | Refills: 0 | Status: SHIPPED | OUTPATIENT
Start: 2025-06-03

## 2025-08-04 ENCOUNTER — TELEPHONE (OUTPATIENT)
Dept: FAMILY MEDICINE CLINIC | Facility: CLINIC | Age: 75
End: 2025-08-04

## 2025-08-04 DIAGNOSIS — E78.00 ELEVATED CHOLESTEROL: Primary | ICD-10-CM

## (undated) DEVICE — BULB SYRINGE,IRRIGATION WITH PROTECTIVE CAP: Brand: DOVER

## (undated) DEVICE — VIOLET BRAIDED (POLYGLACTIN 910), SYNTHETIC ABSORBABLE SUTURE: Brand: COATED VICRYL

## (undated) DEVICE — ENDOSCOPY PACK - LOWER: Brand: MEDLINE INDUSTRIES, INC.

## (undated) DEVICE — Device: Brand: DEFENDO AIR/WATER/SUCTION AND BIOPSY VALVE

## (undated) DEVICE — BLADE ELECTRODE: Brand: EDGE

## (undated) DEVICE — TRAP SPEC REMOVAL ETRAP 15CM

## (undated) DEVICE — TROCAR: Brand: KII SHIELDED BLADED ACCESS SYSTEM

## (undated) DEVICE — SUTURE ETHILON 3-0 PS-1

## (undated) DEVICE — CONTOUR CURVED CUTTER STAPLER RELOAD: Brand: CONTOUR

## (undated) DEVICE — SUTURE MONOCRYL 4-0 PS-2

## (undated) DEVICE — TROCAR: Brand: KII® SLEEVE

## (undated) DEVICE — SUTURE VICRYL 2-0

## (undated) DEVICE — FILTERLINE NASAL ADULT O2/CO2

## (undated) DEVICE — Device

## (undated) DEVICE — 1200CC GUARDIAN II: Brand: GUARDIAN

## (undated) DEVICE — SNARE 9MM 230CM 2.4MM EXACTO

## (undated) DEVICE — TUBING CYSTO

## (undated) DEVICE — SOL  .9 1000ML BTL

## (undated) DEVICE — 3M™ RED DOT™ MONITORING ELECTRODE WITH FOAM TAPE AND STICKY GEL, 50/BAG, 20/CASE, 72/PLT 2570: Brand: RED DOT™

## (undated) DEVICE — SUTURE PDS II 1 CTX

## (undated) DEVICE — MEDI-VAC TUBING CONNECTOR 6-IN-1 "Y" POLYPROPYLENE: Brand: CARDINAL HEALTH

## (undated) DEVICE — SPECIMEN SOCK - STANDARD: Brand: MEDI-VAC

## (undated) DEVICE — STERILE POLYISOPRENE POWDER-FREE SURGICAL GLOVES: Brand: PROTEXIS

## (undated) DEVICE — GOWN,SIRUS,FABRIC-REINFORCED,X-LARGE: Brand: MEDLINE

## (undated) DEVICE — TROCARS: Brand: KII® BALLOON BLUNT TIP SYSTEM

## (undated) DEVICE — DEV REMOVAL TRUCLEAR SFT MINI

## (undated) DEVICE — SUTURE PDS II 1 TP-1

## (undated) DEVICE — SUTURE PROLENE 2-0 SH

## (undated) DEVICE — CIRCULAR MECH XL SEAL 25MM

## (undated) DEVICE — CLOSING BUNDLE: Brand: MEDLINE INDUSTRIES, INC.

## (undated) DEVICE — 40580 - THE PINK PAD - ADVANCED TRENDELENBURG POSITIONING KIT: Brand: 40580 - THE PINK PAD - ADVANCED TRENDELENBURG POSITIONING KIT

## (undated) DEVICE — LAP COLON CDS: Brand: MEDLINE INDUSTRIES, INC.

## (undated) DEVICE — STERILE SYNTHETIC POLYISOPRENE POWDER-FREE SURGICAL GLOVES WITH HYDROGEL COATING: Brand: PROTEXIS

## (undated) DEVICE — SOLUTION  .9 1000ML BTL

## (undated) DEVICE — BAG DRAIN INFECTION CNTRL 2000

## (undated) DEVICE — DISPOSABLE, UNIPOLAR, BOVIE PLUG TO RIGHT ANGLE SINGLE PIN: Brand: QUANTUM

## (undated) DEVICE — DRAIN CHANNEL 19FR BLAKE

## (undated) DEVICE — SIGMOIDOSCOPE LIGHTED BIOSEAL

## (undated) DEVICE — FORCEP BIOPSY RJ4 LG CAP W/ND

## (undated) DEVICE — OUTFLOW HYSTER S&N

## (undated) DEVICE — MEDI-VAC NON-CONDUCTIVE SUCTION TUBING: Brand: CARDINAL HEALTH

## (undated) DEVICE — INFLOWHYSTER S&N

## (undated) DEVICE — SOL  .9 1000ML BAG

## (undated) DEVICE — CONTOUR CURVED CUTTER STAPLER: Brand: CONTOUR

## (undated) DEVICE — 2000CC GUARDIAN II: Brand: GUARDIAN

## (undated) DEVICE — SLEEVE KENDALL SCD EXPRESS MED

## (undated) DEVICE — LAPAROTOMY SPONGE - RF AND X-RAY DETECTABLE PRE-WASHED: Brand: SITUATE

## (undated) DEVICE — ENDOSCOPY PACK UPPER: Brand: MEDLINE INDUSTRIES, INC.

## (undated) DEVICE — SEAL TRUCLEAR  HYSTERSCOP

## (undated) DEVICE — DRAIN RESERVOIR RELIAVAC 100CC

## (undated) DEVICE — CHLORAPREP 26ML APPLICATOR

## (undated) DEVICE — SCD SLEEVE KNEE HI BLEND

## (undated) DEVICE — GYN CDS: Brand: MEDLINE INDUSTRIES, INC.

## (undated) DEVICE — DEV REMOVAL TRUCLEAR SFT PLUS

## (undated) DEVICE — SOLUTION  .9 3000ML

## (undated) DEVICE — SPONGE STICK WITH PVP-I: Brand: KENDALL

## (undated) NOTE — LETTER
02/15/21    Patient: Alex Pink  : 1950 Visit date: 2021    Dear  Anish Barrios MD    Thank you for referring Nadjarahul Joesph to my practice. Please find my assessment and plan below.       Alexandra Monroe is status post inpatient hosp

## (undated) NOTE — Clinical Note
TCM call completed. A TE was sent to triage for an appointment request. The patient reports pain has been managed well at home. The patient reported 1 blood clot passing through the SHALONDA drain over the weekend, but denies any further episodes.  The patient is

## (undated) NOTE — LETTER
Your patient was recently seen at the [facility] for a hospital follow-up visit. The visit note is attached. Please contact the clinic with any questions at [phone].     Thank you,  MARGARET Salazar

## (undated) NOTE — LETTER
Patient Name: Catie Nuñez  YOB: 1950          MRN number:  [id number]  Date:  6/14/2017  Referring Physician:  Willow Beckman         SPINE EVALUATION:    Referring Physician: Dr. Tariq Fan Diagnosis: Lumbosacral radiculopathy due to degenera hip pain and lower back pain.  Current findings today are consistent with the above stated diagnoses and are significant for restricted lumbar extension and rotation ROM, segmental hypomobility of lumbar and thoracic spine, flexibility deficits most pronoun FADDIR: more restrictions on R, greater trochanter pain provocation on R  WING: more restrictions on L, greater trochanter pain provocation on L  SLR: - (B)  Single leg bridge: L hip drop with R stance leg      Balance: SLS R 20 sec, less steady, drops op · Pt will be independent and compliant with comprehensive HEP to maintain progress achieved in PT (10 visits)      Frequency / Duration: Patient will be seen for 2 x/week or a total of 10 visits over a 90 day period.  Treatment will include: Manual Therapy;

## (undated) NOTE — LETTER
2/27/2020  Orlando Ken  [address] [facility] [place] [address]    We take each of our patient's health very seriously and the key to maintaining your health is an annual wellness physical.  Review of your medical records shows that it is time fo

## (undated) NOTE — Clinical Note
Pavel Sams,     I had the pleasure of seeing Emiliana Regan today was accompanied by her . If you recall she has a thickened endometrium without any postmenopausal bleeding.  She was very concerned about the finding, therefore, I suggested that we do

## (undated) NOTE — LETTER
Date: 2021      Patient Name: Jovanny Roblero      : 1950        Thank you for choosing [facility] as your health care provider. Your physician has deemed the following medical service(s) necessary.  However, your insura

## (undated) NOTE — LETTER
Date: 2022      Patient Name: Deloris Kolb      : 1950        Thank you for choosing [id number] as your health care provider. Your physician has deemed the following medical service(s) necessary. However, your insurance plan may not pay for all of your health care and costs and may deny payment for this service. The fact that your insurance plan does not pay for an item or service does not mean you should not receive it. The purpose of this form is to help you make an informed decision about whether or not you want to receive this service(s) that may not be paid for by your insurance plan. CPT Code Description     Cost     _G0101_____ _Pelvic Breast exam_____________  _$65.00_______      _Q0091___ _Pap smear____________________ _$48.23______      _________ ______________________________ _____________      I understand that the above mentioned service(s) or supply may not be covered by my insurance company.  I agree to be financially responsible for the cost of this service or supply in the event of my insurance denies payment as a non-covered benefit.        ______________________________________________________________________  Signature of Patient or Patient's Representative  Relationship  Date    ______________________________________________________________________  Signature of Witness to signing of form   Printed Name

## (undated) NOTE — LETTER
Modesta Blue, :1950    CONSENT FOR PROCEDURE/SEDATION    1. I authorize the performance upon Jj Amos  the following: Endometrial biopsy procedure    2.  I authorize Dr. Monie Nathan MD (and whomever is designated as the doctor’s as ____________________________________________    Witness: _________________________________________ Date:___________     Physician Signature: _______________________________ Date:___________